# Patient Record
Sex: MALE | Employment: OTHER | URBAN - METROPOLITAN AREA
[De-identification: names, ages, dates, MRNs, and addresses within clinical notes are randomized per-mention and may not be internally consistent; named-entity substitution may affect disease eponyms.]

---

## 2017-01-11 ENCOUNTER — OFFICE VISIT (OUTPATIENT)
Dept: FAMILY MEDICINE | Facility: CLINIC | Age: 57
End: 2017-01-11
Payer: COMMERCIAL

## 2017-01-11 VITALS
RESPIRATION RATE: 24 BRPM | SYSTOLIC BLOOD PRESSURE: 138 MMHG | WEIGHT: 263 LBS | HEART RATE: 82 BPM | BODY MASS INDEX: 38.29 KG/M2 | OXYGEN SATURATION: 97 % | DIASTOLIC BLOOD PRESSURE: 82 MMHG | TEMPERATURE: 95.5 F

## 2017-01-11 DIAGNOSIS — J20.9 ACUTE BRONCHITIS, UNSPECIFIED ORGANISM: Primary | ICD-10-CM

## 2017-01-11 PROCEDURE — 99213 OFFICE O/P EST LOW 20 MIN: CPT | Performed by: FAMILY MEDICINE

## 2017-01-11 RX ORDER — AZITHROMYCIN 250 MG/1
TABLET, FILM COATED ORAL
Qty: 6 TABLET | Refills: 0 | Status: SHIPPED | OUTPATIENT
Start: 2017-01-11 | End: 2017-03-14

## 2017-01-11 NOTE — PROGRESS NOTES
SUBJECTIVE:                                                    Jeevan Lopez is a 56 year old male who presents to clinic today for the following health issues:      Acute Illness   Acute illness concerns: Cough, congestion  Onset: 5 days ago     Fever: YES- possibly    Chills/Sweats: no    Headache (location?): YES    Sinus Pressure:YES    Conjunctivitis:  no    Ear Pain: no    Rhinorrhea: YES    Congestion: YES    Sore Throat: YES- a little bit here and there     Cough: YES-non-productive    Wheeze: YES    Decreased Appetite: no    Nausea: no    Vomiting: no    Diarrhea:  no    Dysuria/Freq.: no    Fatigue/Achiness: YES    Sick/Strep Exposure: no     Therapies Tried and outcome: None          Problem list and histories reviewed & adjusted, as indicated.  Additional history: as documented    SUBJECTIVE:  Jeevan  is a 56 year old male who presents for:  Cough and symptoms as noted above. Has been going on for a week.    Past Medical History   Diagnosis Date     Bell's palsy 1/17/2007     LVH (left ventricular hypertrophy) 6/7/2011     see stress echo     Deviated septum 9/17/2013     See CT scan     Adenomatous polyp of colon 9/2013     q 5 yr colonoscopy     VENTRAL HERNIA NEC 6/19/2007     Sensory polyneuropathy 2014     feet - see neuro consult     Chronic prostatitis 2015     per urology     NI (obstructive sleep apnea) 2016     moderate - dental appliance     Past Surgical History   Procedure Laterality Date     Hc repair umbilical gabriel,7+y/o, incarcerated or strangulated  6/9/2004     Colonoscopy  9/30/2013     Procedure: COMBINED COLONOSCOPY, SINGLE BIOPSY/POLYPECTOMY BY BIOPSY;  colonoscopy, polypectomy by biopsy;  Surgeon: Pedro Chris MD;  Location: [facility]     Excise mass head  11/18/2013     Procedure: EXCISE MASS HEAD;  Excision of left forehead mass;  Surgeon: Pelon Echavarria MD;  Location: [facility]     Phacoemulsification with standard intraocular lens implant Left 11/5/2015     Procedure:  PHACOEMULSIFICATION WITH STANDARD INTRAOCULAR LENS IMPLANT;  Surgeon: Joss Raza MD;  Location: [facility]     Laser yag capsulotomy Left 6/2/2016     Procedure: LASER YAG CAPSULOTOMY;  Surgeon: Joss Raza MD;  Location: [facility]     Social History   Substance Use Topics     Smoking status: Never Smoker      Smokeless tobacco: Never Used     Alcohol Use: 0.0 oz/week     0 Standard drinks or equivalent per week      Comment: 24 beers per week     Current Outpatient Prescriptions   Medication Sig Dispense Refill     azithromycin (ZITHROMAX) 250 MG tablet Two tablets first day, then one tablet daily for four days. 6 tablet 0     amLODIPine (NORVASC) 5 MG tablet Take 1 tablet (5 mg) by mouth daily 90 tablet 3     fluticasone (FLONASE) 50 MCG/ACT nasal spray INAHLE 2 SPRAYS INTO BOTH NOSTRILS DAILY 16 g 9     omeprazole (PRILOSEC) 20 MG capsule TAKE ONE CAPSULE BY MOUTH EVERY DAY 90 capsule 2     Cyanocobalamin (B-12 PO)        aspirin (ASPIRIN LOW DOSE) 81 MG tablet Take 1 tablet by mouth daily.       Glucosamine-Chondroitin (GLUCOSAMINE CHONDR COMPLEX PO) Take  by mouth.         REVIEW OF SYSTEMS:   5 point ROS negative except as noted above in HPI, including Gen., Resp, CV, GI &  system review.     OBJECTIVE:  Vitals: /82 mmHg  Pulse 82  Temp(Src) 95.5  F (35.3  C) (Temporal)  Resp 24  Wt 263 lb (119.296 kg)  SpO2 97%  BMI= Body mass index is 38.29 kg/(m^2).  He appears in no distress. His throat is a little bit irritated. Ears are clear. Neck supple no adenopathy. Lungs with some wheezes bilaterally. Heart with a regular rhythm. Skin clear.    ASSESSMENT:  Bronchitis    PLAN:  Z-Christian as well for him in the past medical with this again. He will take over-the-counter cough medication. Follow up if not improving.        Byron Ross MD  [facility]

## 2017-01-11 NOTE — NURSING NOTE
"Chief Complaint   Patient presents with     Cough       Initial /82 mmHg  Pulse 82  Temp(Src) 95.5  F (35.3  C) (Temporal)  Resp 24  Wt 263 lb (119.296 kg)  SpO2 97% Estimated body mass index is 38.29 kg/(m^2) as calculated from the following:    Height as of 8/1/16: 5' 9.5\" (1.765 m).    Weight as of this encounter: 263 lb (119.296 kg).  BP completed using cuff size: large    "

## 2017-01-11 NOTE — MR AVS SNAPSHOT
"              After Visit Summary   2017    Jeevan Lopez    MRN: [id number]           Patient Information     Date Of Birth          1960        Visit Information        Provider Department      2017 8:00 AM Byron Ross MD [facility]        Today's Diagnoses     Acute bronchitis, unspecified organism    -  1        Follow-ups after your visit        Who to contact     If you have questions or need follow up information about today's clinic visit or your schedule please contact [facility] directly at [phone].  Normal or non-critical lab and imaging results will be communicated to you by [employer], letter or phone within 4 business days after the clinic has received the results. If you do not hear from us within 7 days, please contact the clinic through [employer] or phone. If you have a critical or abnormal lab result, we will notify you by phone as soon as possible.  Submit refill requests through [employer] or call your pharmacy and they will forward the refill request to us. Please allow 3 business days for your refill to be completed.          Additional Information About Your Visit        MyChart Information     [employer] lets you send messages to your doctor, view your test results, renew your prescriptions, schedule appointments and more. To sign up, go to [email or web address] . Click on \"Log in\" on the left side of the screen, which will take you to the Welcome page. Then click on \"Sign up Now\" on the right side of the page.     You will be asked to enter the access code listed below, as well as some personal information. Please follow the directions to create your username and password.     Your access code is: [email or web address]  Expires: 2017  8:26 AM     Your access code will  in 90 days. If you need help or a new code, please call your [facility] or [phone].        Care EveryWhere ID     This is your Care EveryWhere ID. This could be " used by other organizations to access your [place] medical records  [id number]        Your Vitals Were     Pulse Temperature Respirations Pulse Oximetry          82 95.5  F (35.3  C) (Temporal) 24 97%         Blood Pressure from Last 3 Encounters:   01/11/17 138/82   08/01/16 157/95   06/28/16 147/84    Weight from Last 3 Encounters:   01/11/17 263 lb (119.296 kg)   08/01/16 250 lb (113.399 kg)   06/28/16 250 lb (113.399 kg)              Today, you had the following     No orders found for display         Today's Medication Changes          These changes are accurate as of: 1/11/17  8:26 AM.  If you have any questions, ask your nurse or doctor.               Start taking these medicines.        Dose/Directions    azithromycin 250 MG tablet   Commonly known as:  ZITHROMAX   Used for:  Acute bronchitis, unspecified organism   Started by:  Byron Ross MD        Two tablets first day, then one tablet daily for four days.   Quantity:  6 tablet   Refills:  0            Where to get your medicines      These medications were sent to [facility] [address] [place] [facility] [address] [facility] [address]     Phone:  [phone]    - azithromycin 250 MG tablet             Primary Care Provider Office Phone # Fax #    Alicia Keating PA-C [phone]       [address]   [facility] [place] [address]        Thank you!     Thank you for choosing [facility]  for your care. Our goal is always to provide you with excellent care. Hearing back from our patients is one way we can continue to improve our services. Please take a few minutes to complete the written survey that you may receive in the mail after your visit with us. Thank you!             Your Updated Medication List - Protect others around you: Learn how to safely use, store and throw away your medicines at www.disposemymeds.org.          This list is accurate as of: 1/11/17  8:26  AM.  Always use your most recent med list.                   Brand Name Dispense Instructions for use    amLODIPine 5 MG tablet    NORVASC    90 tablet    Take 1 tablet (5 mg) by mouth daily       ASPIRIN LOW DOSE 81 MG tablet   Generic drug:  aspirin      Take 1 tablet by mouth daily.       azithromycin 250 MG tablet    ZITHROMAX    6 tablet    Two tablets first day, then one tablet daily for four days.       B-12 PO          fluticasone 50 MCG/ACT spray    FLONASE    16 g    INAHLE 2 SPRAYS INTO BOTH NOSTRILS DAILY       GLUCOSAMINE CHONDR COMPLEX PO      Take  by mouth.       omeprazole 20 MG CR capsule    priLOSEC    90 capsule    TAKE ONE CAPSULE BY MOUTH EVERY DAY

## 2017-02-13 ENCOUNTER — TELEPHONE (OUTPATIENT)
Dept: FAMILY MEDICINE | Facility: CLINIC | Age: 57
End: 2017-02-13

## 2017-02-13 DIAGNOSIS — K21.9 ESOPHAGEAL REFLUX: ICD-10-CM

## 2017-02-14 NOTE — TELEPHONE ENCOUNTER
Omeprazole       Last Written Prescription Date: 11/30/2015  Last Fill Quantity: 90,  # refills: 2   Last Office Visit with G, [facility] or [facility] prescribing provider: 1/11/2017

## 2017-02-16 NOTE — TELEPHONE ENCOUNTER
Rx refilled per RN protocol.  #90    Will forward to schedulers to schedule patient for OV.  Mayte Mas RN

## 2017-03-14 ENCOUNTER — OFFICE VISIT (OUTPATIENT)
Dept: SLEEP MEDICINE | Facility: CLINIC | Age: 57
End: 2017-03-14
Payer: COMMERCIAL

## 2017-03-14 VITALS — HEIGHT: 70 IN | WEIGHT: 255 LBS | BODY MASS INDEX: 36.51 KG/M2

## 2017-03-14 DIAGNOSIS — G47.33 OSA (OBSTRUCTIVE SLEEP APNEA): Primary | ICD-10-CM

## 2017-03-14 PROCEDURE — 99214 OFFICE O/P EST MOD 30 MIN: CPT | Performed by: OTOLARYNGOLOGY

## 2017-03-14 NOTE — NURSING NOTE
"Chief Complaint   Patient presents with     Sleep Problem     dental device is not working       Initial Ht 1.765 m (5' 9.5\")  Wt 115.7 kg (255 lb)  BMI 37.12 kg/m2 Estimated body mass index is 37.12 kg/(m^2) as calculated from the following:    Height as of this encounter: 1.765 m (5' 9.5\").    Weight as of this encounter: 115.7 kg (255 lb).  Medication Reconciliation: complete       "

## 2017-03-14 NOTE — PROGRESS NOTES
Sleep Study Follow-Up Visit:    Date on this visit: 3/14/2017    Jeevan Lopez comes in today for follow-up of his attempt to use dental appliance for NI.  The patient was found to have moderate NI with AHI 27.2 and hypoxemia 33min SAO2 <88%.  The patient tried dental appliance but had a lot of jaw discomfort even though clinically felt better in AM.  These findings were reviewed with patient.     Past medical/surgical history, family history, social history, medications and allergies were reviewed.      Problem List:  Patient Active Problem List    Diagnosis Date Noted     NI (obstructive sleep apnea)      Priority: Medium     moderate - dental appliance       Daytime somnolence 05/11/2016     Priority: Medium     Snoring 05/11/2016     Priority: Medium     Chronic prostatitis      Priority: Medium     per urology       Anxiety 07/21/2014     Priority: Medium     GERD (gastroesophageal reflux disease) 09/09/2013     Priority: Medium     Paresthesia of both feet 03/30/2015     Sensory polyneuropathy      feet - see neuro consult       Cold feet 05/29/2014     Numbness in feet 05/29/2014     Sleep disturbance 02/07/2014     ED (erectile dysfunction) 02/07/2014     Deviated septum 09/17/2013     See CT scan       Chronic rhinitis 09/09/2013     Adenomatous polyp of colon 09/01/2013     q 5 yr colonoscopy       Hyperlipidemia LDL goal <130 05/10/2011     Hypertension goal BP (blood pressure) < 140/90 12/20/2010     Abnormal glucose 01/13/2009     IMO update changed this record. Please review for accuracy       Obesity 06/19/2007     Problem list name updated by automated process. Provider to review      ORAL - Diaz 3, class 2 occlusion, good dentition, tonsils 1+  NOSE - mild deviation of the septum to the left moder enlarged inf turbinates    Impression/Plan:    The patient is interested in CPAP trial and will try AUTO CPAP at 5 to 15 cm range. We also discuss weight loss and healthy lifestyle changes at  length.  He will follow up with me in about 1 month(s).     Twenty-five minutes spent with patient, all of which were spent face-to-face counseling, consulting, coordinating plan of care.      Hector Dillon MD  Your BMI is Body mass index is 37.12 kg/(m^2).    What is BMI?  Body mass index (BMI) is one way to tell whether you are at a healthy weight, overweight, or obese. It measures your weight in relation to your height.  A BMI of 18.5 to 24.9 is in the healthy range. A person with a BMI of 25 to 29.9 is considered overweight, and someone with a BMI of 30 or greater is considered obese.  Another way to find out if you are at risk for health problems caused by overweight and obesity is to measure your waist. If you are a woman and your waist is more than 35 inches, or if you are a man and your waist is more than 40 inches, your risk of disease may be higher.  More than two-thirds of American adults are considered overweight or obese. Being overweight or obese increases the risk for further weight gain.  Excess weight may lead to heart disease and diabetes. Creating and following plans for healthy eating and physical activity may help you improve your health.    Methods for maintaining or losing weight.  Weight control is part of healthy lifestyle and includes exercise, emotional health, and healthy eating habits.  Careful eating habits lifelong is the mainstay of weight control.  Though there are significant health benefits from weight loss, long-term weight loss with diet alone may be very difficult to achieve- studies show long-term success with dietary management in less than 10% of people. Attaining a healthy weight may be especially difficult to achieve in those with severe obesity. In some cases, medications, devices and surgical management might be considered.    What can you do?  If you are overweight or obese and are interested in methods for weight loss, you should discuss this with your provider. In  addition, we recommend that you review healthy life styles and methods for weight loss available through the National Institutes of Health patient information sites:   http://win.niddk.nih.gov/publications/index.htm          CC: Alicia Keating

## 2017-03-14 NOTE — PATIENT INSTRUCTIONS

## 2017-03-14 NOTE — MR AVS SNAPSHOT
After Visit Summary   3/14/2017    Jeevan Lopez    MRN: [id number]           Patient Information     Date Of Birth          1960        Visit Information        Provider Department      3/14/2017 2:15 PM Hector Dillon MD [facility]        Today's Diagnoses     NI (obstructive sleep apnea)    -  1      Care Instructions      Your BMI is Body mass index is 37.12 kg/(m^2).  Weight management is a personal decision.  If you are interested in exploring weight loss strategies, the following discussion covers the approaches that may be successful. Body mass index (BMI) is one way to tell whether you are at a healthy weight, overweight, or obese. It measures your weight in relation to your height.  A BMI of 18.5 to 24.9 is in the healthy range. A person with a BMI of 25 to 29.9 is considered overweight, and someone with a BMI of 30 or greater is considered obese. More than two-thirds of American adults are considered overweight or obese.  Being overweight or obese increases the risk for further weight gain. Excess weight may lead to heart disease and diabetes.  Creating and following plans for healthy eating and physical activity may help you improve your health.  Weight control is part of healthy lifestyle and includes exercise, emotional health, and healthy eating habits. Careful eating habits lifelong are the mainstay of weight control. Though there are significant health benefits from weight loss, long-term weight loss with diet alone may be very difficult to achieve- studies show long-term success with dietary management in less than 10% of people. Attaining a healthy weight may be especially difficult to achieve in those with severe obesity. In some cases, medications, devices and surgical management might be considered.  What can you do?  If you are overweight or obese and are interested in methods for weight loss, you should discuss this with your provider.     Consider  reducing daily calorie intake by 500 calories.     Keep a food journal.     Avoiding skipping meals, consider cutting portions instead.    Diet combined with exercise helps maintain muscle while optimizing fat loss. Strength training is particularly important for building and maintaining muscle mass. Exercise helps reduce stress, increase energy, and improves fitness. Increasing exercise without diet control, however, may not burn enough calories to loose weight.       Start walking three days a week 10-20 minutes at a time    Work towards walking thirty minutes five days a week     Eventually, increase the speed of your walking for 1-2 minutes at time    In addition, we recommend that you review healthy lifestyles and methods for weight loss available through the National Institutes of Health patient information sites:  http://win.niddk.nih.gov/publications/index.htm    And look into health and wellness programs that may be available through your health insurance provider, employer, local community center, or [employer].    Weight management plan: Patient was referred to their PCP to discuss a diet and exercise plan.            Follow-ups after your visit        Who to contact     If you have questions or need follow up information about today's clinic visit or your schedule please contact [facility] directly at [phone].  Normal or non-critical lab and imaging results will be communicated to you by MyChart, letter or phone within 4 business days after the clinic has received the results. If you do not hear from us within 7 days, please contact the clinic through [employer] or phone. If you have a critical or abnormal lab result, we will notify you by phone as soon as possible.  Submit refill requests through [employer] or call your pharmacy and they will forward the refill request to us. Please allow 3 business days for your refill to be completed.          Additional Information About Your  "Visit        [employer] Information     [employer] lets you send messages to your doctor, view your test results, renew your prescriptions, schedule appointments and more. To sign up, go to [email or web address] . Click on \"Log in\" on the left side of the screen, which will take you to the Welcome page. Then click on \"Sign up Now\" on the right side of the page.     You will be asked to enter the access code listed below, as well as some personal information. Please follow the directions to create your username and password.     Your access code is: [email or web address]  Expires: 2017  9:26 AM     Your access code will  in 90 days. If you need help or a new code, please call your [place] clinic or [phone].        Care EveryWhere ID     This is your Care EveryWhere ID. This could be used by other organizations to access your [place] medical records  [id number]        Your Vitals Were     Height BMI (Body Mass Index)                1.765 m (5' 9.5\") 37.12 kg/m2           Blood Pressure from Last 3 Encounters:   17 138/82   16 (!) 157/95   16 147/84    Weight from Last 3 Encounters:   17 115.7 kg (255 lb)   17 119.3 kg (263 lb)   16 113.4 kg (250 lb)              We Performed the Following     Comprehensive DME          Today's Medication Changes          These changes are accurate as of: 3/14/17  2:56 PM.  If you have any questions, ask your nurse or doctor.               Stop taking these medicines if you haven't already. Please contact your care team if you have questions.     azithromycin 250 MG tablet   Commonly known as:  ZITHROMAX   Stopped by:  Hector Dillon MD                    Primary Care Provider Office Phone # Fax #    Alicia Keating PA-C [phone]       [place] [address] [facility] [address] [place] [address]        Thank you!     Thank you for choosing [facility]  for your care. Our goal is always to provide " you with excellent care. Hearing back from our patients is one way we can continue to improve our services. Please take a few minutes to complete the written survey that you may receive in the mail after your visit with us. Thank you!             Your Updated Medication List - Protect others around you: Learn how to safely use, store and throw away your medicines at www.disposemymeds.org.          This list is accurate as of: 3/14/17  2:56 PM.  Always use your most recent med list.                   Brand Name Dispense Instructions for use    amLODIPine 5 MG tablet    NORVASC    90 tablet    Take 1 tablet (5 mg) by mouth daily       ASPIRIN LOW DOSE 81 MG tablet   Generic drug:  aspirin      Take 1 tablet by mouth daily.       B-12 PO          fluticasone 50 MCG/ACT spray    FLONASE    16 g    INAHLE 2 SPRAYS INTO BOTH NOSTRILS DAILY       GLUCOSAMINE CHONDR COMPLEX PO      Take  by mouth.       omeprazole 20 MG CR capsule    priLOSEC    90 capsule    Take 1 capsule (20 mg) by mouth daily .  Appointment needed for additional refills.

## 2017-03-20 ENCOUNTER — DOCUMENTATION ONLY (OUTPATIENT)
Dept: SLEEP MEDICINE | Facility: CLINIC | Age: 57
End: 2017-03-20

## 2017-03-20 DIAGNOSIS — G47.33 OSA (OBSTRUCTIVE SLEEP APNEA): Primary | ICD-10-CM

## 2017-03-20 NOTE — PROGRESS NOTES
Patient was offered choice of vendor and chose [facility].  Patient Jeevan Lopez was set up at [place] on March 20, 2017. Patient received a Resmed AirSense 10 Auto. Pressures were set at 5-15 cm H2O.   Patient s ramp is 5 cm H2O for Off and FLEX/EPR is EPR.  Patient received a Resmed Mask name: Airfit F10  Full Face mask Size Large, heated tubing and heated humidifier.  Patient is enrolled in the STM Program and does need to meet compliance. Patient has a follow up on 04/25/17 with Petr Madera.    Jihan Dukes

## 2017-03-23 ENCOUNTER — DOCUMENTATION ONLY (OUTPATIENT)
Dept: SLEEP MEDICINE | Facility: CLINIC | Age: 57
End: 2017-03-23

## 2017-03-23 NOTE — PROGRESS NOTES
3 DAY STM VISIT    Patient contacted for 3 day STM visit  Message left for patient to return call    Current settings:  EPAP Min Auto CPAP: 5.0 (The minimum allowable pressure in cmH2O)       EPAP Max Auto CPAP: 15.0 (The maximum allowable pressure in cmH2O)       Assessment:  Patient has 2 days of use.   Action plan: Pt to have f/u 14 day STM visit.

## 2017-03-30 NOTE — PROGRESS NOTES
Patient returned call.    Subjective measures:  It seems to really help however he is feeling that he is not getting enough air when he first starts out.  He is also having an issue with mask seal.    Action plan:  Send  Order to provider to narrow pressure range to help with air hunger and mask fit.  Changed settings on device to soft response.

## 2017-04-04 ENCOUNTER — DOCUMENTATION ONLY (OUTPATIENT)
Dept: SLEEP MEDICINE | Facility: CLINIC | Age: 57
End: 2017-04-04

## 2017-04-04 DIAGNOSIS — G47.33 OSA (OBSTRUCTIVE SLEEP APNEA): Primary | ICD-10-CM

## 2017-04-04 NOTE — PROGRESS NOTES
14 DAY STM VISIT    Subjective measures:  Patient feels like he not getting enough pressure when he starts out. Patient would like to switch to a nasal mask and he's wakes up sweating from his current mask. Patient also not using his humidifier.     Assessment: Pt not meeting objective benchmarks for AHI Patient failing following subjective benchmarks: mask discomfort.   Action plan: Pt to have 30 day STM visit and order placed to provider for pressure change. Patient to call Jihan about mask exchange and will put in order for pressure change 7-12 cm H20.   Device settings:    EPAP Min Auto CPAP 5.0 (The minimum allowable pressure in cmH2O)    EPAP Max Auto CPAP 15.0 (The maximum allowable pressure in cmH2O)    Target EPAP (95% of Target) 14 day average (Resmed): 10.5cm H20      Objective measures: 14 day rolling measure     % compliance greater than four hours rolling average 14 days: 92.8 %     95% OF Leak in litres Rolling Average 14 Days: 39.23 lpm last data upload        AHI Rolling Average 14 Day: 6.29  last data upload        Time mask on face 14 day average: 401 min         Objective measure goal  Compliance   Goal >70%  Leak   Goal < 10%  AHI  Goal < 5  Usage  Goal >240

## 2017-04-04 NOTE — Clinical Note
[place] request pressure change 7-12 H20 to narrow pressures and AHI slightly elevated. Patient is 5-15 currently with a 14 day AHI of 6.2. Thanks

## 2017-04-06 ENCOUNTER — DOCUMENTATION ONLY (OUTPATIENT)
Dept: SLEEP MEDICINE | Facility: CLINIC | Age: 57
End: 2017-04-06

## 2017-04-06 NOTE — PROGRESS NOTES
Patient called and stated he switched to a nasal mask and is going to try it tonight. Patient wanted his EPR adjusted because he has difficulty exhaling against the pressure. EPR was adjusted from 2-3. Patient will call if he has any problems with his mask or machine.

## 2017-04-06 NOTE — PROGRESS NOTES
PATIENT CAME IN TO TRY DREAMWEAR BECAUSE HE FELT LIKE HE WAS ADJUSTING AND READJUSTING EVERY OTHER HOUR THROUGHOUT THE NIGHT WITH HIS FULL FACE. HE MENTIONED IT WAS MAKING HIM SWEAT AND WAS TOO TIGHT AT TIMES. HE DECIDED TO EXCHANGE HIS AIRFIT F10 LARGE FOR THE DREAMWEAR AND A CHIN STRAP. -AB

## 2017-04-19 NOTE — PROGRESS NOTES
30 DAY STM VISIT    Message left for patient to return call     Assessment: Pt meeting objective benchmarks.     Action plan: Waiting for patient to return call and Pt to have 6 month STM visit  Patient has a follow up visit with Petr Madera on 4/25/17.   Device settings:    EPAP Min Auto CPAP: 7.0 (The minimum allowable pressure in cmH2O)    EPAP Max Auto CPAP: 12.0 (The maximum allowable pressure in cmH2O)    Target IPAP (95% of Target) 14 day average (Resmed): 10.4cm H20    Objective measures: 14 day rolling measures      % compliance greater than four hours rolling average 14 days: 85.71%     95% OF Leak in litres Rolling Average 14 Days: 20.74 lpm  last  upload     AHI Rolling Average 14 Day: 2.69 last  upload     Time mask on face 14 day average: 392 min        Objective measure goal  Compliance   Goal >70%  Leak   Goal < 10%  AHI  Goal < 5  Usage  Goal >240

## 2017-04-20 ENCOUNTER — DOCUMENTATION ONLY (OUTPATIENT)
Dept: SLEEP MEDICINE | Facility: CLINIC | Age: 57
End: 2017-04-20

## 2017-06-12 DIAGNOSIS — J31.0 CHRONIC RHINITIS: ICD-10-CM

## 2017-06-12 DIAGNOSIS — I10 HYPERTENSION GOAL BP (BLOOD PRESSURE) < 140/90: ICD-10-CM

## 2017-06-13 NOTE — TELEPHONE ENCOUNTER
amLODIPine (NORVASC) 5 MG tablet      Last Written Prescription Date: 5/11/16  Last Fill Quantity: 90, # refills: 3    Last Office Visit with [facility], [facility] or [facility] prescribing provider:  1/11/17   Future Office Visit:        BP Readings from Last 3 Encounters:   01/11/17 138/82   08/01/16 (!) 157/95   06/28/16 147/84

## 2017-06-13 NOTE — TELEPHONE ENCOUNTER
fluticasone (FLONASE) 50 MCG/ACT nasal spray      Last Written Prescription Date: 5/4/16  Last Fill Quantity: 16,  # refills: 9   Last Office Visit with [facility], [facility] or [facility] prescribing provider: 1/11/17

## 2017-06-14 RX ORDER — FLUTICASONE PROPIONATE 50 MCG
SPRAY, SUSPENSION (ML) NASAL
Qty: 16 G | Refills: 9 | Status: SHIPPED | OUTPATIENT
Start: 2017-06-14 | End: 2017-07-19

## 2017-06-14 RX ORDER — AMLODIPINE BESYLATE 5 MG/1
5 TABLET ORAL DAILY
Qty: 30 TABLET | Refills: 0 | Status: SHIPPED | OUTPATIENT
Start: 2017-06-14 | End: 2017-07-19

## 2017-06-14 NOTE — TELEPHONE ENCOUNTER
Routing refill request to provider for review/approval because:  Labs out of range:  BP    T'd up 1 month    Will forward to schedulers to schedule patient for OV.  Mayte Mas RN

## 2017-06-21 ENCOUNTER — TELEPHONE (OUTPATIENT)
Dept: FAMILY MEDICINE | Facility: CLINIC | Age: 57
End: 2017-06-21

## 2017-06-21 DIAGNOSIS — I10 HYPERTENSION GOAL BP (BLOOD PRESSURE) < 140/90: ICD-10-CM

## 2017-06-21 RX ORDER — HYDROCHLOROTHIAZIDE 25 MG/1
TABLET ORAL
Qty: 90 TABLET | Refills: 0 | Status: SHIPPED | OUTPATIENT
Start: 2017-06-21 | End: 2017-07-19

## 2017-06-21 NOTE — TELEPHONE ENCOUNTER
Patient returned call and wanted Alicia Keating to know that he has an appointment scheduled with Dr. Ross on Wednesday 6/28 for a medication check so he could get in within 30 days.     Thank you  Shamar Villagomez

## 2017-06-21 NOTE — TELEPHONE ENCOUNTER
Patient just called and was going to set up an appointment for a yearly pe but is going to call his previous clinic to make sure its been a year. Patient stated he would call us back to schedule

## 2017-06-21 NOTE — TELEPHONE ENCOUNTER
HCTZ     Not on med list pt stopped med  Last Written Prescription Date: 5/11/2016  Last Fill Quantity: 90, # refills: 3  Last Office Visit with [facility] or [facility] prescribing provider: 1/11/2017       Potassium   Date Value Ref Range Status   05/11/2016 4.3 3.4 - 5.3 mmol/L Final     Creatinine   Date Value Ref Range Status   05/11/2016 0.82 0.66 - 1.25 mg/dL Final     BP Readings from Last 3 Encounters:   01/11/17 138/82   08/01/16 (!) 157/95   06/28/16 147/84

## 2017-06-22 ENCOUNTER — TRANSFERRED RECORDS (OUTPATIENT)
Dept: HEALTH INFORMATION MANAGEMENT | Facility: CLINIC | Age: 57
End: 2017-06-22

## 2017-06-22 DIAGNOSIS — Z12.5 SCREENING FOR PROSTATE CANCER: Primary | ICD-10-CM

## 2017-06-26 DIAGNOSIS — Z12.5 SCREENING FOR PROSTATE CANCER: ICD-10-CM

## 2017-06-26 LAB — PSA SERPL-MCNC: 0.87 UG/L (ref 0–4)

## 2017-06-26 PROCEDURE — 36415 COLL VENOUS BLD VENIPUNCTURE: CPT | Performed by: UROLOGY

## 2017-06-26 PROCEDURE — 84153 ASSAY OF PSA TOTAL: CPT | Performed by: UROLOGY

## 2017-07-19 ENCOUNTER — OFFICE VISIT (OUTPATIENT)
Dept: FAMILY MEDICINE | Facility: CLINIC | Age: 57
End: 2017-07-19
Payer: COMMERCIAL

## 2017-07-19 ENCOUNTER — TELEPHONE (OUTPATIENT)
Dept: FAMILY MEDICINE | Facility: CLINIC | Age: 57
End: 2017-07-19

## 2017-07-19 VITALS
WEIGHT: 261 LBS | OXYGEN SATURATION: 97 % | SYSTOLIC BLOOD PRESSURE: 144 MMHG | HEART RATE: 77 BPM | TEMPERATURE: 96.3 F | BODY MASS INDEX: 37.99 KG/M2 | DIASTOLIC BLOOD PRESSURE: 90 MMHG

## 2017-07-19 DIAGNOSIS — K21.9 GASTROESOPHAGEAL REFLUX DISEASE, ESOPHAGITIS PRESENCE NOT SPECIFIED: ICD-10-CM

## 2017-07-19 DIAGNOSIS — D12.6 ADENOMATOUS POLYP OF COLON, UNSPECIFIED PART OF COLON: ICD-10-CM

## 2017-07-19 DIAGNOSIS — E66.01 MORBID OBESITY DUE TO EXCESS CALORIES (H): ICD-10-CM

## 2017-07-19 DIAGNOSIS — G62.9 NEUROPATHY: Primary | ICD-10-CM

## 2017-07-19 DIAGNOSIS — I10 HYPERTENSION GOAL BP (BLOOD PRESSURE) < 140/90: Primary | ICD-10-CM

## 2017-07-19 DIAGNOSIS — J31.0 CHRONIC RHINITIS, UNSPECIFIED TYPE: ICD-10-CM

## 2017-07-19 DIAGNOSIS — E78.5 HYPERLIPIDEMIA LDL GOAL <130: ICD-10-CM

## 2017-07-19 DIAGNOSIS — G62.1 ALCOHOLIC PERIPHERAL NEUROPATHY (H): ICD-10-CM

## 2017-07-19 LAB
ALBUMIN SERPL-MCNC: 3.4 G/DL (ref 3.4–5)
ALP SERPL-CCNC: 74 U/L (ref 40–150)
ALT SERPL W P-5'-P-CCNC: 18 U/L (ref 0–70)
ANION GAP SERPL CALCULATED.3IONS-SCNC: 8 MMOL/L (ref 3–14)
AST SERPL W P-5'-P-CCNC: 19 U/L (ref 0–45)
BILIRUB SERPL-MCNC: 0.7 MG/DL (ref 0.2–1.3)
BUN SERPL-MCNC: 16 MG/DL (ref 7–30)
CALCIUM SERPL-MCNC: 9.2 MG/DL (ref 8.5–10.1)
CHLORIDE SERPL-SCNC: 103 MMOL/L (ref 94–109)
CHOLEST SERPL-MCNC: 190 MG/DL
CO2 SERPL-SCNC: 30 MMOL/L (ref 20–32)
CREAT SERPL-MCNC: 0.97 MG/DL (ref 0.66–1.25)
FOLATE SERPL-MCNC: 24.2 NG/ML
GFR SERPL CREATININE-BSD FRML MDRD: 81 ML/MIN/1.7M2
GLUCOSE SERPL-MCNC: 107 MG/DL (ref 70–99)
HDLC SERPL-MCNC: 48 MG/DL
LDLC SERPL CALC-MCNC: 121 MG/DL
NONHDLC SERPL-MCNC: 142 MG/DL
POTASSIUM SERPL-SCNC: 3.9 MMOL/L (ref 3.4–5.3)
PROT SERPL-MCNC: 7 G/DL (ref 6.8–8.8)
SODIUM SERPL-SCNC: 141 MMOL/L (ref 133–144)
TRIGL SERPL-MCNC: 103 MG/DL
TSH SERPL DL<=0.05 MIU/L-ACNC: 1.53 MU/L (ref 0.4–4)
VIT B12 SERPL-MCNC: 1036 PG/ML (ref 193–986)

## 2017-07-19 PROCEDURE — 84443 ASSAY THYROID STIM HORMONE: CPT | Performed by: FAMILY MEDICINE

## 2017-07-19 PROCEDURE — 82607 VITAMIN B-12: CPT | Performed by: FAMILY MEDICINE

## 2017-07-19 PROCEDURE — 99214 OFFICE O/P EST MOD 30 MIN: CPT | Performed by: FAMILY MEDICINE

## 2017-07-19 PROCEDURE — 36415 COLL VENOUS BLD VENIPUNCTURE: CPT | Performed by: FAMILY MEDICINE

## 2017-07-19 PROCEDURE — 80053 COMPREHEN METABOLIC PANEL: CPT | Performed by: FAMILY MEDICINE

## 2017-07-19 PROCEDURE — 82746 ASSAY OF FOLIC ACID SERUM: CPT | Performed by: FAMILY MEDICINE

## 2017-07-19 PROCEDURE — 80061 LIPID PANEL: CPT | Performed by: FAMILY MEDICINE

## 2017-07-19 RX ORDER — FLUTICASONE PROPIONATE 50 MCG
SPRAY, SUSPENSION (ML) NASAL
Qty: 16 G | Refills: 10 | Status: SHIPPED | OUTPATIENT
Start: 2017-07-19 | End: 2019-01-02

## 2017-07-19 RX ORDER — HYDROCHLOROTHIAZIDE 25 MG/1
25 TABLET ORAL DAILY
Qty: 90 TABLET | Refills: 1 | Status: SHIPPED | OUTPATIENT
Start: 2017-07-19 | End: 2018-04-10

## 2017-07-19 RX ORDER — AMLODIPINE BESYLATE 5 MG/1
5 TABLET ORAL DAILY
Qty: 90 TABLET | Refills: 1 | Status: ON HOLD | OUTPATIENT
Start: 2017-07-19 | End: 2017-08-04

## 2017-07-19 ASSESSMENT — PAIN SCALES - GENERAL: PAINLEVEL: MILD PAIN (3)

## 2017-07-19 NOTE — TELEPHONE ENCOUNTER
----- Message from Alistair Jasmine Mai, MD sent at 7/19/2017  5:55 PM CDT -----  Please let patient know that his folic acid level was normal. Thank you

## 2017-07-19 NOTE — TELEPHONE ENCOUNTER
----- Message from Alistair Jasmine Mai, MD sent at 7/19/2017  4:04 PM CDT -----  Please let patient know  that his labs show his vitamin B12 level is high. That is most likely due to the B12 supplement that he is taking. Thyroid level was also normal. Electrolytes, kidney function tests and liver function test were normal. No diabetes. Cholesterol looks good.

## 2017-07-19 NOTE — MR AVS SNAPSHOT
After Visit Summary   7/19/2017    Jeevan Lopez    MRN: [id number]           Patient Information     Date Of Birth          1960        Visit Information        Provider Department      7/19/2017 8:40 AM Alistair Martinez MD [facility]        Today's Diagnoses     Hypertension goal BP (blood pressure) < 140/90    -  1    Gastroesophageal reflux disease, esophagitis presence not specified        Adenomatous polyp of colon, unspecified part of colon        Hyperlipidemia LDL goal <130        Chronic rhinitis, unspecified type        Alcoholic peripheral neuropathy (H)        Alcoholism /alcohol abuse (H)           Follow-ups after your visit        Additional Services     GASTROENTEROLOGY ADULT REF PROCEDURE ONLY       Last Lab Result: Creatinine (mg/dL)       Date                     Value                 05/11/2016               0.82             ----------  Body mass index is 37.99 kg/(m^2).     Needed:  No  Language:  English    Patient will be contacted to schedule procedure.     Please be aware that coverage of these services is subject to the terms and limitations of your health insurance plan.  Call member services at your health plan with any benefit or coverage questions.  Any procedures must be performed at a [place] facility OR coordinated by your clinic's referral office.    Please bring the following with you to your appointment:    (1) Any X-Rays, CTs or MRIs which have been performed.  Contact the facility where they were done to arrange for  prior to your scheduled appointment.    (2) List of current medications   (3) This referral request   (4) Any documents/labs given to you for this referral                  Follow-up notes from your care team     Return in about 6 months (around 1/19/2018).      Your next 10 appointments already scheduled     Aug 04, 2017  9:20 AM CDT   Pre-Op physical with Alistair Jasmine Mai, MD   [facility] ([place]  "[facility])    [address] [facility] [id number] [phone]            Aug 07, 2017   Procedure with Kiel Solomon MD   [facility] ([facility])    [address] [place] [id number] [phone]              Who to contact     If you have questions or need follow up information about today's clinic visit or your schedule please contact [facility] directly at [phone].  Normal or non-critical lab and imaging results will be communicated to you by MyChart, letter or phone within 4 business days after the clinic has received the results. If you do not hear from us within 7 days, please contact the clinic through [employer] or phone. If you have a critical or abnormal lab result, we will notify you by phone as soon as possible.  Submit refill requests through [employer] or call your pharmacy and they will forward the refill request to us. Please allow 3 business days for your refill to be completed.          Additional Information About Your Visit        [employer] Information     [employer] lets you send messages to your doctor, view your test results, renew your prescriptions, schedule appointments and more. To sign up, go to [email or web address] . Click on \"Log in\" on the left side of the screen, which will take you to the Welcome page. Then click on \"Sign up Now\" on the right side of the page.     You will be asked to enter the access code listed below, as well as some personal information. Please follow the directions to create your username and password.     Your access code is: [email or web address]  Expires: 2017 11:15 AM     Your access code will  in 90 days. If you need help or a new code, please call your [place] clinic or [phone].        Care EveryWhere ID     This is your Care EveryWhere ID. This could be used by other organizations to access your [facility] medical records  [id number]        Your Vitals Were     " Pulse Temperature Pulse Oximetry BMI (Body Mass Index)          77 96.3  F (35.7  C) (Temporal) 97% 37.99 kg/m2         Blood Pressure from Last 3 Encounters:   07/19/17 144/90   01/11/17 138/82   08/01/16 (!) 157/95    Weight from Last 3 Encounters:   07/19/17 261 lb (118.4 kg)   03/14/17 255 lb (115.7 kg)   01/11/17 263 lb (119.3 kg)              We Performed the Following     Comprehensive metabolic panel     Folate     GASTROENTEROLOGY ADULT REF PROCEDURE ONLY     Lipid panel reflex to direct LDL     TSH     Vitamin B12          Today's Medication Changes          These changes are accurate as of: 7/19/17 11:59 PM.  If you have any questions, ask your nurse or doctor.               These medicines have changed or have updated prescriptions.        Dose/Directions    amLODIPine 5 MG tablet   Commonly known as:  NORVASC   This may have changed:  additional instructions   Used for:  Hypertension goal BP (blood pressure) < 140/90   Changed by:  Alistair Martinez MD        Dose:  5 mg   Take 1 tablet (5 mg) by mouth daily   Quantity:  90 tablet   Refills:  1       fluticasone 50 MCG/ACT spray   Commonly known as:  FLONASE   This may have changed:  See the new instructions.   Used for:  Chronic rhinitis, unspecified type   Changed by:  Alistair Martinez MD        USE 2 SPRAYS IN EACH NOSTRIL ONCE DAILY   Quantity:  16 g   Refills:  10       hydrochlorothiazide 25 MG tablet   Commonly known as:  HYDRODIURIL   This may have changed:  See the new instructions.   Used for:  Hypertension goal BP (blood pressure) < 140/90   Changed by:  Alistair Martinez MD        Dose:  25 mg   Take 1 tablet (25 mg) by mouth daily   Quantity:  90 tablet   Refills:  1       omeprazole 20 MG CR capsule   Commonly known as:  priLOSEC   This may have changed:  additional instructions   Used for:  Gastroesophageal reflux disease, esophagitis presence not specified   Changed by:  Alistair Martinez MD        Dose:  20 mg   Take 1 capsule (20 mg) by mouth daily    Quantity:  90 capsule   Refills:  3            Where to get your medicines      These medications were sent to [facility] [place], [place] - [address]   [address] , [facility] [address]     Phone:  [phone]     amLODIPine 5 MG tablet    fluticasone 50 MCG/ACT spray    hydrochlorothiazide 25 MG tablet    omeprazole 20 MG CR capsule                Primary Care Provider Office Phone # Fax #    Alicia LUCINDA Keating PA-C [phone]       [place] [id number] [facility] [address]        Equal Access to Services     [facility]: Hadii aad ku hadasho Soomaali, waaxda luqadaha, qaybta kaalmada adeegyada, waxay miin haydeangelon sharon ashley . So [facility] [phone].    ATENCIÓN: Si habla español, tiene a antoine disposición servicios gratuitos de asistencia lingüística. Llame al [phone].    We comply with applicable federal civil rights laws and [place] laws. We do not discriminate on the basis of race, color, national origin, age, disability sex, sexual orientation or gender identity.            Thank you!     Thank you for choosing [facility]  for your care. Our goal is always to provide you with excellent care. Hearing back from our patients is one way we can continue to improve our services. Please take a few minutes to complete the written survey that you may receive in the mail after your visit with us. Thank you!             Your Updated Medication List - Protect others around you: Learn how to safely use, store and throw away your medicines at www.disposemymeds.org.          This list is accurate as of: 7/19/17 11:59 PM.  Always use your most recent med list.                   Brand Name Dispense Instructions for use Diagnosis    amLODIPine 5 MG tablet    NORVASC    90 tablet    Take 1 tablet (5 mg) by mouth daily    Hypertension goal BP (blood pressure) < 140/90       ASPIRIN LOW DOSE 81 MG tablet   Generic drug:  aspirin       Take 1 tablet by mouth daily.        B-12 PO           fluticasone 50 MCG/ACT spray    FLONASE    16 g    USE 2 SPRAYS IN EACH NOSTRIL ONCE DAILY    Chronic rhinitis, unspecified type       GLUCOSAMINE CHONDR COMPLEX PO      Take  by mouth.        hydrochlorothiazide 25 MG tablet    HYDRODIURIL    90 tablet    Take 1 tablet (25 mg) by mouth daily    Hypertension goal BP (blood pressure) < 140/90       omeprazole 20 MG CR capsule    priLOSEC    90 capsule    Take 1 capsule (20 mg) by mouth daily    Gastroesophageal reflux disease, esophagitis presence not specified       order for DME      Equipment ordered: RESMED Auto PAP Mask type: Full face  Settings: 5-15 cm H2O

## 2017-07-19 NOTE — PROGRESS NOTES
SUBJECTIVE:                                                    Jeevan Lopez is a 56 year old male who presents to clinic today for the following health issues:    Medication Followup of all meds    Taking Medication as prescribed: yes    Side Effects:  None    Medication Helping Symptoms:  yes     Jeevan is here today for general follow-up with medication refills.  First is to follow-up on his high blood pressure. Stated that he has been on the medication for it for years. He takes hydrochlorothiazide and Norvasc with no side effect. Does not check blood pressure at home. No headache or dizziness. Chest pain or shortness of breath. No leg swelling, orthopnea or dyspnea. Does not exercise or followed the low-salt diet recommendation.      He also has heartburn for which he takes Omeprazole daily as needed and it has been working well. Although ee tries to stay away from greasy, spicy food, he does not follow its recommendation strictly. Continue to drink about 6 packs of beer a day. Been drinking for years. He has been having the numbness and tingling sensation on his feet for years which is about the same. He had this for years as well. Extensive workup in the past and they were negative including EMG. No histories of diabetes.  Was told that he has idiopathic neuropathy. Again he drinks about 6 packs of beer a day for many years.    He also was wondering about colon cancer screening.  His last colonoscopy was in 2013 and it showed adenomatous polyps and was recommended repeat colonoscopy in 3-5 years. Denies of melena or hematochezia. No abdominal pain. No diarrhea constipation.      Also has the running nose and nasal congestion chronically. Has had it for a while.  Been sneezing as well.  Known to have allergic rhinitis.  No sinus pain or pressure.    He has no other concern today. No headache or dizziness. No nausea, vomiting, diarrhea or constipation. No problem with urination.       Problem list and histories  reviewed & adjusted, as indicated.  Additional history: as documented    Current Outpatient Prescriptions   Medication Sig Dispense Refill     hydrochlorothiazide (HYDRODIURIL) 25 MG tablet Take 1 tablet (25 mg) by mouth daily 90 tablet 1     amLODIPine (NORVASC) 5 MG tablet Take 1 tablet (5 mg) by mouth daily 90 tablet 1     omeprazole (PRILOSEC) 20 MG CR capsule Take 1 capsule (20 mg) by mouth daily 90 capsule 3     fluticasone (FLONASE) 50 MCG/ACT spray USE 2 SPRAYS IN EACH NOSTRIL ONCE DAILY 16 g 10     order for DME Equipment ordered: RESMED Auto PAP Mask type: Full face  Settings: 5-15 cm H2O       Cyanocobalamin (B-12 PO)        aspirin (ASPIRIN LOW DOSE) 81 MG tablet Take 1 tablet by mouth daily.       Glucosamine-Chondroitin (GLUCOSAMINE CHONDR COMPLEX PO) Take  by mouth.       Allergies   Allergen Reactions     Lisinopril Other (See Comments)     Dry hacky cough       Reviewed and updated as needed this visit by clinical staffTobacco  Allergies  Soc Hx      Reviewed and updated as needed this visit by Provider         ROS:  Constitutional, HEENT, cardiovascular, pulmonary, gi and gu systems are negative, except as otherwise noted.      OBJECTIVE:   /90  Pulse 77  Temp 96.3  F (35.7  C) (Temporal)  Wt 261 lb (118.4 kg)  SpO2 97%  BMI 37.99 kg/m2  Body mass index is 37.99 kg/(m^2).  GENERAL: healthy, alert and no distress  HENT: ear canals and TM's normal.  Nares are congested with clear drainage. Oropharynx is pink and moist. No tender with palpation to the sinuses.  NECK: no adenopathy, supple and thyroid normal to palpation  RESP: lungs clear to auscultation - no rales, rhonchi or wheezes  CV: regular rate and rhythm, no murmur.  ABDOMEN: soft, nontender, no palpable masses and bowel sounds normal  MS: no gross musculoskeletal defects noted, no edema. No focal weakness  SKIN: no suspicious lesions or rashes  NEURO: Normal strength and tone, mentation intact and speech normal.  Cranial nerve  II- XII intact. DTRs +2 throughout. No focal neurological deficit.  PSYCH: mentation appears normal, affect normal/bright    Diagnostic Test Results:  Results for orders placed or performed in visit on 07/19/17   Comprehensive metabolic panel   Result Value Ref Range    Sodium 141 133 - 144 mmol/L    Potassium 3.9 3.4 - 5.3 mmol/L    Chloride 103 94 - 109 mmol/L    Carbon Dioxide 30 20 - 32 mmol/L    Anion Gap 8 3 - 14 mmol/L    Glucose 107 (H) 70 - 99 mg/dL    Urea Nitrogen 16 7 - 30 mg/dL    Creatinine 0.97 0.66 - 1.25 mg/dL    GFR Estimate 81 >60 mL/min/1.7m2    GFR Estimate If Black >90   GFR Calc   >60 mL/min/1.7m2    Calcium 9.2 8.5 - 10.1 mg/dL    Bilirubin Total 0.7 0.2 - 1.3 mg/dL    Albumin 3.4 3.4 - 5.0 g/dL    Protein Total 7.0 6.8 - 8.8 g/dL    Alkaline Phosphatase 74 40 - 150 U/L    ALT 18 0 - 70 U/L    AST 19 0 - 45 U/L   Lipid panel reflex to direct LDL   Result Value Ref Range    Cholesterol 190 <200 mg/dL    Triglycerides 103 <150 mg/dL    HDL Cholesterol 48 >39 mg/dL    LDL Cholesterol Calculated 121 (H) <100 mg/dL    Non HDL Cholesterol 142 (H) <130 mg/dL   TSH   Result Value Ref Range    TSH 1.53 0.40 - 4.00 mU/L   Vitamin B12   Result Value Ref Range    Vitamin B12 1036 (H) 193 - 986 pg/mL   Folate   Result Value Ref Range    Folate 24.2 >5.4 ng/mL       ASSESSMENT/PLAN:     1. Hypertension goal BP (blood pressure) < 140/90  BP is controlled and stable. Continue with the current medications - HCTZ and Norvasc.  He has been tolerating them well.  Encourage low salt and healthy diet with daily excercise. Lab ordered today CMP.  Follow up in 6 month, earlier as needed.    - Comprehensive metabolic panel  - hydrochlorothiazide (HYDRODIURIL) 25 MG tablet; Take 1 tablet (25 mg) by mouth daily  Dispense: 90 tablet; Refill: 1  - amLODIPine (NORVASC) 5 MG tablet; Take 1 tablet (5 mg) by mouth daily  Dispense: 90 tablet; Refill: 1    2. Gastroesophageal reflux disease, esophagitis  presence not specified  Chronic and controlled.  Discussed with patient about nature of condition.  Emphasize on avoiding late meals as well as fatty, greasy and spicy food.  Continue Omeprazole as needed.     - omeprazole (PRILOSEC) 20 MG CR capsule; Take 1 capsule (20 mg) by mouth daily  Dispense: 90 capsule; Refill: 3    3. Adenomatous polyp of colon, unspecified part of colon  Refer for colonoscopy    - GASTROENTEROLOGY ADULT REF PROCEDURE ONLY    4. Hyperlipidemia LDL goal <130  No on medication.  No exercise or practicing healthy diet.  Check lipid panel today. Further management to be consider base on the lab result  - Lipid panel reflex to direct LDL    5. Chronic rhinitis, unspecified type  Discussed with him about the nature of the condition.  Will start him on Flonase.  He was taught how to use the nasal spray.  Side effect also discussed.  OTC Zyrtec as needed as well.    - fluticasone (FLONASE) 50 MCG/ACT spray; USE 2 SPRAYS IN EACH NOSTRIL ONCE DAILY  Dispense: 16 g; Refill: 10    6. Alcoholic peripheral neuropathy (H)  Discussed with him about the nature condition. Reassured him that his neuropathy is most likely due to alcoholism. EMG results reviewed. He has this for years and there is no significant change. Loday include CMP, folic acid and B12 level.  Will continue to monitor.    7. Alcoholism /alcohol abuse (H)    Discussed with him about the nature of the condition and explained to him about its long-term and short-term consequences. Discussed about rehab as well as counseling.  He is not interested to quit at this time.   Recommended to take over-the-counter vitamin, folic acid, B12 and thiamine daily. Encouraged him to let us know once he considered stop drinking.    8.  Morbid obesity  Discussed with patient about the nature of the condition and its long term and short term effects.  Encourage him to start daily aerobic exercise and healthy diet.  Decrease or stop alcohol intake.  Discussed  about portioned diet as well.  Recommend him to set a goal of losing 2-4 # a month and work toward that with healthy life style modification.  Discussed with patient the goal for his weight and his BMI.      Alistair Jasmine Mai, MD  [facility]

## 2017-07-19 NOTE — TELEPHONE ENCOUNTER
Patient called back and I gave him both lab results. He understood both results and had no other questions at this time.       Thank you,  Aga Booth   for [facility]

## 2017-07-19 NOTE — TELEPHONE ENCOUNTER
Left message for patient to call back there is two different lab results listed to relay.  Deborah Winslow MA 7/19/2017

## 2017-07-19 NOTE — NURSING NOTE
"Chief Complaint   Patient presents with     RECHECK     meds       Initial /72  Pulse 77  Temp 96.3  F (35.7  C) (Temporal)  Wt 261 lb (118.4 kg)  SpO2 97%  BMI 37.99 kg/m2 Estimated body mass index is 37.99 kg/(m^2) as calculated from the following:    Height as of 3/14/17: 5' 9.5\" (1.765 m).    Weight as of this encounter: 261 lb (118.4 kg).  Medication Reconciliation: complete       There are no preventive care reminders to display for this patient.    Kelsey Alonso, CMA      "

## 2017-07-20 ENCOUNTER — TELEPHONE (OUTPATIENT)
Dept: FAMILY MEDICINE | Facility: CLINIC | Age: 57
End: 2017-07-20

## 2017-07-26 NOTE — TELEPHONE ENCOUNTER
Pt called back and I went over his results, he is wondering if Dr. Martinez would do an A1C do to his neuropathy in his feet. He states that there is a family history of diabetes and he has always been boarder line on his glucose. I did inform him that Alicia will not order that without seeing him since she hasn't seen him in over a year. And he doesn't really want to have to come back in to be seen just wants to do a lab appt. I informed him that Michelle Stock is out of the clinic until Monday and he will get a call to let him know either way on the lab. I also mailed out a copy of his labs.  Mayte Gaona MA

## 2017-07-26 NOTE — TELEPHONE ENCOUNTER
Patient still has some questions on the results and would like to talk to either Dr. Martinez or Alicia Keating's team about these.  He would also like a copy mailed to him.  Please call.  [phone]

## 2017-07-29 PROBLEM — G62.1 ALCOHOLIC PERIPHERAL NEUROPATHY (H): Status: ACTIVE | Noted: 2017-07-29

## 2017-07-29 PROBLEM — E66.01 MORBID OBESITY (H): Status: ACTIVE | Noted: 2017-07-29

## 2017-07-31 NOTE — TELEPHONE ENCOUNTER
Patient was informed that he can schedule a lab only appointment. The lab is ordered.  [place]ar [place], LUCY

## 2017-08-02 DIAGNOSIS — G62.9 NEUROPATHY: ICD-10-CM

## 2017-08-02 LAB — HBA1C MFR BLD: 5 % (ref 4.3–6)

## 2017-08-02 PROCEDURE — 36415 COLL VENOUS BLD VENIPUNCTURE: CPT | Performed by: FAMILY MEDICINE

## 2017-08-02 PROCEDURE — 83036 HEMOGLOBIN GLYCOSYLATED A1C: CPT | Mod: QW | Performed by: FAMILY MEDICINE

## 2017-08-04 ENCOUNTER — OFFICE VISIT (OUTPATIENT)
Dept: FAMILY MEDICINE | Facility: CLINIC | Age: 57
End: 2017-08-04
Payer: COMMERCIAL

## 2017-08-04 VITALS
DIASTOLIC BLOOD PRESSURE: 86 MMHG | SYSTOLIC BLOOD PRESSURE: 138 MMHG | BODY MASS INDEX: 39.04 KG/M2 | RESPIRATION RATE: 20 BRPM | HEART RATE: 78 BPM | TEMPERATURE: 98.2 F | WEIGHT: 268.2 LBS | OXYGEN SATURATION: 98 %

## 2017-08-04 DIAGNOSIS — Z01.818 PREOP GENERAL PHYSICAL EXAM: Primary | ICD-10-CM

## 2017-08-04 DIAGNOSIS — I10 HYPERTENSION GOAL BP (BLOOD PRESSURE) < 140/90: ICD-10-CM

## 2017-08-04 DIAGNOSIS — D12.6 ADENOMATOUS POLYP OF COLON, UNSPECIFIED PART OF COLON: ICD-10-CM

## 2017-08-04 PROCEDURE — 99213 OFFICE O/P EST LOW 20 MIN: CPT | Performed by: FAMILY MEDICINE

## 2017-08-04 ASSESSMENT — PAIN SCALES - GENERAL: PAINLEVEL: NO PAIN (0)

## 2017-08-04 NOTE — PROGRESS NOTES
[address] [address]  [phone]  Dept: [phone]    PRE-OP EVALUATION:  Today's date: 2017    Jeevan Lopez (: 1960) presents for pre-operative evaluation assessment as requested by Dr. Solomon.  He requires evaluation and anesthesia risk assessment prior to undergoing surgery/procedure for treatment of colonoscopy .  Proposed procedure: Colonoscopy    Date of Surgery/ Procedure: 2017  Time of Surgery/ Procedure: 9:00am  Hospital/Surgical Facility: [facility]    Primary Physician: Alicia Keating  Type of Anesthesia Anticipated: to be determined    Patient has a Health Care Directive or Living Will:  NO    1. NO - Do you have a history of heart attack, stroke, stent, bypass or surgery on an artery in the head, neck, heart or legs?  2. NO - Do you ever have any pain or discomfort in your chest?  3. NO - Do you have a history of  Heart Failure?  4. NO - Are you troubled by shortness of breath when: walking on the level, up a slight hill or at night?  5. NO - Do you currently have a cold, bronchitis or other respiratory infection?  6. NO - Do you have a cough, shortness of breath or wheezing?  7. NO - Do you sometimes get pains in the calves of your legs when you walk?  8. NO - Do you or anyone in your family have previous history of blood clots?  9. NO - Do you or does anyone in your family have a serious bleeding problem such as prolonged bleeding following surgeries or cuts?  10. NO - Have you ever had problems with anemia or been told to take iron pills?  11. NO - Have you had any abnormal blood loss such as black, tarry or bloody stools, or abnormal vaginal bleeding?  12. NO - Have you ever had a blood transfusion?  13. YES - HAVE YOU OR ANY OF YOUR RELATIVES EVER HAD PROBLEMS WITH ANESTHESIA? Mother might have  14. YES - DO YOU HAVE SLEEP APNEA, EXCESSIVE SNORING OR DAYTIME DROWSINESS? Sleep apnea  15. NO - Do you have any prosthetic  heart valves?  16. NO - Do you have prosthetic joints?  17. NO - Is there any chance that you may be pregnant?        HPI:                                                      Brief HPI related to upcoming procedure:     Jeevan is here today for pre-op physical for colonoscopy. It expected be the same day procedure with general anesthesia. There is no personal or family history of anesthesia complication. There is no family or personal history of pre-matured CAD or MI. Generally is healthy, BP has been normal with medication.  Has not been on steroid orally in the last 6 months.  He does not take Aspirin or other form of blood thinner.  Does not take NSAIDS.   Stated that he was well informed about the procedure and is ready to have the procedure done.    He generally is doing well and has no concern today. No headache or dizziness. No URI symptoms include running nose, nasal congestion, ST, coughing, fever or chill.  No chest pain or SOB.  No N/V/D/C and denies of having problem with urination.  He never smokes but denies of having breathing problem. He drinks alcohol regularly - about 2-3 beers a daily on the weekday, 6-7 beers daily on the weekend.      MEDICAL HISTORY:                                                    Patient Active Problem List    Diagnosis Date Noted     Morbid obesity (H) 07/29/2017     Priority: Medium     Alcoholic peripheral neuropathy (H) 07/29/2017     Priority: Medium     Alcoholism /alcohol abuse (H) 07/29/2017     Priority: Medium     NI (obstructive sleep apnea)      Priority: Medium     moderate - dental appliance       Chronic prostatitis      Priority: Medium     per urology       Anxiety 07/21/2014     Priority: Medium     Sleep disturbance 02/07/2014     Priority: Medium     ED (erectile dysfunction) 02/07/2014     Priority: Medium     Deviated septum 09/17/2013     Priority: Medium     See CT scan       Chronic rhinitis 09/09/2013     Priority: Medium     GERD (gastroesophageal  reflux disease) 09/09/2013     Priority: Medium     Adenomatous polyp of colon 09/01/2013     Priority: Medium     q 5 yr colonoscopy       Hyperlipidemia LDL goal <130 05/10/2011     Priority: Medium     Hypertension goal BP (blood pressure) < 140/90 12/20/2010     Priority: Medium     Obesity 06/19/2007     Priority: Medium     Problem list name updated by automated process. Provider to review        Past Medical History:   Diagnosis Date     Adenomatous polyp of colon 9/2013    q 5 yr colonoscopy     Bell's palsy 1/17/2007     Chronic prostatitis 2015    per urology     Deviated septum 9/17/2013    See CT scan     LVH (left ventricular hypertrophy) 6/7/2011    see stress echo     NI (obstructive sleep apnea) 2016    moderate - dental appliance     Sensory polyneuropathy 2014    feet - see neuro consult     VENTRAL HERNIA NEC 6/19/2007     Past Surgical History:   Procedure Laterality Date     COLONOSCOPY  9/30/2013    Procedure: COMBINED COLONOSCOPY, SINGLE BIOPSY/POLYPECTOMY BY BIOPSY;  colonoscopy, polypectomy by biopsy;  Surgeon: Pedro Chris MD;  Location: [facility]     EXCISE MASS HEAD  11/18/2013    Procedure: EXCISE MASS HEAD;  Excision of left forehead mass;  Surgeon: Pelon Echavarria MD;  Location: [facility]     HC REPAIR UMBILICAL YVONNE,5+Y/O, INCARCERATED OR STRANGULATED  6/9/2004     LASER YAG CAPSULOTOMY Left 6/2/2016    Procedure: LASER YAG CAPSULOTOMY;  Surgeon: Joss Raza MD;  Location: [facility]     PHACOEMULSIFICATION WITH STANDARD INTRAOCULAR LENS IMPLANT Left 11/5/2015    Procedure: PHACOEMULSIFICATION WITH STANDARD INTRAOCULAR LENS IMPLANT;  Surgeon: Joss Raza MD;  Location: [facility]     Current Outpatient Prescriptions   Medication Sig Dispense Refill     hydrochlorothiazide (HYDRODIURIL) 25 MG tablet Take 1 tablet (25 mg) by mouth daily 90 tablet 1     amLODIPine (NORVASC) 5 MG tablet Take 1 tablet (5 mg) by mouth daily 90 tablet 1     omeprazole (PRILOSEC) 20 MG CR capsule  Take 1 capsule (20 mg) by mouth daily 90 capsule 3     fluticasone (FLONASE) 50 MCG/ACT spray USE 2 SPRAYS IN EACH NOSTRIL ONCE DAILY 16 g 10     order for DME Equipment ordered: RESMED Auto PAP Mask type: Full face  Settings: 5-15 cm H2O       Cyanocobalamin (B-12 PO)        aspirin (ASPIRIN LOW DOSE) 81 MG tablet Take 1 tablet by mouth daily.       Glucosamine-Chondroitin (GLUCOSAMINE CHONDR COMPLEX PO) Take  by mouth.       OTC products: None, except as noted above    Allergies   Allergen Reactions     Lisinopril Other (See Comments)     Dry hacky cough      Latex Allergy: NO    Social History   Substance Use Topics     Smoking status: Never Smoker     Smokeless tobacco: Never Used     Alcohol use 0.0 oz/week     0 Standard drinks or equivalent per week      Comment: 24 beers per week     History   Drug Use No       REVIEW OF SYSTEMS:                                                    Constitutional, HEENT, cardiovascular, pulmonary, gi and gu systems are negative, except as otherwise noted.      EXAM:                                                    /86 (BP Location: Left arm, Patient Position: Chair, Cuff Size: Adult Regular)  Pulse 78  Temp 98.2  F (36.8  C) (Temporal)  Resp 20  Wt 268 lb 3.2 oz (121.7 kg)  SpO2 98%  BMI 39.04 kg/m2      GENERAL APPEARANCE: healthy, alert and no distress     EYES: EOMI,- PERRL     HENT: ear canals and TM's normal and nose and mouth without ulcers or lesions. Nares are non-congested. Oropharynx is pink and moist. No tender with palpation to the sinuses.     NECK: no adenopathy, no asymmetry and thyroid normal to palpation.  No tender with palpation to the cervical spine and its para-spinous muscle bilaterally.     RESP: lungs clear to auscultation - no rales, rhonchi or wheezes     CV: regular rates and rhythm and no murmur.     ABDOMEN:  soft, nontender, no palpable masses and bowel sounds normal     MS: extremities normal- no gross deformities noted.  No edema.   All 4 extremities are equally in strength.     NEURO: Normal strength and tone,  mentation intact and speech normal     PSYCH: mentation appears normal. and affect normal/bright     LYMPHATICS: No cervical adenopathy.    DIAGNOSTICS:                                                    No labs or EKG required for low risk surgery (cataract, skin procedure, breast biopsy, etc)    Recent Labs   Lab Test  08/02/17   0840  07/19/17   0953  05/11/16   1053  03/30/15   0935  09/17/14   1026   HGB   --    --   15.7  16.5   --    PLT   --    --   230  241   --    NA   --   141  137  138   --    POTASSIUM   --   3.9  4.3  4.3   --    CR   --   0.97  0.82  0.90   --    A1C  5.0   --    --    --   5.1      IMPRESSION:                                                    Reason for surgery/procedure: colon cancer screening with hx of colonic polyps  Diagnosis/reason for consult: .pre-op physical to evaluate for anesthesia and its christoph-operative risks.    The proposed surgical procedure is considered INTERMEDIATE risk.    REVISED CARDIAC RISK INDEX  The patient has the following serious cardiovascular risks for perioperative complications such as (MI, PE, VFib and 3  AV Block):  No serious cardiac risks  INTERPRETATION: 1 risks: Class II (low risk - 0.9% complication rate)    The patient has the following additional risks for perioperative complications:  Alcohol abuse with risk of withdrawal      ICD-10-CM    1. Preop general physical exam Z01.818        RECOMMENDATIONS:                                                      APPROVAL GIVEN to proceed with proposed procedure, without further diagnostic evaluation    Jeevan is overall doing well.  He is clear for the procedure as scheduled.  No further work up is needed.  Instructed him to fast at least 8 hrs before the procedure time. Not take any blood thinner.   I recommend to stay away from ASA and NSAIDs until after the surgery. I went over his medication list with instructions on  when and which medication should be held or taken. A written instruction was given as well. Recommend appropriate DVT prophylactic during and after the surgery per hospital's protocol.  All of his questions were answered.       Signed Electronically by: Alistair Jasmine Mai, MD    Copy of this evaluation report is provided to requesting physician.    [place] Preop Guidelines

## 2017-08-04 NOTE — MR AVS SNAPSHOT
After Visit Summary   8/4/2017    Jeevan Lopez    MRN: [id number]           Patient Information     Date Of Birth          1960        Visit Information        Provider Department      8/4/2017 9:20 AM Alistair Martinez MD [facility]        Today's Diagnoses     Preop general physical exam    -  1    Hypertension goal BP (blood pressure) < 140/90          Care Instructions      Before Your Surgery      Call your surgeon if there is any change in your health. This includes signs of a cold or flu (such as a sore throat, runny nose, cough, rash or fever).    Do not smoke, drink alcohol or take over the counter medicine (unless your surgeon or primary care doctor tells you to) for the 24 hours before and after surgery.    If you take prescribed drugs: Follow your doctor s orders about which medicines to take and which to stop until after surgery.    Eating and drinking prior to surgery: follow the instructions from your surgeon    Take a shower or bath the night before surgery. Use the soap your surgeon gave you to gently clean your skin. If you do not have soap from your surgeon, use your regular soap. Do not shave or scrub the surgery site.  Wear clean pajamas and have clean sheets on your bed.   Before Your Surgery    Call your surgeon if there is any change in your health. This includes signs of a cold or flu (such as a sore throat, runny nose, cough, rash or fever).  Do not smoke, drink alcohol or take over the counter medicine (unless your surgeon or primary care doctor tells you to) for the 24 hours before and after surgery.  If you take prescribed drugs: Follow your doctor s orders about which medicines to take and which to stop until after surgery.  Eating and drinking prior to surgery: follow the instructions from your surgeon  Take a shower or bath the night before surgery. Use the soap your surgeon gave you to gently clean your skin. If you do not have soap from your surgeon,  "use your regular soap. Do not shave or scrub the surgery site.  Wear clean pajamas and have clean sheets on your bed.           Follow-ups after your visit        Follow-up notes from your care team     Return if symptoms worsen or fail to improve.      Your next 10 appointments already scheduled     Aug 07, 2017   Procedure with Kiel Solomon MD   [facility] ([facility])    [address] [place] [id number] [phone]              Who to contact     If you have questions or need follow up information about today's clinic visit or your schedule please contact [facility] directly at [phone].  Normal or non-critical lab and imaging results will be communicated to you by MyChart, letter or phone within 4 business days after the clinic has received the results. If you do not hear from us within 7 days, please contact the clinic through [facility] or phone. If you have a critical or abnormal lab result, we will notify you by phone as soon as possible.  Submit refill requests through [employer] or call your pharmacy and they will forward the refill request to us. Please allow 3 business days for your refill to be completed.          Additional Information About Your Visit        MyChart Information     [employer] lets you send messages to your doctor, view your test results, renew your prescriptions, schedule appointments and more. To sign up, go to [email or web address] . Click on \"Log in\" on the left side of the screen, which will take you to the Welcome page. Then click on \"Sign up Now\" on the right side of the page.     You will be asked to enter the access code listed below, as well as some personal information. Please follow the directions to create your username and password.     Your access code is: [email or web address]  Expires: 2017 11:15 AM     Your access code will  in 90 days. If you need help or a new code, please call your [facility] " [facility] or [phone].        Care EveryWhere ID     This is your Care EveryWhere ID. This could be used by other organizations to access your [place] medical records  [id number]        Your Vitals Were     Pulse Temperature Respirations Pulse Oximetry BMI (Body Mass Index)       78 98.2  F (36.8  C) (Temporal) 20 98% 39.04 kg/m2        Blood Pressure from Last 3 Encounters:   08/04/17 138/86   07/19/17 144/90   01/11/17 138/82    Weight from Last 3 Encounters:   08/04/17 268 lb 3.2 oz (121.7 kg)   07/19/17 261 lb (118.4 kg)   03/14/17 255 lb (115.7 kg)              Today, you had the following     No orders found for display       Primary Care Provider Office Phone # Fax #    Alicia Keating PA-C [phone]       [facility] [address] [place] [address]        Equal Access to Services     TERE MACKAY : Hadii aad ku hadasho Soomaali, waaxda luqadaha, qaybta kaalmada adeegyada, waxay idiin hayaan adeeg kharash la'deangelon . So [facility] [phone].    ATENCIÓN: Si habla español, tiene a antoine disposición servicios gratuitos de asistencia lingüística. Llame al [phone].    We comply with applicable federal civil rights laws and [place] laws. We do not discriminate on the basis of race, color, national origin, age, disability sex, sexual orientation or gender identity.            Thank you!     Thank you for choosing [facility]  for your care. Our goal is always to provide you with excellent care. Hearing back from our patients is one way we can continue to improve our services. Please take a few minutes to complete the written survey that you may receive in the mail after your visit with us. Thank you!             Your Updated Medication List - Protect others around you: Learn how to safely use, store and throw away your medicines at www.disposemymeds.org.          This list is accurate as of: 8/4/17 10:27 AM.  Always use your most recent med list.                    Brand Name Dispense Instructions for use Diagnosis    amLODIPine 5 MG tablet    NORVASC    90 tablet    Take 1 tablet (5 mg) by mouth daily    Hypertension goal BP (blood pressure) < 140/90       ASPIRIN LOW DOSE 81 MG tablet   Generic drug:  aspirin      Take 1 tablet by mouth daily.        B-12 PO           fluticasone 50 MCG/ACT spray    FLONASE    16 g    USE 2 SPRAYS IN EACH NOSTRIL ONCE DAILY    Chronic rhinitis, unspecified type       GLUCOSAMINE CHONDR COMPLEX PO      Take  by mouth.        hydrochlorothiazide 25 MG tablet    HYDRODIURIL    90 tablet    Take 1 tablet (25 mg) by mouth daily    Hypertension goal BP (blood pressure) < 140/90       omeprazole 20 MG CR capsule    priLOSEC    90 capsule    Take 1 capsule (20 mg) by mouth daily    Gastroesophageal reflux disease, esophagitis presence not specified       order for DME      Equipment ordered: RESMED Auto PAP Mask type: Full face  Settings: 5-15 cm H2O

## 2017-08-04 NOTE — NURSING NOTE
"Chief Complaint   Patient presents with     Pre-Op Exam       Initial /86 (BP Location: Left arm, Patient Position: Chair, Cuff Size: Adult Regular)  Pulse 78  Temp 98.2  F (36.8  C) (Temporal)  Resp 20  Wt 268 lb 3.2 oz (121.7 kg)  SpO2 98%  BMI 39.04 kg/m2 Estimated body mass index is 39.04 kg/(m^2) as calculated from the following:    Height as of 3/14/17: 5' 9.5\" (1.765 m).    Weight as of this encounter: 268 lb 3.2 oz (121.7 kg).  Medication Reconciliation: complete   Jihan Fernández CMA     "

## 2017-08-07 ENCOUNTER — ANESTHESIA EVENT (OUTPATIENT)
Dept: GASTROENTEROLOGY | Facility: CLINIC | Age: 57
End: 2017-08-07
Payer: COMMERCIAL

## 2017-08-07 ENCOUNTER — SURGERY (OUTPATIENT)
Age: 57
End: 2017-08-07

## 2017-08-07 ENCOUNTER — HOSPITAL ENCOUNTER (OUTPATIENT)
Facility: CLINIC | Age: 57
Discharge: HOME | End: 2017-08-07
Attending: FAMILY MEDICINE | Admitting: FAMILY MEDICINE
Payer: COMMERCIAL

## 2017-08-07 ENCOUNTER — ANESTHESIA (OUTPATIENT)
Dept: GASTROENTEROLOGY | Facility: CLINIC | Age: 57
End: 2017-08-07
Payer: COMMERCIAL

## 2017-08-07 VITALS
OXYGEN SATURATION: 98 % | DIASTOLIC BLOOD PRESSURE: 79 MMHG | SYSTOLIC BLOOD PRESSURE: 131 MMHG | TEMPERATURE: 98.4 F | WEIGHT: 268.2 LBS | BODY MASS INDEX: 39.04 KG/M2 | RESPIRATION RATE: 12 BRPM

## 2017-08-07 LAB — COLONOSCOPY: NORMAL

## 2017-08-07 PROCEDURE — 25000128 H RX IP 250 OP 636: Performed by: NURSE ANESTHETIST, CERTIFIED REGISTERED

## 2017-08-07 PROCEDURE — G0105 COLORECTAL SCRN; HI RISK IND: HCPCS | Performed by: FAMILY MEDICINE

## 2017-08-07 PROCEDURE — 25000125 ZZHC RX 250: Performed by: NURSE ANESTHETIST, CERTIFIED REGISTERED

## 2017-08-07 PROCEDURE — 45378 DIAGNOSTIC COLONOSCOPY: CPT | Performed by: FAMILY MEDICINE

## 2017-08-07 PROCEDURE — 40000296 ZZH STATISTIC ENDO RECOVERY CLASS 1:2 FIRST HOUR: Performed by: FAMILY MEDICINE

## 2017-08-07 PROCEDURE — 40000297 ZZH STATISTIC ENDO RECOVERY CLASS 1:2 EACH ADDL HOUR: Performed by: FAMILY MEDICINE

## 2017-08-07 RX ORDER — LIDOCAINE 40 MG/G
CREAM TOPICAL
Status: DISCONTINUED | OUTPATIENT
Start: 2017-08-07 | End: 2017-08-07 | Stop reason: HOSPADM

## 2017-08-07 RX ORDER — ONDANSETRON 2 MG/ML
4 INJECTION INTRAMUSCULAR; INTRAVENOUS EVERY 30 MIN PRN
Status: CANCELLED | OUTPATIENT
Start: 2017-08-07

## 2017-08-07 RX ORDER — HYDRALAZINE HYDROCHLORIDE 20 MG/ML
2.5-5 INJECTION INTRAMUSCULAR; INTRAVENOUS EVERY 10 MIN PRN
Status: CANCELLED | OUTPATIENT
Start: 2017-08-07

## 2017-08-07 RX ORDER — NALOXONE HYDROCHLORIDE 0.4 MG/ML
.1-.4 INJECTION, SOLUTION INTRAMUSCULAR; INTRAVENOUS; SUBCUTANEOUS
Status: CANCELLED | OUTPATIENT
Start: 2017-08-07 | End: 2017-08-08

## 2017-08-07 RX ORDER — AMLODIPINE BESYLATE 5 MG/1
5 TABLET ORAL
Qty: 90 TABLET | Refills: 1 | COMMUNITY
Start: 2017-08-04 | End: 2017-12-11

## 2017-08-07 RX ORDER — PROPOFOL 10 MG/ML
INJECTION, EMULSION INTRAVENOUS CONTINUOUS PRN
Status: DISCONTINUED | OUTPATIENT
Start: 2017-08-07 | End: 2017-08-07

## 2017-08-07 RX ORDER — ONDANSETRON 2 MG/ML
4 INJECTION INTRAMUSCULAR; INTRAVENOUS
Status: DISCONTINUED | OUTPATIENT
Start: 2017-08-07 | End: 2017-08-07 | Stop reason: HOSPADM

## 2017-08-07 RX ORDER — LABETALOL HYDROCHLORIDE 5 MG/ML
10 INJECTION, SOLUTION INTRAVENOUS
Status: CANCELLED | OUTPATIENT
Start: 2017-08-07

## 2017-08-07 RX ORDER — FENTANYL CITRATE 50 UG/ML
25-50 INJECTION, SOLUTION INTRAMUSCULAR; INTRAVENOUS
Status: CANCELLED | OUTPATIENT
Start: 2017-08-07

## 2017-08-07 RX ORDER — PROPOFOL 10 MG/ML
INJECTION, EMULSION INTRAVENOUS PRN
Status: DISCONTINUED | OUTPATIENT
Start: 2017-08-07 | End: 2017-08-07

## 2017-08-07 RX ORDER — MEPERIDINE HYDROCHLORIDE 25 MG/ML
12.5 INJECTION INTRAMUSCULAR; INTRAVENOUS; SUBCUTANEOUS
Status: CANCELLED | OUTPATIENT
Start: 2017-08-07

## 2017-08-07 RX ORDER — SODIUM CHLORIDE, SODIUM LACTATE, POTASSIUM CHLORIDE, CALCIUM CHLORIDE 600; 310; 30; 20 MG/100ML; MG/100ML; MG/100ML; MG/100ML
INJECTION, SOLUTION INTRAVENOUS CONTINUOUS
Status: CANCELLED | OUTPATIENT
Start: 2017-08-07

## 2017-08-07 RX ORDER — ONDANSETRON 4 MG/1
4 TABLET, ORALLY DISINTEGRATING ORAL EVERY 30 MIN PRN
Status: CANCELLED | OUTPATIENT
Start: 2017-08-07

## 2017-08-07 RX ORDER — SODIUM CHLORIDE, SODIUM LACTATE, POTASSIUM CHLORIDE, CALCIUM CHLORIDE 600; 310; 30; 20 MG/100ML; MG/100ML; MG/100ML; MG/100ML
INJECTION, SOLUTION INTRAVENOUS CONTINUOUS
Status: DISCONTINUED | OUTPATIENT
Start: 2017-08-07 | End: 2017-08-07 | Stop reason: HOSPADM

## 2017-08-07 RX ADMIN — PROPOFOL 30 MG: 10 INJECTION, EMULSION INTRAVENOUS at 09:57

## 2017-08-07 RX ADMIN — PROPOFOL 150 MCG/KG/MIN: 10 INJECTION, EMULSION INTRAVENOUS at 09:52

## 2017-08-07 RX ADMIN — PROPOFOL 30 MG: 10 INJECTION, EMULSION INTRAVENOUS at 09:55

## 2017-08-07 RX ADMIN — SODIUM CHLORIDE, POTASSIUM CHLORIDE, SODIUM LACTATE AND CALCIUM CHLORIDE: 600; 310; 30; 20 INJECTION, SOLUTION INTRAVENOUS at 08:44

## 2017-08-07 RX ADMIN — LIDOCAINE HYDROCHLORIDE 1 ML: 10 INJECTION, SOLUTION EPIDURAL; INFILTRATION; INTRACAUDAL; PERINEURAL at 08:44

## 2017-08-07 RX ADMIN — PROPOFOL 50 MG: 10 INJECTION, EMULSION INTRAVENOUS at 09:53

## 2017-08-07 RX ADMIN — PROPOFOL 30 MG: 10 INJECTION, EMULSION INTRAVENOUS at 10:13

## 2017-08-07 RX ADMIN — PROPOFOL 30 MG: 10 INJECTION, EMULSION INTRAVENOUS at 10:00

## 2017-08-07 NOTE — IP AVS SNAPSHOT
[facility]    [address] [address]    Phone:  [phone]                                       After Visit Summary   8/7/2017    Jeevan Lopez    MRN: [id number]           After Visit Summary Signature Page     I have received my discharge instructions, and my questions have been answered. I have discussed any challenges I see with this plan with the nurse or doctor.    ..........................................................................................................................................  Patient/Patient Representative Signature      ..........................................................................................................................................  Patient Representative Print Name and Relationship to Patient    ..................................................               ................................................  Date                                            Time    ..........................................................................................................................................  Reviewed by Signature/Title    ...................................................              ..............................................  Date                                                            Time

## 2017-08-07 NOTE — DISCHARGE INSTRUCTIONS
[facility]    Home Care Following Endoscopy    Dr Solomon      Activity:    You have just undergone an endoscopic procedure usually performed with conscious sedation.  Do not work or operate machinery (including a car) for at least 12 hours.      I encourage you to walk and attempt to pass this air as soon as possible.    Diet:    Return to the diet you were on before your procedure but eat lightly for the first 12-24 hours.    Drink plenty of water.    Resume any regular medications unless otherwise advised by your physician.  Please begin any new medication prescribed as a result of your procedure as directed by your physician.     If you had any biopsy or polyp removed please refrain from aspirin or aspirin products for 2 days.  If on Coumadin please restart as instructed by your physician.   Pain:    You may take Tylenol as needed for pain.  Expected Recovery:    You can expect some mild abdominal fullness and/or discomfort due to the air used to inflate your intestinal tract. It is also normal to have a mild sore throat after upper endoscopy.    Call Your Physician if You Have:    After Colonoscopy:  o Worsening persisting abdominal pain which is worse with activity.  o Fevers (>101 degrees F), chills or shakes.  o Passage of continued blood with bowel movements.   Any questions or concerns about your recovery, please call [phone] or after hours [phone] Nurse Advice Line.    Follow-up Care:    You should receive a call or letter with your results within 1 week. Please call if you have not received a notification of your results.    If asked to return to clinic please make an appointment 1 week after your procedure.  Call [phone].

## 2017-08-07 NOTE — OR NURSING
Procedure delayed due to patient drinking black coffee up to 30 minutes per arrival.  Pedro EISENBERG CRNA involved with discussion with patient.

## 2017-08-07 NOTE — IP AVS SNAPSHOT
MRN:[id number]                      After Visit Summary   8/7/2017    Jeevan Lopez    MRN: [id number]           Thank you!     Thank you for choosing [facility] for your care. Our goal is always to provide you with excellent care. Hearing back from our patients is one way we can continue to improve our services. Please take a few minutes to complete the written survey that you may receive in the mail after you visit with us. Thank you!        Patient Information     Date Of Birth          1960        About your hospital stay     You were admitted on:  August 7, 2017 You last received care in the:  [facility]    You were discharged on:  August 7, 2017       Who to Call     For medical emergencies, please call 911.  For non-urgent questions about your medical care, please call your primary care provider or clinic, [phone]  For questions related to your surgery, please call your surgery clinic        Attending Provider     Provider Kiel Savage MD Family Practice       Primary Care Provider Office Phone # Fax #    Alicia Keating PA-C [phone]      Further instructions from your care team         [facility]    Home Care Following Endoscopy    Dr Solomon      Activity:    You have just undergone an endoscopic procedure usually performed with conscious sedation.  Do not work or operate machinery (including a car) for at least 12 hours.      I encourage you to walk and attempt to pass this air as soon as possible.    Diet:    Return to the diet you were on before your procedure but eat lightly for the first 12-24 hours.    Drink plenty of water.    Resume any regular medications unless otherwise advised by your physician.  Please begin any new medication prescribed as a result of your procedure as directed by your physician.     If you had any biopsy or polyp removed please refrain from aspirin or aspirin products for 2  "days.  If on Coumadin please restart as instructed by your physician.   Pain:    You may take Tylenol as needed for pain.  Expected Recovery:    You can expect some mild abdominal fullness and/or discomfort due to the air used to inflate your intestinal tract. It is also normal to have a mild sore throat after upper endoscopy.    Call Your Physician if You Have:    After Colonoscopy:  o Worsening persisting abdominal pain which is worse with activity.  o Fevers (>101 degrees F), chills or shakes.  o Passage of continued blood with bowel movements.   Any questions or concerns about your recovery, please call [phone] or after hours [phone] Nurse Advice Line.    Follow-up Care:    You should receive a call or letter with your results within 1 week. Please call if you have not received a notification of your results.    If asked to return to clinic please make an appointment 1 week after your procedure.  Call [phone].      Pending Results     No orders found from 8/5/2017 to 8/8/2017.            Admission Information     Date & Time Provider Department Dept. Phone    8/7/2017 Kiel Solomon MD [facility] Endoscopy [phone]      Your Vitals Were     Blood Pressure Temperature Respirations Weight Pulse Oximetry BMI (Body Mass Index)    131/79 98.4  F (36.9  C) (Oral) 12 121.7 kg (268 lb 3.2 oz) 98% 39.04 kg/m2      MyChart Information     [employer] lets you send messages to your doctor, view your test results, renew your prescriptions, schedule appointments and more. To sign up, go to [email or web address] . Click on \"Log in\" on the left side of the screen, which will take you to the Welcome page. Then click on \"Sign up Now\" on the right side of the page.     You will be asked to enter the access code listed below, as well as some personal information. Please follow the directions to create your username and password.     Your access code is: [email or web address]  Expires: 9/26/2017 11:15 AM   "   Your access code will  in 90 days. If you need help or a new code, please call your [facility] clinic or [phone].        Care EveryWhere ID     This is your Care EveryWhere ID. This could be used by other organizations to access your [facility] medical records  [id number]        Equal Access to Services     HIGINIOKWASI CODIE : Hadii aad ku hadasho Soomaali, waaxda luqadaha, qaybta kaalmada adeegyada, dayanara lal haydeangelon sharon geniahoney arenas. So [facility] [phone].    ATENCIÓN: Si habla español, tiene a antoine disposición servicios gratuitos de asistencia lingüística. Llame al [phone].    We comply with applicable federal civil rights laws and [place] laws. We do not discriminate on the basis of race, color, national origin, age, disability sex, sexual orientation or gender identity.               Review of your medicines      UNREVIEWED medicines. Ask your doctor about these medicines        Dose / Directions    amLODIPine 5 MG tablet   Commonly known as:  NORVASC   Used for:  Hypertension goal BP (blood pressure) < 140/90        Dose:  7.5 mg   Take 1.5 tablets (7.5 mg) by mouth daily   Quantity:  90 tablet   Refills:  1       ASPIRIN LOW DOSE 81 MG tablet   Generic drug:  aspirin        Dose:  1 tablet   Take 1 tablet by mouth daily.   Refills:  0       B-12 PO        Refills:  0       fluticasone 50 MCG/ACT spray   Commonly known as:  FLONASE   Used for:  Chronic rhinitis, unspecified type        USE 2 SPRAYS IN EACH NOSTRIL ONCE DAILY   Quantity:  16 g   Refills:  10       GLUCOSAMINE CHONDR COMPLEX PO        Take  by mouth.   Refills:  0       hydrochlorothiazide 25 MG tablet   Commonly known as:  HYDRODIURIL   Used for:  Hypertension goal BP (blood pressure) < 140/90        Dose:  25 mg   Take 1 tablet (25 mg) by mouth daily   Quantity:  90 tablet   Refills:  1       omeprazole 20 MG CR capsule   Commonly known as:  priLOSEC   Used for:  Gastroesophageal reflux disease, esophagitis presence not  specified        Dose:  20 mg   Take 1 capsule (20 mg) by mouth daily   Quantity:  90 capsule   Refills:  3         CONTINUE these medicines which have NOT CHANGED        Dose / Directions    order for DME        Equipment ordered: RESMED Auto PAP Mask type: Full face  Settings: 5-15 cm H2O   Refills:  0                Protect others around you: Learn how to safely use, store and throw away your medicines at www.disposemymeds.org.             Medication List: This is a list of all your medications and when to take them. Check marks below indicate your daily home schedule. Keep this list as a reference.      Medications           Morning Afternoon Evening Bedtime As Needed    amLODIPine 5 MG tablet   Commonly known as:  NORVASC   Take 1.5 tablets (7.5 mg) by mouth daily                                ASPIRIN LOW DOSE 81 MG tablet   Take 1 tablet by mouth daily.   Generic drug:  aspirin                                B-12 PO                                fluticasone 50 MCG/ACT spray   Commonly known as:  FLONASE   USE 2 SPRAYS IN EACH NOSTRIL ONCE DAILY                                GLUCOSAMINE CHONDR COMPLEX PO   Take  by mouth.                                hydrochlorothiazide 25 MG tablet   Commonly known as:  HYDRODIURIL   Take 1 tablet (25 mg) by mouth daily                                omeprazole 20 MG CR capsule   Commonly known as:  priLOSEC   Take 1 capsule (20 mg) by mouth daily                                order for DME   Equipment ordered: RESMED Auto PAP Mask type: Full face  Settings: 5-15 cm H2O

## 2017-08-07 NOTE — ANESTHESIA POSTPROCEDURE EVALUATION
Patient: Jeevan Lopze    Procedure(s):  colonoscopy - Wound Class: II-Clean Contaminated    Diagnosis:screening  Diagnosis Additional Information: No value filed.    Anesthesia Type:  MAC    Note:  Anesthesia Post Evaluation    Patient location during evaluation: Phase 2  Patient participation: Able to fully participate in evaluation  Level of consciousness: awake  Pain management: adequate  Cardiovascular status: acceptable and blood pressure returned to baseline  Respiratory status: acceptable and room air  Hydration status: acceptable  PONV: none     Anesthetic complications: None          Last vitals:  Vitals:    08/07/17 1025 08/07/17 1030 08/07/17 1045   BP: (!) 152/103 (!) 147/101 131/79   Resp: 16 12    Temp:      SpO2: 100% 98% 98%         Electronically Signed By: PRASAD Ordonez CRNA  August 7, 2017  11:52 AM

## 2017-09-14 ENCOUNTER — DOCUMENTATION ONLY (OUTPATIENT)
Dept: SLEEP MEDICINE | Facility: CLINIC | Age: 57
End: 2017-09-14

## 2017-09-14 NOTE — PROGRESS NOTES
"PATIENT CAME INTO THE [facility] TODAY BECAUSE HE WANTED TO TRY A DIFFERENT MASK, THE DREAMWEAR PILLOWS CUSHION. HE SAID HE WAS GETTING A \"FLUTTERING\" WITH THE CUSHION HE HAS NOW. I INFORMED HIM THAT HE WASN'T YET ELIGIBLE FOR A NEW MASK BUT THAT HE COULD GET A NEW CUSHION. HE WASN'T INTERESTED IN GETTING A NEW NASAL CUSHION BUT WANTED TO DEMO THE PILLOWS TO SEE IF HE'D PREFER THAT AND WANT TO ORDER IT WHEN HE'S ELIGIBLE ON 10/6. HE WILL LET ME KNOW IN A WEEK OR TWO.   "

## 2017-09-19 ENCOUNTER — DOCUMENTATION ONLY (OUTPATIENT)
Dept: SLEEP MEDICINE | Facility: CLINIC | Age: 57
End: 2017-09-19

## 2017-09-19 NOTE — PROGRESS NOTES
6 month [facility]    Data only recheck    Device settings:    EPAP Min Auto CPAP: 7.0 (The minimum allowable pressure in cmH2O)    EPAP Max Auto CPAP: 12.0 (The maximum allowable pressure in cmH2O)    Target EPAP (95% of Target) 14 day average (Resmed): 10.6cm H20    Objective measures: 14 day rolling measures      Compliance   (Goal >70%)  --% compliance greater than four hours rolling average 14 days: 92 %      Leak   (Goal < 24 lpm) --95% OF Leak in litres Rolling Average 14 Days: 16.88 lpm      AHI  (Goal < 5)  --AHI Rolling Average 14 Day: 2.31      Usage  (Goal >240)  --Time mask on face 14 day average: 501 min      Assessment:Pt meeting objective benchmarks.  Patient compliance is 84% and median usage is 8 hours and 9 minutes the last 180 days.   Action plan: Pt to follow up per provider request (1-2 yrs)

## 2017-10-12 ENCOUNTER — TELEPHONE (OUTPATIENT)
Dept: SLEEP MEDICINE | Facility: CLINIC | Age: 57
End: 2017-10-12

## 2017-10-12 DIAGNOSIS — Z53.9 ERRONEOUS ENCOUNTER--DISREGARD: Primary | ICD-10-CM

## 2017-10-12 NOTE — TELEPHONE ENCOUNTER
Patient has a neuropathy referral stated Dr Dillon has a name of a doctor will speak with him about who he would recommend.Estefany Nowak

## 2017-11-06 ENCOUNTER — TRANSFERRED RECORDS (OUTPATIENT)
Dept: HEALTH INFORMATION MANAGEMENT | Facility: CLINIC | Age: 57
End: 2017-11-06

## 2017-11-27 ENCOUNTER — TELEPHONE (OUTPATIENT)
Dept: FAMILY MEDICINE | Facility: CLINIC | Age: 57
End: 2017-11-27

## 2017-11-27 DIAGNOSIS — I10 HYPERTENSION GOAL BP (BLOOD PRESSURE) < 140/90: ICD-10-CM

## 2017-11-27 RX ORDER — AMLODIPINE BESYLATE 5 MG/1
5 TABLET ORAL
Qty: 180 TABLET | Refills: 3 | Status: CANCELLED | OUTPATIENT
Start: 2017-11-27

## 2017-11-27 NOTE — TELEPHONE ENCOUNTER
Jeevan Lopez is a 57 year old male who calls with chest pain that wakes him most mornings.    NURSING ASSESSMENT:  Description:  Patient is reporting he has been waking up in the early morning, like 4:00 am, at least 4-5 times per week for the past 6 weeks with chest pain and jaw pain.  He states he has been taking an ASA or a Norvasc and the pain will subside enough for him to go back to sleep.  He is reporting the pain as a crushing pain, but states he does not have the pain any other time during the day or night.  He states he did not have this pain this morning, and does not currently have any pain.  He is denying the following symptoms now: breathing issues, nausea, vomiting, chest pain or pressure, dizziness, weakness.  He states he is feeling better than he has for a long time due to the amount of exercise he has been doing, but the chest pain that wakes him in addition to the jaw pain that goes away wit ASA is concerning to him.  Patient states he appreciates the detail of work Dr. Martinez does and would like to follow up with him.  He states he has not had blood work for heart issues done and had not had a heart work up since 2011.  He is wondering if he can be seen to check if his heart is having issues such as blood work or an US to see if he has clogged arteries that is causing him this pain.  He is informed he will need to be seen soon, but that we can route this message to Dr. Martinez to see if he has suggestions as to what to do for next steps.  He reports his father had a double bi pass in the past.  Patient should be seen in 24 hours.   Onset/duration:  6 weeks  Precip. factors:  See above  Associated symptoms:  See above  Improves/worsens symptoms:  same  Pain scale (0-10)   Did not rate  Last exam/Treatment:  8/4/17  Allergies:   Allergies   Allergen Reactions     Lisinopril Other (See Comments)     Dry hacky cough       NURSING PLAN: Routed to provider Yes    RECOMMENDED DISPOSITION:  See in 24 hours  (PCP has C3PO scheduled tomorrow, 11/28/17?)  Will comply with recommendation: Yes  If further questions/concerns or if symptoms do not improve, worsen or new symptoms develop, call your PCP or [facility] as soon as possible.      Guideline used:  Chest Pain  Telephone Triage Protocols for Nurses, Fifth Edition, Odalys Mas RN

## 2017-11-27 NOTE — TELEPHONE ENCOUNTER
Patient states that he's had ongoing chest pain and had a big work up in 2011, which turned out to be normal. Chest pain has gotten worse over the last 1.5 months. Patient states he's been mountain biking 5 days a week about 5-10 miles at night, for the last 6 weeks. Patient states the pain wakes him up almost every morning and the pain can travel all over the chest area. Patientt has lost about 15 # and his shoulders and back feel great. Patient also states that he's only been taking 1 Norvasc in the morning and would like to increase to 5 mg in the AM and 5 mg in the PM? Please advise. Rx pending.  Chayito Stewart CMA

## 2017-11-27 NOTE — TELEPHONE ENCOUNTER
Please have RN to triage on chest pain. Informed patient that usually Norvasc is only once a day. Since he lost some weight, probably need to check his blood pressure before increasing the dose.

## 2017-11-28 ENCOUNTER — OFFICE VISIT (OUTPATIENT)
Dept: FAMILY MEDICINE | Facility: CLINIC | Age: 57
End: 2017-11-28
Payer: COMMERCIAL

## 2017-11-28 ENCOUNTER — HOSPITAL ENCOUNTER (OUTPATIENT)
Dept: GENERAL RADIOLOGY | Facility: CLINIC | Age: 57
Discharge: HOME | End: 2017-11-28
Attending: FAMILY MEDICINE | Admitting: FAMILY MEDICINE
Payer: COMMERCIAL

## 2017-11-28 VITALS — OXYGEN SATURATION: 98 % | TEMPERATURE: 97.4 F | WEIGHT: 252 LBS | HEART RATE: 76 BPM | BODY MASS INDEX: 36.68 KG/M2

## 2017-11-28 DIAGNOSIS — K21.9 GASTROESOPHAGEAL REFLUX DISEASE, ESOPHAGITIS PRESENCE NOT SPECIFIED: ICD-10-CM

## 2017-11-28 DIAGNOSIS — R07.89 ATYPICAL CHEST PAIN: Primary | ICD-10-CM

## 2017-11-28 DIAGNOSIS — E66.01 MORBID OBESITY (H): ICD-10-CM

## 2017-11-28 DIAGNOSIS — R07.89 ATYPICAL CHEST PAIN: ICD-10-CM

## 2017-11-28 DIAGNOSIS — I10 HYPERTENSION GOAL BP (BLOOD PRESSURE) < 140/90: ICD-10-CM

## 2017-11-28 LAB
ALBUMIN SERPL-MCNC: 3.4 G/DL (ref 3.4–5)
ALP SERPL-CCNC: 97 U/L (ref 40–150)
ALT SERPL W P-5'-P-CCNC: 17 U/L (ref 0–70)
ANION GAP SERPL CALCULATED.3IONS-SCNC: 6 MMOL/L (ref 3–14)
AST SERPL W P-5'-P-CCNC: 16 U/L (ref 0–45)
BASOPHILS # BLD AUTO: 0.1 10E9/L (ref 0–0.2)
BASOPHILS NFR BLD AUTO: 0.7 %
BILIRUB SERPL-MCNC: 0.8 MG/DL (ref 0.2–1.3)
BUN SERPL-MCNC: 16 MG/DL (ref 7–30)
CALCIUM SERPL-MCNC: 9.1 MG/DL (ref 8.5–10.1)
CHLORIDE SERPL-SCNC: 101 MMOL/L (ref 94–109)
CO2 SERPL-SCNC: 30 MMOL/L (ref 20–32)
CREAT SERPL-MCNC: 0.86 MG/DL (ref 0.66–1.25)
DIFFERENTIAL METHOD BLD: NORMAL
EOSINOPHIL # BLD AUTO: 0.1 10E9/L (ref 0–0.7)
EOSINOPHIL NFR BLD AUTO: 1.6 %
ERYTHROCYTE [DISTWIDTH] IN BLOOD BY AUTOMATED COUNT: 14 % (ref 10–15)
GFR SERPL CREATININE-BSD FRML MDRD: >90 ML/MIN/1.7M2
GLUCOSE SERPL-MCNC: 104 MG/DL (ref 70–99)
HCT VFR BLD AUTO: 47.8 % (ref 40–53)
HGB BLD-MCNC: 15.6 G/DL (ref 13.3–17.7)
IMM GRANULOCYTES # BLD: 0 10E9/L (ref 0–0.4)
IMM GRANULOCYTES NFR BLD: 0.2 %
LYMPHOCYTES # BLD AUTO: 1.9 10E9/L (ref 0.8–5.3)
LYMPHOCYTES NFR BLD AUTO: 21.1 %
MCH RBC QN AUTO: 29.8 PG (ref 26.5–33)
MCHC RBC AUTO-ENTMCNC: 32.6 G/DL (ref 31.5–36.5)
MCV RBC AUTO: 91 FL (ref 78–100)
MONOCYTES # BLD AUTO: 0.6 10E9/L (ref 0–1.3)
MONOCYTES NFR BLD AUTO: 6.3 %
NEUTROPHILS # BLD AUTO: 6.2 10E9/L (ref 1.6–8.3)
NEUTROPHILS NFR BLD AUTO: 70.1 %
PLATELET # BLD AUTO: 230 10E9/L (ref 150–450)
POTASSIUM SERPL-SCNC: 4.1 MMOL/L (ref 3.4–5.3)
PROT SERPL-MCNC: 7.5 G/DL (ref 6.8–8.8)
RBC # BLD AUTO: 5.24 10E12/L (ref 4.4–5.9)
SODIUM SERPL-SCNC: 137 MMOL/L (ref 133–144)
TROPONIN I SERPL-MCNC: <0.015 UG/L (ref 0–0.04)
WBC # BLD AUTO: 8.8 10E9/L (ref 4–11)

## 2017-11-28 PROCEDURE — 85025 COMPLETE CBC W/AUTO DIFF WBC: CPT | Performed by: FAMILY MEDICINE

## 2017-11-28 PROCEDURE — 99215 OFFICE O/P EST HI 40 MIN: CPT | Performed by: FAMILY MEDICINE

## 2017-11-28 PROCEDURE — 86141 C-REACTIVE PROTEIN HS: CPT | Performed by: FAMILY MEDICINE

## 2017-11-28 PROCEDURE — 36415 COLL VENOUS BLD VENIPUNCTURE: CPT | Performed by: FAMILY MEDICINE

## 2017-11-28 PROCEDURE — 71020 XR CHEST 2 VW: CPT | Mod: TC

## 2017-11-28 PROCEDURE — 93000 ELECTROCARDIOGRAM COMPLETE: CPT | Performed by: FAMILY MEDICINE

## 2017-11-28 PROCEDURE — 84484 ASSAY OF TROPONIN QUANT: CPT | Performed by: FAMILY MEDICINE

## 2017-11-28 PROCEDURE — 80053 COMPREHEN METABOLIC PANEL: CPT | Performed by: FAMILY MEDICINE

## 2017-11-28 RX ORDER — NITROGLYCERIN 0.4 MG/1
TABLET SUBLINGUAL
Qty: 25 TABLET | Refills: 0 | Status: SHIPPED | OUTPATIENT
Start: 2017-11-28 | End: 2021-04-20

## 2017-11-28 ASSESSMENT — PAIN SCALES - GENERAL: PAINLEVEL: NO PAIN (0)

## 2017-11-28 NOTE — NURSING NOTE
"Chief Complaint   Patient presents with     Chest Pain       Initial Pulse 76  Temp 97.4  F (36.3  C) (Temporal)  Wt 252 lb (114.3 kg)  SpO2 98%  BMI 36.68 kg/m2 Estimated body mass index is 36.68 kg/(m^2) as calculated from the following:    Height as of 3/14/17: 5' 9.5\" (1.765 m).    Weight as of this encounter: 252 lb (114.3 kg).  Medication Reconciliation: complete   Juana Baker CMA (AAMA)      "

## 2017-11-28 NOTE — MR AVS SNAPSHOT
After Visit Summary   11/28/2017    Jeevan Lopez    MRN: [id number]           Patient Information     Date Of Birth          1960        Visit Information        Provider Department      11/28/2017 10:10 AM Alistair Martinez MD [facility]        Today's Diagnoses     Atypical chest pain    -  1       Follow-ups after your visit        Your next 10 appointments already scheduled     Nov 29, 2017 11:00 AM CST   Ech Stress Test with PHECHR2   [facility]chocardiography ([facility])    [address] [place] [address]   [phone]           1. Please bring or wear a comfortable two-piece outfit and walking shoes. 2. Stop eating 3 hours before the test. You may drink water or juice. 3. Stop all caffeine 12 hours before the test. This includes coffee, tea, soda pop, chocolate and certain medicines (such as Anacin and Excederin). Also avoid decaf coffee and tea, as these contain small amounts of caffeine. 4. No alcohol, smoking or use of other tobacco products for 12 hours before the test. 5. Refer to your provider instructions to see if you need to stop any medications (such as beta-blockers or nitrates) for this test. 6. For patients with diabetes: - If you take insulin, call your diabetes care team. Ask if you should take a   dose the morning of your test. - If you take diabetes medicine by mouth, dont take it on the morning of your test. Bring it with you to take after the test. (If you have questions, call your diabetes care team) 7. When you arrive, please tell us if: - You have diabetes. - You have taken Viagra, Cialis or Levitra in the past 48 hours. 8. For any questions that cannot be answered, please contact the ordering physician              Future tests that were ordered for you today     Open Future Orders        Priority Expected Expires Ordered    XR Chest 2 Views Routine 11/28/2017 11/28/2018 11/28/2017    Exercise Stress Echocardiogram  "Routine  2018            Who to contact     If you have questions or need follow up information about today's clinic visit or your schedule please contact [facility] directly at [phone].  Normal or non-critical lab and imaging results will be communicated to you by MyChart, letter or phone within 4 business days after the clinic has received the results. If you do not hear from us within 7 days, please contact the clinic through MyChart or phone. If you have a critical or abnormal lab result, we will notify you by phone as soon as possible.  Submit refill requests through [employer] or call your pharmacy and they will forward the refill request to us. Please allow 3 business days for your refill to be completed.          Additional Information About Your Visit        [facility] Mobile Information     [employer] lets you send messages to your doctor, view your test results, renew your prescriptions, schedule appointments and more. To sign up, go to [email or web address] . Click on \"Log in\" on the left side of the screen, which will take you to the Welcome page. Then click on \"Sign up Now\" on the right side of the page.     You will be asked to enter the access code listed below, as well as some personal information. Please follow the directions to create your username and password.     Your access code is: [email or web address]  Expires: 2018 11:34 AM     Your access code will  in 90 days. If you need help or a new code, please call your [facility] clinic or [phone].        Care EveryWhere ID     This is your Care EveryWhere ID. This could be used by other organizations to access your [facility] medical records  [id number]        Your Vitals Were     Pulse Temperature Pulse Oximetry BMI (Body Mass Index)          76 97.4  F (36.3  C) (Temporal) 98% 36.68 kg/m2         Blood Pressure from Last 3 Encounters:   17 131/79   17 138/86   17 144/90    Weight from Last 3 " Encounters:   11/28/17 252 lb (114.3 kg)   08/07/17 268 lb 3.2 oz (121.7 kg)   08/04/17 268 lb 3.2 oz (121.7 kg)              We Performed the Following     CBC with platelets and differential     Comprehensive metabolic panel (BMP + Alb, Alk Phos, ALT, AST, Total. Bili, TP)     CRP cardiac risk     EKG 12-lead complete w/read - Clinics     Troponin I          Today's Medication Changes          These changes are accurate as of: 11/28/17 11:34 AM.  If you have any questions, ask your nurse or doctor.               Start taking these medicines.        Dose/Directions    nitroGLYcerin 0.4 MG sublingual tablet   Commonly known as:  NITROSTAT   Used for:  Atypical chest pain   Started by:  Alistair Martinez MD        For chest pain place 1 tablet under the tongue every 5 minutes for 3 doses. If symptoms persist 5 minutes after 1st dose call 911.   Quantity:  25 tablet   Refills:  0            Where to get your medicines      These medications were sent to [facility] [address]   [address] [place] [address] , [facility] [address]     Phone:  [phone]     nitroGLYcerin 0.4 MG sublingual tablet                Primary Care Provider Office Phone # Fax #    Alistair Jasmine Mai, [phone]       [address]   [facility] [address]        Equal Access to Services     [facility] AH: Hadii mayito ku hadasho Soomaali, waaxda luqadaha, qaybta kaalmada adeegyada, waxay idiin hayjoanna ashley . So [facility] [phone].    ATENCIÓN: Si habla español, tiene a antoine disposición servicios gratuitos de asistencia lingüística. Llame al [phone].    We comply with applicable federal civil rights laws and [place] laws. We do not discriminate on the basis of race, color, national origin, age, disability, sex, sexual orientation, or gender identity.            Thank you!     Thank you for choosing [facility]  for your care. Our goal is always to provide you with excellent care.  Hearing back from our patients is one way we can continue to improve our services. Please take a few minutes to complete the written survey that you may receive in the mail after your visit with us. Thank you!             Your Updated Medication List - Protect others around you: Learn how to safely use, store and throw away your medicines at www.disposemymeds.org.          This list is accurate as of: 11/28/17 11:34 AM.  Always use your most recent med list.                   Brand Name Dispense Instructions for use Diagnosis    amLODIPine 5 MG tablet    NORVASC    90 tablet    Take 5 mg by mouth daily    Hypertension goal BP (blood pressure) < 140/90       ASPIRIN LOW DOSE 81 MG tablet   Generic drug:  aspirin      Take 1 tablet by mouth daily.        B-12 PO           FISH OIL CONCENTRATE PO           fluticasone 50 MCG/ACT spray    FLONASE    16 g    USE 2 SPRAYS IN EACH NOSTRIL ONCE DAILY    Chronic rhinitis, unspecified type       GARLIC-X PO           GLUCOSAMINE CHONDR COMPLEX PO      Take  by mouth.        hydrochlorothiazide 25 MG tablet    HYDRODIURIL    90 tablet    Take 1 tablet (25 mg) by mouth daily    Hypertension goal BP (blood pressure) < 140/90       MAGNESIUM CITRATE PO           nitroGLYcerin 0.4 MG sublingual tablet    NITROSTAT    25 tablet    For chest pain place 1 tablet under the tongue every 5 minutes for 3 doses. If symptoms persist 5 minutes after 1st dose call 911.    Atypical chest pain       omeprazole 20 MG CR capsule    priLOSEC    90 capsule    Take 1 capsule (20 mg) by mouth daily    Gastroesophageal reflux disease, esophagitis presence not specified       order for DME      Equipment ordered: RESMED Auto PAP Mask type: Full face  Settings: 5-15 cm H2O        SAW PALMETTO CONCENTRATE OR           VITAMIN B-1 PO

## 2017-11-28 NOTE — PROGRESS NOTES
"  SUBJECTIVE:   Jeevan Lopez is a 57 year old male who presents to clinic today for the following health issues:    Jeevan is a 57-year-old gentleman with GERD and alcoholism presented today for a six weeks hx of chest pain and overall it is overall getting worse. This happened exclusively at night and he either wake up by it or with it. Pressure pain with tightness sensation crossed his chest between his nipples and radiate to his jaws bilaterally. The pain usually was about 3-4/10. Associated symptoms include diaphoresis with excessive sweating and hyperventilation. No hx of anxiety or panic attack.  No associated nausea or vomiting.  No radiation to the shoulders or arms. Went away if sit up and Aspirin.  Last night he was woken up 4 times so the took 4 doses of baby aspirin.  Stated that the symptoms would get worse if he does not get up. Symptoms not triggered or get worse with activities and \"never happened during the day\". Stated that he's been very active in the last 2 months; he has been mountain biking at night for 45-60 minutes every night and has been loosing quite a bit of weight. She feels very good in regard to this. Again, the pain is not triggered or get worse with activities/exertions. He is known to have heartburn but he feels this pain is very different. \"I am very sensitive to reflux so I know it when it comes\".  He feels his reflux is controlled and in fact he rarely takes the Prilosec anymore.  He drinks alcohol about half a pack a day for many years. Stated that he has to sit up in order for the pain to go away. Last night, he woken up by the pain 4 times and he  aspirin for it. Stated that if he doesn't sit up, the pain would get worse. No leg swelling, dyspnea or orthopnea. No diarrhea or constipation. No URI symptoms including runny nose, nasal congestion or coughing. No personal history of heart disease. His father had double bypass in his early 70s . He has high blood pressure and been " "taking the Norvasc. His blood pressure has been on the higher side at home. Had similar chest pain in 2011 when he had extensive workup including echocardiogram and nuclear stress test and they were normal. Denies of drug use. Stated that he has been getting tired and exhausted more easily and gradually. No other concern.        \"NURSING ASSESSMENT:  Description:  Patient is reporting he has been waking up in the early morning, like 4:00 am, at least 4-5 times per week for the past 6 weeks with chest pain and jaw pain.  He states he has been taking an ASA or a Norvasc and the pain will subside enough for him to go back to sleep.  He is reporting the pain as a crushing pain, but states he does not have the pain any other time during the day or night.  He states he did not have this pain this morning, and does not currently have any pain.  He is denying the following symptoms now: breathing issues, nausea, vomiting, chest pain or pressure, dizziness, weakness.  He states he is feeling better than he has for a long time due to the amount of exercise he has been doing, but the chest pain that wakes him in addition to the jaw pain that goes away wit ASA is concerning to him.  Patient states he appreciates the detail of work Dr. Martinez does and would like to follow up with him.  He states he has not had blood work for heart issues done and had not had a heart work up since 2011.  He is wondering if he can be seen to check if his heart is having issues such as blood work or an US to see if he has clogged arteries that is causing him this pain.  He is informed he will need to be seen soon, but that we can route this message to Dr. Martinez to see if he has suggestions as to what to do for next steps.  He reports his father had a double bi pass in the past.  Patient should be seen in 24 hours.   Onset/duration:  6 weeks  Precip. factors:  See above  Associated symptoms:  See above  Improves/worsens symptoms:  same  Pain scale (0-10) " "  Did not rate  Last exam/Treatment:  8/4/17\"          Problem list and histories reviewed & adjusted, as indicated.  Additional history: as documented    Current Outpatient Prescriptions   Medication Sig Dispense Refill     MAGNESIUM CITRATE PO        Omega-3 Fatty Acids (FISH OIL CONCENTRATE PO)        GARLIC-X PO        Saw Palmetto, Serenoa repens, (SAW PALMETTO CONCENTRATE OR)        Thiamine HCl (VITAMIN B-1 PO)        amLODIPine (NORVASC) 5 MG tablet Take 5 mg by mouth daily  90 tablet 1     hydrochlorothiazide (HYDRODIURIL) 25 MG tablet Take 1 tablet (25 mg) by mouth daily 90 tablet 1     omeprazole (PRILOSEC) 20 MG CR capsule Take 1 capsule (20 mg) by mouth daily 90 capsule 3     fluticasone (FLONASE) 50 MCG/ACT spray USE 2 SPRAYS IN EACH NOSTRIL ONCE DAILY 16 g 10     order for DME Equipment ordered: RESMED Auto PAP Mask type: Full face  Settings: 5-15 cm H2O       Cyanocobalamin (B-12 PO)        aspirin (ASPIRIN LOW DOSE) 81 MG tablet Take 1 tablet by mouth daily.       Glucosamine-Chondroitin (GLUCOSAMINE CHONDR COMPLEX PO) Take  by mouth.       Allergies   Allergen Reactions     Lisinopril Other (See Comments)     Dry hacky cough       Reviewed and updated as needed this visit by clinical staff  Allergies       Reviewed and updated as needed this visit by Provider         ROS:  Constitutional, HEENT, cardiovascular, pulmonary, GI, , musculoskeletal, neuro, skin, endocrine and psych systems are negative, except as otherwise noted.      OBJECTIVE:   Pulse 76  Temp 97.4  F (36.3  C) (Temporal)  Wt 252 lb (114.3 kg)  SpO2 98%  BMI 36.68 kg/m2  Body mass index is 36.68 kg/(m^2).   GENERAL: healthy, alert and no distress.  Speaking in full sentences.   EYES: Eyes grossly normal to inspection, PERRL and conjunctivae and sclerae normal  HENT: ear canals and TM's normal. Nares are non-congested. Oropharynx is pink and moist. No tender with palpation to the sinuses.   NECK: no adenopathy and thyroid normal " to palpation.  No tender with palpation to the cervical spine or its paraspinous muscle. Neck is supple.  RESP: lungs clear to auscultation - no rales, rhonchi or wheezes  CV: regular rate and rhythm, normal S1 S2, no murmur.  Peripheral pulses are strong and equal  ABDOMEN: soft, obese or distended and bowel sounds normal.  No CVA tenderness.  MS: no gross musculoskeletal defects noted, no edema.  All four extremities are equally in strength. Shoulder exam was normal.  NEURO: Normal strength and tone, mentation intact and speech normal.  Cranial nerves II through XII are intact. DTR +2 throughout. No focal neurological deficit.   PSYCH: mentation appears normal, affect normal/bright.    Diagnostic Test Results:  Results for orders placed or performed in visit on 11/28/17 (from the past 24 hour(s))   Troponin I   Result Value Ref Range    Troponin I ES <0.015 0.000 - 0.045 ug/L   CBC with platelets and differential   Result Value Ref Range    WBC 8.8 4.0 - 11.0 10e9/L    RBC Count 5.24 4.4 - 5.9 10e12/L    Hemoglobin 15.6 13.3 - 17.7 g/dL    Hematocrit 47.8 40.0 - 53.0 %    MCV 91 78 - 100 fl    MCH 29.8 26.5 - 33.0 pg    MCHC 32.6 31.5 - 36.5 g/dL    RDW 14.0 10.0 - 15.0 %    Platelet Count 230 150 - 450 10e9/L    Diff Method Automated Method     % Neutrophils 70.1 %    % Lymphocytes 21.1 %    % Monocytes 6.3 %    % Eosinophils 1.6 %    % Basophils 0.7 %    % Immature Granulocytes 0.2 %    Absolute Neutrophil 6.2 1.6 - 8.3 10e9/L    Absolute Lymphocytes 1.9 0.8 - 5.3 10e9/L    Absolute Monocytes 0.6 0.0 - 1.3 10e9/L    Absolute Eosinophils 0.1 0.0 - 0.7 10e9/L    Absolute Basophils 0.1 0.0 - 0.2 10e9/L    Abs Immature Granulocytes 0.0 0 - 0.4 10e9/L   Comprehensive metabolic panel (BMP + Alb, Alk Phos, ALT, AST, Total. Bili, TP)   Result Value Ref Range    Sodium 137 133 - 144 mmol/L    Potassium 4.1 3.4 - 5.3 mmol/L    Chloride 101 94 - 109 mmol/L    Carbon Dioxide 30 20 - 32 mmol/L    Anion Gap 6 3 - 14 mmol/L     Glucose 104 (H) 70 - 99 mg/dL    Urea Nitrogen 16 7 - 30 mg/dL    Creatinine 0.86 0.66 - 1.25 mg/dL    GFR Estimate >90 >60 mL/min/1.7m2    GFR Estimate If Black >90 >60 mL/min/1.7m2    Calcium 9.1 8.5 - 10.1 mg/dL    Bilirubin Total 0.8 0.2 - 1.3 mg/dL    Albumin 3.4 3.4 - 5.0 g/dL    Protein Total 7.5 6.8 - 8.8 g/dL    Alkaline Phosphatase 97 40 - 150 U/L    ALT 17 0 - 70 U/L    AST 16 0 - 45 U/L       ASSESSMENT/PLAN:     1. Atypical chest pain    Jeevan is here today for 6 weeks history of nocturnal chest pain with diaphoresis, excessive sweating and hyperventilation. He is known to have high blood pressure as well as GERD. He also drinks at least half a pack of beer a day for many years. Had similar pain in 2011 and cardiac workup at that time was negative which included echo stress echocardiogram and nuclear stress test. EKG showed normal sinus rhythm with no ST depressions or elevations. No Q-wave or abnormal T-wave. Physical exam was normal except of mild tenderness with palpation to the epigastric area without guarding or rebounding.  His clinical presentation to me is suggestive of acid reflux. Patient however adamantly does not believe that it is his acid reflux.  Regardless, I think it is necessary to rule out cardiac cause immediately. I recommended him to go to the emergency room for further evaluation but he declined. Labs as ordered which I will reviewed. Troponin, CBC and CMP were normal. Chest x-rays showed borderline cardiomegaly otherwise normal. Also sent for an exercise stress echocardiogram echocardiogram to be done as soon as possible, preferably within the next 24-48 hours. In the meantime, encouraged him to stay away from spicy/greasy food. Also recommend him to start taking the Prilosec twice a day for now. Emphasized importance to stop drinking alcohol. Also avoid NSAID products and recommended no more than 2 tablets of aspirin today. Will also start him on nitroglycerin to be taken as  needed for chest pain. If all cardiac workup is negative, the next step would be referral for upper endoscopy to evaluate for alcoholic gastritis. Also encouraged him to go to the ER if the symptom persists, worsens or if he has any concerns or question.     - EKG 12-lead complete w/read - Clinics  - Troponin I  - CBC with platelets and differential  - Comprehensive metabolic panel (BMP + Alb, Alk Phos, ALT, AST, Total. Bili, TP)  - CRP cardiac risk  - XR Chest 2 Views; Future  - Exercise Stress Echocardiogram; Future  - nitroGLYcerin (NITROSTAT) 0.4 MG sublingual tablet; For chest pain place 1 tablet under the tongue every 5 minutes for 3 doses. If symptoms persist 5 minutes after 1st dose call 911.  Dispense: 25 tablet; Refill: 0    2. Gastroesophageal reflux disease, esophagitis presence not specified  Most likely due to alcoholism.  Denies of taking excessive NSAID products. Has had this for awhile and he feels it is well controlled. He been taking the Prilosec only as needed and rarely. His chest pain could have been due to uncontrolled GERD/gastritis. Discussed with him about the nature of the condition. Recommend to stay away from greasy and spicy food. Also avoid the late meals as well as high caffeinated/sugar beverages. Encouraged him to restart the Prilosec twice a day for now. Recommend to stop drinking alcohol. Consider upper endoscopy if workup for cardiac cause for the chest pain is negative.    3. Hypertension goal BP (blood pressure) < 140/90    Blood pressure is normal today. Continue with the Norvasc. Continue with exercise and weight loss. Encouraged to stay away from high salt diet. Recommended to monitor his blood pressure and call in if it is persistently above 140/90.    4. Morbid obesity (H)  BMI today is 36. He has been working hard to loose weight and has lost quite a bit of weight. I congratulated him for a great job and encouraged him to keep up the good work. Continue with daily  exercise. Healthy diet was also discussed. He is motivated.    5. Alcoholism /alcohol abuse (H)  Drink about half a pack of beer per day for years. Not interested in quitting at this time. Discussed with him briefly about long-term and short-term consequences of excessive alcohol intake. Encouraged him to let me know when he is ready to quit or if he needs any assistance with quitting.      Alistair Jasmine Mai, MD  [facility]

## 2017-11-29 ENCOUNTER — TELEPHONE (OUTPATIENT)
Dept: FAMILY MEDICINE | Facility: CLINIC | Age: 57
End: 2017-11-29

## 2017-11-29 ENCOUNTER — HOSPITAL ENCOUNTER (OUTPATIENT)
Dept: CARDIOLOGY | Facility: CLINIC | Age: 57
Discharge: HOME | End: 2017-11-29
Attending: FAMILY MEDICINE | Admitting: FAMILY MEDICINE
Payer: COMMERCIAL

## 2017-11-29 DIAGNOSIS — R07.89 ATYPICAL CHEST PAIN: ICD-10-CM

## 2017-11-29 LAB — CRP SERPL HS-MCNC: 12.2 MG/L

## 2017-11-29 PROCEDURE — 93321 DOPPLER ECHO F-UP/LMTD STD: CPT | Mod: 26 | Performed by: INTERNAL MEDICINE

## 2017-11-29 PROCEDURE — 93350 STRESS TTE ONLY: CPT | Mod: 26 | Performed by: INTERNAL MEDICINE

## 2017-11-29 PROCEDURE — 93018 CV STRESS TEST I&R ONLY: CPT | Performed by: INTERNAL MEDICINE

## 2017-11-29 PROCEDURE — 40000264 ECHO STRESS WITH OPTISON

## 2017-11-29 PROCEDURE — 93325 DOPPLER ECHO COLOR FLOW MAPG: CPT | Mod: 26 | Performed by: INTERNAL MEDICINE

## 2017-11-29 PROCEDURE — 25500064 ZZH RX 255 OP 636: Performed by: INTERNAL MEDICINE

## 2017-11-29 PROCEDURE — 93016 CV STRESS TEST SUPVJ ONLY: CPT | Performed by: INTERNAL MEDICINE

## 2017-11-29 PROCEDURE — 93017 CV STRESS TEST TRACING ONLY: CPT | Performed by: REHABILITATION PRACTITIONER

## 2017-11-29 RX ADMIN — HUMAN ALBUMIN MICROSPHERES AND PERFLUTREN 4 ML: 10; .22 INJECTION, SOLUTION INTRAVENOUS at 11:27

## 2017-11-29 NOTE — TELEPHONE ENCOUNTER
----- Message from Alistair Jasmine Mai, MD sent at 11/29/2017 12:59 PM CST -----  Please let patient know that the stress echocardiogram today was completely normal. This suggests that her chest pain is unlikely from the heart. However he was being preferred to the cardiologist this morning because of elevated CPK level and the chest pain - to be sure that I do not miss anything. The next step would be consider doing upper endoscopy that they know if he is interested.  Follow up if he has any question.

## 2017-11-30 ENCOUNTER — TELEPHONE (OUTPATIENT)
Dept: FAMILY MEDICINE | Facility: CLINIC | Age: 57
End: 2017-11-30

## 2017-11-30 NOTE — TELEPHONE ENCOUNTER
Jeevan Lopez is a 57 year old male who calls with questions about coronary artery spasms.  Patient reports that he did have a stress test done yesterday, but during the night and early this morning he did have a episode of chest pain.  Patient reports that he did some research on the [facility] web site and feels his chest pain is related to coronary artery spasms.  He reports his pain during the night was a pressure type pain.  He reports that he took 2 ASA about 3 am, this was semi-effective, but then took a nitro at 5 am, this was effective for pain, plus getting up and moving around was also helpful.  Patient denies any SOB, nausea, vomiting, or radiating pain.  Patient reports that the type of pain he is having would not show up on a stress test.  Patient does have appointment with Cardiology on Dec. 11th at 2pm.        NURSING ASSESSMENT:  Description:  Chest pain  Onset/duration:  Ongoing issues.   Associated symptoms:  Denies.   Improves/worsens symptoms:  No change.   Pain scale (0-10)   7/10, when chest pain starts.   Last exam/Treatment:  11/28/2017  Allergies:   Allergies   Allergen Reactions     Lisinopril Other (See Comments)     Dry hacky cough     NURSING PLAN: Routed to provider Yes    RECOMMENDED DISPOSITION:  Patient denies need for emergent care, as he reports this pain is related to coronary artery spasms per his Internet research.  Patient was encouraged to seek emergent care.  Patient requested that message be sent to PCP for review.   Will comply with recommendation: Yes  If further questions/concerns or if symptoms do not improve, worsen or new symptoms develop, call your PCP or [facility] as soon as possible.    Guideline used:  Chest pain  Telephone Triage Protocols for Nurses, Fifth Edition, Odalys Diana RN

## 2017-11-30 NOTE — TELEPHONE ENCOUNTER
Patient called back and stated he would like you to look at this website because it describes his exact symptoms.   [email or web address]    Pt states he had another episode last night at 4am and he took 2 baby aspirin and within about 40 min, he had anther episode of his chest pain. He got up again and took Nitro for the first time and that worked well, he was able to go to back to sleep for about another 2.5 hours without an issue. Please call and advise.

## 2017-11-30 NOTE — TELEPHONE ENCOUNTER
Please let patient know that it certainly he may have angina.  As discussed, recommend to to go to the ER when he has the chest pain to be sure that you did not have a MI.  If has concern, please have him to follow up tomorrow to discuss more about it.

## 2017-12-01 NOTE — TELEPHONE ENCOUNTER
Have him take the nitro for now. Told him that I reviewed the link. Follow on Monday so I can start him new med - maybe long acting nitro.  thanks

## 2017-12-01 NOTE — TELEPHONE ENCOUNTER
Patient called back, wondering if Dr. Martinez looked at the link he sent. He really thinks he is having a coronary artery spasm, not angina. He states he is glad he has the nitro because it works. He told me he thinks this is a temporary thing and will go away. But maybe if Dr. Martinez prescribes a statin it will help. He does not want to go to the ER when he has the pain because he doesn't think they will be able to do anything. Please advise.

## 2017-12-04 ENCOUNTER — OFFICE VISIT (OUTPATIENT)
Dept: FAMILY MEDICINE | Facility: CLINIC | Age: 57
End: 2017-12-04
Payer: COMMERCIAL

## 2017-12-04 VITALS
DIASTOLIC BLOOD PRESSURE: 80 MMHG | TEMPERATURE: 97.8 F | SYSTOLIC BLOOD PRESSURE: 132 MMHG | BODY MASS INDEX: 36.62 KG/M2 | OXYGEN SATURATION: 97 % | WEIGHT: 251.6 LBS | HEART RATE: 82 BPM

## 2017-12-04 DIAGNOSIS — R07.89 ATYPICAL CHEST PAIN: Primary | ICD-10-CM

## 2017-12-04 PROCEDURE — 99213 OFFICE O/P EST LOW 20 MIN: CPT | Performed by: FAMILY MEDICINE

## 2017-12-04 ASSESSMENT — PAIN SCALES - GENERAL: PAINLEVEL: NO PAIN (0)

## 2017-12-04 NOTE — NURSING NOTE
"Chief Complaint   Patient presents with     Chest Pain       Initial /80 (Cuff Size: Adult Large)  Pulse 82  Temp 97.8  F (36.6  C) (Temporal)  Wt 251 lb 9.6 oz (114.1 kg)  SpO2 97%  BMI 36.62 kg/m2 Estimated body mass index is 36.62 kg/(m^2) as calculated from the following:    Height as of 3/14/17: 5' 9.5\" (1.765 m).    Weight as of this encounter: 251 lb 9.6 oz (114.1 kg).  Medication Reconciliation: complete   Juana Baker CMA (AAMA)      "

## 2017-12-04 NOTE — MR AVS SNAPSHOT
"              After Visit Summary   12/4/2017    Jeevan Lopez    MRN: [id number]           Patient Information     Date Of Birth          1960        Visit Information        Provider Department      12/4/2017 9:00 AM Alistair Martinez MD [facility]        Today's Diagnoses     Atypical chest pain    -  1       Follow-ups after your visit        Follow-up notes from your care team     Return if symptoms worsen or fail to improve.      Your next 10 appointments already scheduled     Dec 11, 2017  2:00 PM CST   New Visit with Dung Montes MD   [facility] ([facility])    [address] [place] [id number] [phone]              Who to contact     If you have questions or need follow up information about today's clinic visit or your schedule please contact [facility] directly at [phone].  Normal or non-critical lab and imaging results will be communicated to you by MyChart, letter or phone within 4 business days after the clinic has received the results. If you do not hear from us within 7 days, please contact the clinic through MyChart or phone. If you have a critical or abnormal lab result, we will notify you by phone as soon as possible.  Submit refill requests through [employer] or call your pharmacy and they will forward the refill request to us. Please allow 3 business days for your refill to be completed.          Additional Information About Your Visit        MyChart Information     [employer] lets you send messages to your doctor, view your test results, renew your prescriptions, schedule appointments and more. To sign up, go to [email or web address] . Click on \"Log in\" on the left side of the screen, which will take you to the Welcome page. Then click on \"Sign up Now\" on the right side of the page.     You will be asked to enter the access code listed below, as well as some personal " information. Please follow the directions to create your username and password.     Your access code is: [email or web address]  Expires: 2018 11:34 AM     Your access code will  in 90 days. If you need help or a new code, please call your [facility] clinic or [phone].        Care EveryWhere ID     This is your Care EveryWhere ID. This could be used by other organizations to access your [facility] medical records  [id number]        Your Vitals Were     Pulse Temperature Pulse Oximetry BMI (Body Mass Index)          82 97.8  F (36.6  C) (Temporal) 97% 36.62 kg/m2         Blood Pressure from Last 3 Encounters:   17 132/80   17 131/79   17 138/86    Weight from Last 3 Encounters:   17 251 lb 9.6 oz (114.1 kg)   17 252 lb (114.3 kg)   17 268 lb 3.2 oz (121.7 kg)              Today, you had the following     No orders found for display       Primary Care Provider Office Phone # Fax #    Davidansley Jasmine Mai, [phone] [address] [place] [address]        Equal Access to Services     TERE MACKAY AH: Hadii mayito gutierrez hadasho Soomaali, waaxda luqadaha, qaybta kaalmada adeegyada, dayanara arenas. So [facility] [phone].    ATENCIÓN: Si habla español, tiene a antoine disposición servicios gratuitos de asistencia lingüística. Llame al [phone].    We comply with applicable federal civil rights laws and [place] laws. We do not discriminate on the basis of race, color, national origin, age, disability, sex, sexual orientation, or gender identity.            Thank you!     Thank you for choosing [facility]  for your care. Our goal is always to provide you with excellent care. Hearing back from our patients is one way we can continue to improve our services. Please take a few minutes to complete the written survey that you may receive in the mail after your visit with us. Thank you!             Your Updated Medication List - Protect  others around you: Learn how to safely use, store and throw away your medicines at www.disposemymeds.org.          This list is accurate as of: 12/4/17 11:59 PM.  Always use your most recent med list.                   Brand Name Dispense Instructions for use Diagnosis    amLODIPine 5 MG tablet    NORVASC    90 tablet    Take 5 mg by mouth daily    Hypertension goal BP (blood pressure) < 140/90       ASPIRIN LOW DOSE 81 MG tablet   Generic drug:  aspirin      Take 1 tablet by mouth daily.        B-12 PO           FISH OIL CONCENTRATE PO           fluticasone 50 MCG/ACT spray    FLONASE    16 g    USE 2 SPRAYS IN EACH NOSTRIL ONCE DAILY    Chronic rhinitis, unspecified type       GARLIC-X PO           GLUCOSAMINE CHONDR COMPLEX PO      Take  by mouth.        hydrochlorothiazide 25 MG tablet    HYDRODIURIL    90 tablet    Take 1 tablet (25 mg) by mouth daily    Hypertension goal BP (blood pressure) < 140/90       MAGNESIUM CITRATE PO           nitroGLYcerin 0.4 MG sublingual tablet    NITROSTAT    25 tablet    For chest pain place 1 tablet under the tongue every 5 minutes for 3 doses. If symptoms persist 5 minutes after 1st dose call 911.    Atypical chest pain       omeprazole 20 MG CR capsule    priLOSEC    90 capsule    Take 1 capsule (20 mg) by mouth daily    Gastroesophageal reflux disease, esophagitis presence not specified       order for DME      Equipment ordered: RESMED Auto PAP Mask type: Full face  Settings: 5-15 cm H2O        SAW PALMETTO CONCENTRATE OR           VITAMIN B-1 PO

## 2017-12-04 NOTE — PROGRESS NOTES
SUBJECTIVE:   Jeevan Lopez is a 57 year old male who presents to clinic today for the following health issues:      Follow up chest pain      Jeevan is here today for follow-up on the chest pain.  He was seen for this on November 28, 2017 and please see my dictation that day for further details.  Since the last visit, patient states that overall he feels about the same.  He continued to have the nocturnal chest pain that wakes him up at night.  He tried the nitroglycerin and it worked welll, took the pain away completely.  Stated that he was able to rest for several hours after that.  He had a stress echocardiogram done last week and it was completely normal.  He noted the pain got worse after the stress echocardiogram.  No associated shortness of breath or diaphoresis.  He has been doing some research and he convince that he has coronary artery spasm.  He refused to go to the ER even with the recommendation.  He again does not think this is his heartburn.  He still drinks about 6 beers a day.  Also has been biking about 6-8 miles every night.  CP does not trigger or worsen by exertion, rather relief with sitting upright.  Denies associated shortness of breath.  No headache or dizziness.  No other concern today.      Problem list and histories reviewed & adjusted, as indicated.  Additional history: as documented    Current Outpatient Prescriptions   Medication Sig Dispense Refill     MAGNESIUM CITRATE PO        Omega-3 Fatty Acids (FISH OIL CONCENTRATE PO)        GARLIC-X PO        Saw Palmetto, Serenoa repens, (SAW PALMETTO CONCENTRATE OR)        Thiamine HCl (VITAMIN B-1 PO)        nitroGLYcerin (NITROSTAT) 0.4 MG sublingual tablet For chest pain place 1 tablet under the tongue every 5 minutes for 3 doses. If symptoms persist 5 minutes after 1st dose call 911. 25 tablet 0     amLODIPine (NORVASC) 5 MG tablet Take 5 mg by mouth daily  90 tablet 1     hydrochlorothiazide (HYDRODIURIL) 25 MG tablet Take 1 tablet (25  mg) by mouth daily 90 tablet 1     omeprazole (PRILOSEC) 20 MG CR capsule Take 1 capsule (20 mg) by mouth daily 90 capsule 3     fluticasone (FLONASE) 50 MCG/ACT spray USE 2 SPRAYS IN EACH NOSTRIL ONCE DAILY 16 g 10     order for DME Equipment ordered: RESMED Auto PAP Mask type: Full face  Settings: 5-15 cm H2O       Cyanocobalamin (B-12 PO)        aspirin (ASPIRIN LOW DOSE) 81 MG tablet Take 1 tablet by mouth daily.       Glucosamine-Chondroitin (GLUCOSAMINE CHONDR COMPLEX PO) Take  by mouth.       Allergies   Allergen Reactions     Lisinopril Other (See Comments)     Dry hacky cough       Reviewed and updated as needed this visit by clinical staff  Tobacco  Allergies  Med Hx  Surg Hx  Fam Hx  Soc Hx      Reviewed and updated as needed this visit by Provider         ROS:  Constitutional, HEENT, cardiovascular, pulmonary, gi and gu systems are negative, except as otherwise noted.      OBJECTIVE:   /80 (Cuff Size: Adult Large)  Pulse 82  Temp 97.8  F (36.6  C) (Temporal)  Wt 251 lb 9.6 oz (114.1 kg)  SpO2 97%  BMI 36.62 kg/m2  Body mass index is 36.62 kg/(m^2).  GENERAL: healthy, alert and no distress  RESP: lungs clear to auscultation - no rales, rhonchi or wheezes  CV: regular rate and rhythm, no murmur, no peripheral edema and peripheral pulses strong  ABDOMEN: soft, non-tender and bowel sounds normal  MS: no gross musculoskeletal defects noted, no edema. No focal weakness    Diagnostic Test Results:  Results for orders placed or performed during the hospital encounter of 17   ECHO STRESS WITH OPTISON    Narrative    [id number]  [facility]  [id number]  [id number]^[facility]^[place]           [facility]  [facility]chocardi[facility]  [address] [place] [address]        Name: REGGIE MENDEZ  MRN: [id number]  : 1960  Study Date: 2017 11:13 AM  Age: 57 yrs  Gender: Male  Patient Location: [facility]  Reason For Study: , Atypical chest pain  History: Family HX,  HTN  Ordering Physician: EDGARDO ROWE  Referring Physician: EDGARDO ROWE  Performed By: Christiano Yeung     BSA: 2.3 m2  Height: 70 in  Weight: 252 lb  HR: 74  BP: 132/80 mmHg  _____________________________________________________________________________  __        Procedure  Stress Echo Complete. Contrast Optison.  _____________________________________________________________________________  __        Interpretation Summary  Normal resting heart rate and blood pressure with normal response to exercise  Normal exercise capacity for age  Normal resting ECG without ischemic changes postexercise. Bigeminal PVCs seen  in the recovery phase  Normal resting LV function with good augmentation of all wall segments  postexercise. No wall motion abnormalities are seen.  This test indicates low probability of significant exercise induced myocardial  ischemia.  _____________________________________________________________________________  __     Stress  Exercise was stopped due to dyspnea.  There was a normal BP response to exercise.  The EKG portion of this stress test was negative for inducible ischemia (see  echo results below).  Bigeminal PVCs were seen for over 1 minute, beginning at 45 seconds into  recovery. To a mild degree this interfered with echo imaging.  Target Heart Rate was achieved.  Normal left ventricular function and wall motion at rest and post-stress.  The visual ejection fraction is estimated at >70%.  Global LV systolic function augments with exercise.  Left ventricular cavity size decreases with exercise.     Baseline  Normal baseline electrocardiogram.  The visual ejection fraction is estimated at 60-65%.  Normal left ventricular function and wall motion at rest.     Stress Results            Protocol:  [place] ROSENDO        Maximum Predicted HR:   165 bpm            Target HR: 140 bpm         % Maximum Predicted HR: 85 %        Stage  DurationHeart Rate  BP                    Comment            (mm:ss)    (bpm)    Stage 1   3:00     115    144/78No CP    Stage 2   3:00     129    174/78No CP    Stage 3   3:00     140    188/78No CP, Duke Score 1, FAC Average, RPP [id number]   RecoveryR  6:00      97    144/78               Stress Duration:   9:00 mm:ss *        Recovery Time: 6:00 mm:ss         Maximum Stress HR: 140 bpm             METS:          10     Mitral Valve  The mitral valve is normal in structure and function.        Tricuspid Valve  There is physiologic tricuspid regurgitation. The right ventricular systolic  pressure is approximated at 28mmHg plus the right atrial pressure.     Aortic Valve  The aortic valve is normal in structure and function.  _____________________________________________________________________________  __           Doppler Measurements & Calculations  TR max madelaine: 265.0 cm/sec  TR max P.1 mmHg           _____________________________________________________________________________  __           Report approved by: Horacio Correa 2017 12:36 PM          ASSESSMENT/PLAN:       ICD-10-CM    1. Atypical chest pain R07.89      Happens only at night and often wakes him up from his sleep.  The pain relieved with sitting upright and taking aspirin.  Recent workup showed normal troponin level as well as CMP and TSH.  His cardiac CRP level was elevated.  Stress echocardiogram was completely normal.  CP responded to the nitroglycerin.  He drinks alcohol about 6 beers a day but he does not think this is his heartburn pain.  Agree that most likely he has angina with coronary spasm; but still need to rule CAD.  I recommended to start him on Imdur and Lipitor.  Continue with Norvasc and aspirin but no more than 325 mg daily.  Encouraged him to stop drinking.  Patient does not want to start that Lipitor nor the Imdur at this point.  He will continue with the Norvasc and prn nitroglycerin.  He has an appointment with the cardiologist next week and he would like to consult with cardiac  cardiologist before considering to start on these medications.  His cholesterol was checked recently and it was on the higher side.  He does not smoke.  Denies of drug use.  He otherwise follow-up as needed.  Recommend to go to the emergency room if chest pain persists or is worsening especially if developed associated diaphoresis, shortness of breath or if he has any concern.    Alistair Jasmine Mai, MD  [facility]

## 2017-12-11 ENCOUNTER — OFFICE VISIT (OUTPATIENT)
Dept: CARDIOLOGY | Facility: CLINIC | Age: 57
End: 2017-12-11
Payer: COMMERCIAL

## 2017-12-11 VITALS
SYSTOLIC BLOOD PRESSURE: 142 MMHG | HEART RATE: 84 BPM | OXYGEN SATURATION: 96 % | DIASTOLIC BLOOD PRESSURE: 88 MMHG | HEIGHT: 70 IN | BODY MASS INDEX: 35.68 KG/M2 | WEIGHT: 249.2 LBS

## 2017-12-11 DIAGNOSIS — R07.89 ATYPICAL CHEST PAIN: ICD-10-CM

## 2017-12-11 DIAGNOSIS — I10 HYPERTENSION GOAL BP (BLOOD PRESSURE) < 140/90: ICD-10-CM

## 2017-12-11 DIAGNOSIS — R07.9 ACUTE CHEST PAIN: Primary | ICD-10-CM

## 2017-12-11 PROCEDURE — 99204 OFFICE O/P NEW MOD 45 MIN: CPT | Performed by: INTERNAL MEDICINE

## 2017-12-11 RX ORDER — AMLODIPINE BESYLATE 5 MG/1
5 TABLET ORAL
Qty: 180 TABLET | Refills: 3 | Status: SHIPPED | OUTPATIENT
Start: 2017-12-11 | End: 2019-01-07

## 2017-12-11 NOTE — LETTER
12/11/2017    Alistair Jasmine Mai, MD  [address] [place] [address]    RE: Jeevan Lopez       Dear Colleague,    I had the pleasure of seeing Jeevan Lopez in the [facility].    HISTORY:    Jeevan Lopez is a pleasant 57-year-old male with no previous cardiac history who was asked to see me for evaluation of chest discomfort. He has a many year history of intermittent chest discomfort, and was seen by cardiology for this in 2011. He also has a history of hypertension.    Jeevan describes a chest discomfort as a pressure located in his mid chest and not radiating elsewhere. This is associated with a sense of diaphoresis but not nausea or shortness of breath. The episodes only occur while he is asleep between 2 and 5 AM. In the past and has radiated to the jaw but not recently. The pain persisted as long as he's lying down but sitting up or walking will relieve the discomfort. He has had episodes of chest pain going back 15 years, but recently became more severe and frequent and a stress echo was done. This was completely normal.    The patient recently purchased a known bike and has been exercising vigorously. He reports that he's put on 500 miles on the bike in the last 2 months has lost 22 pounds. With this exercise he has never experienced chest discomfort. He does have a history of heartburn which she reports feels considerably different than his current episodes of discomfort. At about the time he had a stress test on the started using his p.r.n. Prilosec on a daily basis and since that time he has had no further severe episodes.    Cardiac risk factors include a history of hypertension as a family history his father undergoing bypass surgery at age 65. His father was a smoker. The patient himself has never been a smoker and has no history of diabetes. He does not have hyperlipidemia.    The patient was given sublingual nitroglycerin and tried it once. His discomfort was relieved  quickly, but it typically is rather brief in duration and is not entirely clear that the nitroglycerin had any effect, although the patient is convinced that it did.      ASSESSMENT/PLAN:    1.  Chest pain. This comes on only at night, is positional (only while lying flat and relieved by upright position) and has gotten better since he began taking Prilosec on a daily basis. He is convinced, however, with his long history of heartburn, that this is a different sensation. He believes it is coronary spasm. Unfortunately, he's never had an ECG done during an episode. Coronary spasm or esophageal spasm or possible etiologies. He is already on Norvasc but with daily dosing his episodes of chest pain or occurring near his trough blood level. Since his blood pressures a bit high anyway, using Norvasc 5 mg b.i.d. would make sense.  If this is unsuccessful, a trial of long-acting nitrates would be indicated.  2. Borderline hyperlipidemia. This stress test is reassuring but I suggested that patient may consider doing a calcium scoring exam helped decide whether he would benefit from long-term statin therapy with his LDL cholesterol of 120. He understands that this is not covered by insurance but is interested in pursuing this.  CRP is also noted to be markedly elevated at 12.  3. Hypertension. As noted, blood pressure tends to be a bit on the high side. He is exercising and losing weight and has noticed a decrease in his blood pressure but I still think the Norvasc being doubled will be helpful for him in the long run.  4. GERD. I still suspect that his symptoms are gastrointestinal and he is willing to continue using his Prilosec on a daily basis    Thank you for asking me to participate in your patient's care. I am planning on having him back in about 2 months, but would be happy to see him sooner should the need arise. Please don't hesitate to call if I can be of further assistance.    Orders Placed This Encounter    Procedures     CT Coronary Calcium Scan     Follow-Up with Cardiologist     Orders Placed This Encounter   Medications     amLODIPine (NORVASC) 5 MG tablet     Sig: Take 1 tablet (5 mg) by mouth 2 times daily     Dispense:  180 tablet     Refill:  3     Medications Discontinued During This Encounter   Medication Reason     amLODIPine (NORVASC) 5 MG tablet Reorder         Encounter Diagnoses   Name Primary?     Acute chest pain Yes     Atypical chest pain      Hypertension goal BP (blood pressure) < 140/90        CURRENT MEDICATIONS:  Current Outpatient Prescriptions   Medication Sig Dispense Refill     amLODIPine (NORVASC) 5 MG tablet Take 1 tablet (5 mg) by mouth 2 times daily 180 tablet 3     MAGNESIUM CITRATE PO        Omega-3 Fatty Acids (FISH OIL CONCENTRATE PO)        GARLIC-X PO        Saw Palmetto, Serenoa repens, (SAW PALMETTO CONCENTRATE OR)        Thiamine HCl (VITAMIN B-1 PO)        nitroGLYcerin (NITROSTAT) 0.4 MG sublingual tablet For chest pain place 1 tablet under the tongue every 5 minutes for 3 doses. If symptoms persist 5 minutes after 1st dose call 911. 25 tablet 0     hydrochlorothiazide (HYDRODIURIL) 25 MG tablet Take 1 tablet (25 mg) by mouth daily 90 tablet 1     omeprazole (PRILOSEC) 20 MG CR capsule Take 1 capsule (20 mg) by mouth daily 90 capsule 3     fluticasone (FLONASE) 50 MCG/ACT spray USE 2 SPRAYS IN EACH NOSTRIL ONCE DAILY 16 g 10     order for DME Equipment ordered: RESMED Auto PAP Mask type: Full face  Settings: 5-15 cm H2O       Cyanocobalamin (B-12 PO)        aspirin (ASPIRIN LOW DOSE) 81 MG tablet Take 1 tablet by mouth daily.       Glucosamine-Chondroitin (GLUCOSAMINE CHONDR COMPLEX PO) Take  by mouth.       [DISCONTINUED] amLODIPine (NORVASC) 5 MG tablet Take 5 mg by mouth 2 times daily 90 tablet 1       ALLERGIES     Allergies   Allergen Reactions     Lisinopril Other (See Comments)     Dry hacky cough       PAST MEDICAL HISTORY:  Past Medical History:   Diagnosis Date      Adenomatous polyp of colon 9/2013    q 5 yr colonoscopy     Bell's palsy 1/17/2007     Deviated septum 9/17/2013    See CT scan     Hypertension      LVH (left ventricular hypertrophy) 6/7/2011    see stress echo     NI (obstructive sleep apnea) 2016    moderate - dental appliance     Sensory polyneuropathy 2014    feet - see neuro consult     VENTRAL HERNIA NEC 6/19/2007       PAST SURGICAL HISTORY:  Past Surgical History:   Procedure Laterality Date     COLONOSCOPY  9/30/2013    Procedure: COMBINED COLONOSCOPY, SINGLE BIOPSY/POLYPECTOMY BY BIOPSY;  colonoscopy, polypectomy by biopsy;  Surgeon: Pedro Chris MD;  Location: [facility]     COLONOSCOPY N/A 8/7/2017    Procedure: COLONOSCOPY;  colonoscopy;  Surgeon: Kiel Solomon MD;  Location: [facility]     EXCISE MASS HEAD  11/18/2013    Procedure: EXCISE MASS HEAD;  Excision of left forehead mass;  Surgeon: Pelon Echavarria MD;  Location: [facility]     HC REPAIR UMBILICAL YVONNE,5+Y/O, INCARCERATED OR STRANGULATED  6/9/2004     LASER YAG CAPSULOTOMY Left 6/2/2016    Procedure: LASER YAG CAPSULOTOMY;  Surgeon: Joss Raza MD;  Location: [facility]     PHACOEMULSIFICATION WITH STANDARD INTRAOCULAR LENS IMPLANT Left 11/5/2015    Procedure: PHACOEMULSIFICATION WITH STANDARD INTRAOCULAR LENS IMPLANT;  Surgeon: Joss Raza MD;  Location: [facility]       FAMILY HISTORY:  Family History   Problem Relation Age of Onset     DIABETES Father      Hypertension Father      HEART DISEASE Father      double bypass-smoker     CANCER Father      lung, brain     Hypertension Brother      Hypertension Mother      Unknown/Adopted Maternal Grandmother      Unknown/Adopted Maternal Grandfather      Unknown/Adopted Paternal Grandmother      Unknown/Adopted Paternal Grandfather        SOCIAL HISTORY:  Social History     Social History     Marital status:      Spouse name: N/A     Number of children: N/A     Years of education: N/A     Social History Main Topics      "Smoking status: Never Smoker     Smokeless tobacco: Never Used     Alcohol use 0.0 oz/week     0 Standard drinks or equivalent per week      Comment: 24 beers per week     Drug use: No     Sexual activity: Yes     Partners: Female      Comment: single, 1 child, work - self employed     Other Topics Concern     Not on file     Social History Narrative       Review of Systems:  Skin:  Negative     Eyes:  Negative    ENT:  Negative    Respiratory:  Negative    Cardiovascular:    chest pain;Positive for  Gastroenterology: Negative    Genitourinary:  Negative    Musculoskeletal:  Negative    Neurologic:  Negative    Psychiatric:  Positive for sleep disturbances  Heme/Lymph/Imm:  Positive for allergies  Endocrine:  Negative      Physical Exam:  Vitals: /88 (BP Location: Right arm, Patient Position: Fowlers, Cuff Size: Adult Large)  Pulse 84  Ht 1.765 m (5' 9.5\")  Wt 113 kg (249 lb 3.2 oz)  SpO2 96%  BMI 36.27 kg/m2    Constitutional:  cooperative, alert and oriented, well developed, well nourished, in no acute distress overweight      Skin:  warm and dry to the touch        Head:  normocephalic        Eyes:  no xanthalasma        ENT:  no pallor or cyanosis        Neck:  carotid pulses are full and equal bilaterally, JVP normal, no carotid bruit        Chest:  normal breath sounds, clear to auscultation, normal A-P diameter, normal symmetry, normal respiratory excursion, no use of accessory muscles        Cardiac: regular rhythm, normal S1/S2, no S3 or S4, apical impulse not displaced, no murmurs, gallops or rubs                  Abdomen:  abdomen soft;BS normoactive        Vascular: pulses full and equal                                      Extremities and Back:  no edema        Neurological:  no gross motor deficits          Recent Lab Results:  LIPID RESULTS:  Lab Results   Component Value Date    CHOL 190 07/19/2017    HDL 48 07/19/2017     (H) 07/19/2017    TRIG 103 07/19/2017    CHOLHDLRATIO 2.9 " 10/30/2015       LIVER ENZYME RESULTS:  Lab Results   Component Value Date    AST 16 11/28/2017    ALT 17 11/28/2017       CBC RESULTS:  Lab Results   Component Value Date    WBC 8.8 11/28/2017    RBC 5.24 11/28/2017    HGB 15.6 11/28/2017    HCT 47.8 11/28/2017    MCV 91 11/28/2017    MCH 29.8 11/28/2017    MCHC 32.6 11/28/2017    RDW 14.0 11/28/2017     11/28/2017       BMP RESULTS:  Lab Results   Component Value Date     11/28/2017    POTASSIUM 4.1 11/28/2017    CHLORIDE 101 11/28/2017    CO2 30 11/28/2017    ANIONGAP 6 11/28/2017     (H) 11/28/2017    BUN 16 11/28/2017    CR 0.86 11/28/2017    GFRESTIMATED >90 11/28/2017    GFRESTBLACK >90 11/28/2017    HAYLEY 9.1 11/28/2017        A1C RESULTS:  Lab Results   Component Value Date    A1C 5.0 08/02/2017       INR RESULTS:  No results found for: INR    Thank you for allowing me to participate in the care of your patient.    Sincerely,     Dung Montes MD     [facility]

## 2017-12-11 NOTE — PROGRESS NOTES
HISTORY:    Jeevan Lopez is a pleasant 57-year-old male with no previous cardiac history who was asked to see me for evaluation of chest discomfort. He has a many year history of intermittent chest discomfort, and was seen by cardiology for this in 2011. He also has a history of hypertension.    Jeevan describes a chest discomfort as a pressure located in his mid chest and not radiating elsewhere. This is associated with a sense of diaphoresis but not nausea or shortness of breath. The episodes only occur while he is asleep between 2 and 5 AM. In the past and has radiated to the jaw but not recently. The pain persisted as long as he's lying down but sitting up or walking will relieve the discomfort. He has had episodes of chest pain going back 15 years, but recently became more severe and frequent and a stress echo was done. This was completely normal.    The patient recently purchased a known bike and has been exercising vigorously. He reports that he's put on 500 miles on the bike in the last 2 months has lost 22 pounds. With this exercise he has never experienced chest discomfort. He does have a history of heartburn which she reports feels considerably different than his current episodes of discomfort. At about the time he had a stress test on the started using his p.r.n. Prilosec on a daily basis and since that time he has had no further severe episodes.    Cardiac risk factors include a history of hypertension as a family history his father undergoing bypass surgery at age 65. His father was a smoker. The patient himself has never been a smoker and has no history of diabetes. He does not have hyperlipidemia.    The patient was given sublingual nitroglycerin and tried it once. His discomfort was relieved quickly, but it typically is rather brief in duration and is not entirely clear that the nitroglycerin had any effect, although the patient is convinced that it did.      ASSESSMENT/PLAN:    1.  Chest pain. This  comes on only at night, is positional (only while lying flat and relieved by upright position) and has gotten better since he began taking Prilosec on a daily basis. He is convinced, however, with his long history of heartburn, that this is a different sensation. He believes it is coronary spasm. Unfortunately, he's never had an ECG done during an episode. Coronary spasm or esophageal spasm or possible etiologies. He is already on Norvasc but with daily dosing his episodes of chest pain or occurring near his trough blood level. Since his blood pressures a bit high anyway, using Norvasc 5 mg b.i.d. would make sense.  If this is unsuccessful, a trial of long-acting nitrates would be indicated.  2. Borderline hyperlipidemia. This stress test is reassuring but I suggested that patient may consider doing a calcium scoring exam helped decide whether he would benefit from long-term statin therapy with his LDL cholesterol of 120. He understands that this is not covered by insurance but is interested in pursuing this.  CRP is also noted to be markedly elevated at 12.  3. Hypertension. As noted, blood pressure tends to be a bit on the high side. He is exercising and losing weight and has noticed a decrease in his blood pressure but I still think the Norvasc being doubled will be helpful for him in the long run.  4. GERD. I still suspect that his symptoms are gastrointestinal and he is willing to continue using his Prilosec on a daily basis    Thank you for asking me to participate in your patient's care. I am planning on having him back in about 2 months, but would be happy to see him sooner should the need arise. Please don't hesitate to call if I can be of further assistance.    Orders Placed This Encounter   Procedures     CT Coronary Calcium Scan     Follow-Up with Cardiologist     Orders Placed This Encounter   Medications     amLODIPine (NORVASC) 5 MG tablet     Sig: Take 1 tablet (5 mg) by mouth 2 times daily      Dispense:  180 tablet     Refill:  3     Medications Discontinued During This Encounter   Medication Reason     amLODIPine (NORVASC) 5 MG tablet Reorder         Encounter Diagnoses   Name Primary?     Acute chest pain Yes     Atypical chest pain      Hypertension goal BP (blood pressure) < 140/90        CURRENT MEDICATIONS:  Current Outpatient Prescriptions   Medication Sig Dispense Refill     amLODIPine (NORVASC) 5 MG tablet Take 1 tablet (5 mg) by mouth 2 times daily 180 tablet 3     MAGNESIUM CITRATE PO        Omega-3 Fatty Acids (FISH OIL CONCENTRATE PO)        GARLIC-X PO        Saw Palmetto, Serenoa repens, (SAW PALMETTO CONCENTRATE OR)        Thiamine HCl (VITAMIN B-1 PO)        nitroGLYcerin (NITROSTAT) 0.4 MG sublingual tablet For chest pain place 1 tablet under the tongue every 5 minutes for 3 doses. If symptoms persist 5 minutes after 1st dose call 911. 25 tablet 0     hydrochlorothiazide (HYDRODIURIL) 25 MG tablet Take 1 tablet (25 mg) by mouth daily 90 tablet 1     omeprazole (PRILOSEC) 20 MG CR capsule Take 1 capsule (20 mg) by mouth daily 90 capsule 3     fluticasone (FLONASE) 50 MCG/ACT spray USE 2 SPRAYS IN EACH NOSTRIL ONCE DAILY 16 g 10     order for DME Equipment ordered: RESMED Auto PAP Mask type: Full face  Settings: 5-15 cm H2O       Cyanocobalamin (B-12 PO)        aspirin (ASPIRIN LOW DOSE) 81 MG tablet Take 1 tablet by mouth daily.       Glucosamine-Chondroitin (GLUCOSAMINE CHONDR COMPLEX PO) Take  by mouth.       [DISCONTINUED] amLODIPine (NORVASC) 5 MG tablet Take 5 mg by mouth 2 times daily 90 tablet 1       ALLERGIES     Allergies   Allergen Reactions     Lisinopril Other (See Comments)     Dry hacky cough       PAST MEDICAL HISTORY:  Past Medical History:   Diagnosis Date     Adenomatous polyp of colon 9/2013    q 5 yr colonoscopy     Bell's palsy 1/17/2007     Deviated septum 9/17/2013    See CT scan     Hypertension      LVH (left ventricular hypertrophy) 6/7/2011    see stress echo      NI (obstructive sleep apnea) 2016    moderate - dental appliance     Sensory polyneuropathy 2014    feet - see neuro consult     VENTRAL HERNIA NEC 6/19/2007       PAST SURGICAL HISTORY:  Past Surgical History:   Procedure Laterality Date     COLONOSCOPY  9/30/2013    Procedure: COMBINED COLONOSCOPY, SINGLE BIOPSY/POLYPECTOMY BY BIOPSY;  colonoscopy, polypectomy by biopsy;  Surgeon: Pedro Chris MD;  [facility]     COLONOSCOPY N/A 8/7/2017    Procedure: COLONOSCOPY;  colonoscopy;  Surgeon: Kiel Solomon MD;  [facility]: [facility]     EXCISE MASS HEAD  11/18/2013    Procedure: EXCISE MASS HEAD;  Excision of left forehead mass;  Surgeon: Pelon Echavarria MD;  [facility]     HC REPAIR UMBILICAL YVONNE,5+Y/O, INCARCERATED OR STRANGULATED  6/9/2004     LASER YAG CAPSULOTOMY Left 6/2/2016    Procedure: LASER YAG CAPSULOTOMY;  Surgeon: Joss Raza MD;  [facility]     PHACOEMULSIFICATION WITH STANDARD INTRAOCULAR LENS IMPLANT Left 11/5/2015    Procedure: PHACOEMULSIFICATION WITH STANDARD INTRAOCULAR LENS IMPLANT;  Surgeon: Joss Raza MD;  [facility]       FAMILY HISTORY:  Family History   Problem Relation Age of Onset     DIABETES Father      Hypertension Father      HEART DISEASE Father      double bypass-smoker     CANCER Father      lung, brain     Hypertension Brother      Hypertension Mother      Unknown/Adopted Maternal Grandmother      Unknown/Adopted Maternal Grandfather      Unknown/Adopted Paternal Grandmother      Unknown/Adopted Paternal Grandfather        SOCIAL HISTORY:  Social History     Social History     Marital status:      Spouse name: N/A     Number of children: N/A     Years of education: N/A     Social History Main Topics     Smoking status: Never Smoker     Smokeless tobacco: Never Used     Alcohol use 0.0 oz/week     0 Standard drinks or equivalent per week      Comment: 24 beers per week     Drug use: No     Sexual activity: Yes      "Partners: Female      Comment: single, 1 child, work - self employed     Other Topics Concern     Not on file     Social History Narrative       Review of Systems:  Skin:  Negative     Eyes:  Negative    ENT:  Negative    Respiratory:  Negative    Cardiovascular:    chest pain;Positive for  Gastroenterology: Negative    Genitourinary:  Negative    Musculoskeletal:  Negative    Neurologic:  Negative    Psychiatric:  Positive for sleep disturbances  Heme/Lymph/Imm:  Positive for allergies  Endocrine:  Negative      Physical Exam:  Vitals: /88 (BP Location: Right arm, Patient Position: Fowlers, Cuff Size: Adult Large)  Pulse 84  Ht 1.765 m (5' 9.5\")  Wt 113 kg (249 lb 3.2 oz)  SpO2 96%  BMI 36.27 kg/m2    Constitutional:  cooperative, alert and oriented, well developed, well nourished, in no acute distress overweight      Skin:  warm and dry to the touch        Head:  normocephalic        Eyes:  no xanthalasma        ENT:  no pallor or cyanosis        Neck:  carotid pulses are full and equal bilaterally, JVP normal, no carotid bruit        Chest:  normal breath sounds, clear to auscultation, normal A-P diameter, normal symmetry, normal respiratory excursion, no use of accessory muscles        Cardiac: regular rhythm, normal S1/S2, no S3 or S4, apical impulse not displaced, no murmurs, gallops or rubs                  Abdomen:  abdomen soft;BS normoactive        Vascular: pulses full and equal                                      Extremities and Back:  no edema        Neurological:  no gross motor deficits          Recent Lab Results:  LIPID RESULTS:  Lab Results   Component Value Date    CHOL 190 07/19/2017    HDL 48 07/19/2017     (H) 07/19/2017    TRIG 103 07/19/2017    CHOLHDLRATIO 2.9 10/30/2015       LIVER ENZYME RESULTS:  Lab Results   Component Value Date    AST 16 11/28/2017    ALT 17 11/28/2017       CBC RESULTS:  Lab Results   Component Value Date    WBC 8.8 11/28/2017    RBC 5.24 11/28/2017 "    HGB 15.6 11/28/2017    HCT 47.8 11/28/2017    MCV 91 11/28/2017    MCH 29.8 11/28/2017    MCHC 32.6 11/28/2017    RDW 14.0 11/28/2017     11/28/2017       BMP RESULTS:  Lab Results   Component Value Date     11/28/2017    POTASSIUM 4.1 11/28/2017    CHLORIDE 101 11/28/2017    CO2 30 11/28/2017    ANIONGAP 6 11/28/2017     (H) 11/28/2017    BUN 16 11/28/2017    CR 0.86 11/28/2017    GFRESTIMATED >90 11/28/2017    GFRESTBLACK >90 11/28/2017    HAYLEY 9.1 11/28/2017        A1C RESULTS:  Lab Results   Component Value Date    A1C 5.0 08/02/2017       INR RESULTS:  No results found for: INR      Dung Montes MD, [facility]    CC  Alistair Jasmine Mai, MD  [address] [place] [address]

## 2018-04-10 DIAGNOSIS — I10 HYPERTENSION GOAL BP (BLOOD PRESSURE) < 140/90: ICD-10-CM

## 2018-04-10 RX ORDER — HYDROCHLOROTHIAZIDE 25 MG/1
TABLET ORAL
Qty: 90 TABLET | Refills: 1 | Status: SHIPPED | OUTPATIENT
Start: 2018-04-10 | End: 2019-01-02

## 2018-04-10 NOTE — TELEPHONE ENCOUNTER
"Requested Prescriptions   Pending Prescriptions Disp Refills     hydrochlorothiazide (HYDRODIURIL) 25 MG tablet [Pharmacy Med Name: HYDROCHLOROTHIAZIDE 25MG TABS] 90 tablet 1     Sig: TAKE ONE TABLET BY MOUTH EVERY DAY    Diuretics (Including Combos) Protocol Failed    4/10/2018 11:45 AM       Failed - Blood pressure under 140/90 in past 12 months    BP Readings from Last 3 Encounters:   12/11/17 142/88   12/04/17 132/80   08/07/17 131/79                Passed - Recent (12 mo) or future (30 days) visit within the authorizing provider's specialty    Patient had office visit in the last 12 months or has a visit in the next 30 days with authorizing provider or within the authorizing provider's specialty.  See \"Patient Info\" tab in inbasket, or \"Choose Columns\" in Meds & Orders section of the refill encounter.           Passed - Patient is age 18 or older       Passed - Normal serum creatinine on file in past 12 months    Recent Labs   Lab Test  11/28/17   1143   CR  0.86             Passed - Normal serum potassium on file in past 12 months    Recent Labs   Lab Test  11/28/17   1143   POTASSIUM  4.1                   Passed - Normal serum sodium on file in past 12 months    Recent Labs   Lab Test  11/28/17   1143   NA  137                Last Written Prescription Date:  7/19/17  Last Fill Quantity: 90,  # refills: 1   Last Office Visit with [facility], [facility] or [facility] prescribing provider:  **12/*4/17*   Future Office Visit:       "

## 2018-05-09 ENCOUNTER — OFFICE VISIT (OUTPATIENT)
Dept: FAMILY MEDICINE | Facility: OTHER | Age: 58
End: 2018-05-09
Payer: COMMERCIAL

## 2018-05-09 ENCOUNTER — RADIANT APPOINTMENT (OUTPATIENT)
Dept: GENERAL RADIOLOGY | Facility: OTHER | Age: 58
End: 2018-05-09
Attending: PHYSICIAN ASSISTANT
Payer: COMMERCIAL

## 2018-05-09 ENCOUNTER — NURSE TRIAGE (OUTPATIENT)
Dept: NURSING | Facility: CLINIC | Age: 58
End: 2018-05-09

## 2018-05-09 VITALS
BODY MASS INDEX: 35.22 KG/M2 | HEART RATE: 80 BPM | WEIGHT: 246 LBS | TEMPERATURE: 98.3 F | RESPIRATION RATE: 20 BRPM | SYSTOLIC BLOOD PRESSURE: 132 MMHG | DIASTOLIC BLOOD PRESSURE: 84 MMHG | HEIGHT: 70 IN

## 2018-05-09 DIAGNOSIS — S46.002A ROTATOR CUFF INJURY, LEFT, INITIAL ENCOUNTER: ICD-10-CM

## 2018-05-09 DIAGNOSIS — I10 HYPERTENSION GOAL BP (BLOOD PRESSURE) < 140/90: Primary | ICD-10-CM

## 2018-05-09 DIAGNOSIS — E66.01 MORBID OBESITY (H): ICD-10-CM

## 2018-05-09 DIAGNOSIS — M25.512 ACUTE PAIN OF LEFT SHOULDER: ICD-10-CM

## 2018-05-09 DIAGNOSIS — G62.1 ALCOHOLIC PERIPHERAL NEUROPATHY (H): ICD-10-CM

## 2018-05-09 PROCEDURE — 73030 X-RAY EXAM OF SHOULDER: CPT | Mod: LT

## 2018-05-09 PROCEDURE — 99214 OFFICE O/P EST MOD 30 MIN: CPT | Performed by: PHYSICIAN ASSISTANT

## 2018-05-09 ASSESSMENT — PAIN SCALES - GENERAL: PAINLEVEL: MODERATE PAIN (5)
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
## 2018-05-09 NOTE — MR AVS SNAPSHOT
After Visit Summary   5/9/2018    Jeevan Lopez    MRN: [id number]           Patient Information     Date Of Birth          1960        Visit Information        Provider Department      5/9/2018 9:40 AM Connor Bran PA-C [facility]        Today's Diagnoses     Hypertension goal BP (blood pressure) < 140/90    -  1    Acute pain of left shoulder        Rotator cuff injury, left, initial encounter        Alcoholic peripheral neuropathy (H)        Alcoholism /alcohol abuse (H)        Morbid obesity (H)          Care Instructions                                     Rotator Cuff Injury   What is a rotator cuff injury?   A rotator cuff injury is a strain or tear in the group of tendons and muscles that hold your shoulder joint together and help move your shoulder.   How does it occur?   A rotator cuff injury may result from:     using your arm to break a fall     falling onto your arm     lifting a heavy object     use of your shoulder in sports with a repetitive overhead movement, such as swimming, baseball (mainly pitchers), football, and tennis, which gradually strains the tendon     manual labor such as painting, plastering, raking leaves, or housework   What are the symptoms?   The symptoms of a torn rotator cuff are:     arm and shoulder pain     shoulder weakness     shoulder tenderness     loss of shoulder movement, especially overhead   How is it diagnosed?   Your healthcare provider will examine you and check your shoulder for pain, tenderness, and loss of motion as you move your arm in all directions. Your provider will ask if your shoulder pain began suddenly or gradually. You may have an X-ray to make sure there are not any fractures or bone spurs.   Based on these results, you may have other tests or procedures right away or later, such as:     magnetic resonance imaging (MRI), which creates images of your shoulder and surrounding structures with sound waves     an  arthrogram, which is an X-ray or MRI that is taken after a special dye has been injected into your shoulder joint to outline its soft structures     arthroscopy, a surgical procedure in which a small instrument is inserted into your shoulder joint so your provider can look directly at your rotator cuff   What is the treatment?   A tendon in your shoulder can be inflamed, partially torn, or completely torn. What is done about it depends on how torn it is and how much it hurts.   If your tear is a minor one, it can be left to heal by itself if it does not interfere with your everyday activities. Your treatment plan should include:     proper sitting posture, in which your head and shoulders are balanced     rest for your shoulder, which means avoiding strenuous activity or any overhead motion that causes pain     ice packs at least once a day, and preferably 2 or 3 times a day     doing the exercises your healthcare provider gives you     anti-inflammatory drugs. Adults aged 65 years and older should not take non-steroidal anti-inflammatory medicine for more than 7 days without their healthcare provider's approval.     physical therapy to strengthen your shoulder as it heals   If you have a bad tear, you may need to have it repaired by arthroscopy. Arthroscopy can be used to perform surgery on a joint as well as to see inside the joint. The rough edges of a torn tendon can be trimmed and left to heal. Larger tears can be stitched back together. After surgery, your treatment plan will include physical therapy to strengthen your shoulder as it heals.   How long will the effects last?   Full recovery depends on what is torn and how it is treated.   When can I return to my normal activities?   Everyone recovers from an injury at a different rate. Return to your activities will be determined by how soon your shoulder recovers, not by how many days or weeks it has been since your injury has occurred. In general, the longer  you have symptoms before you start treatment, the longer it will take to get better. The goal of rehabilitation is to return you to your normal activities as soon as is safely possible. If you return too soon you may worsen your injury.   You may safely return to your normal activities when:     Your injured shoulder has full range of motion without pain.     Your injured shoulder has regained normal strength compared to the uninjured shoulder.   What can be done to help prevent this from recurring?   The best way to prevent a recurrence is to strengthen your shoulder muscles and keep them in peak condition with shoulder exercises.           Rotator Cuff Strain Rehabilitation Exercises                You may do all of these exercises right away.   Isometric shoulder external rotation:  a doorway with your elbow bent 90 degrees and the back of the wrist on your injured side pressed against the door frame. Try to press your hand outward into the door frame. Hold for 5 seconds. Do 3 sets of 10.   Isometric shoulder internal rotation:  a doorway with your elbow bent 90 degrees and the front of the wrist on your injured side pressed against the door frame. Try to press your palm into the door frame. Hold for 5 seconds. Do 3 sets of 10.   Wand exercise: Flexion: Stand upright and hold a stick in both hands, palms down. Stretch your arms by lifting them over your head, keeping your arms straight. Hold for 5 seconds and return to the starting position. Repeat 10 times.   Wand exercise: Extension: Stand upright and hold a stick in both hands behind your back. Move the stick away from your back. Hold the end position for 5 seconds. Relax and return to the starting position. Repeat 10 times.   Wand exercise: External rotation: Lie on your back and hold a stick in both hands, palms up. Your upper arms should be resting on the floor with your elbows at your sides and bent 90 degrees. Use your uninjured arm to  push your injured arm out away from your body. Keep the elbow of your injured arm at your side while it is being pushed. Hold the stretch for 5 seconds. Repeat 10 times.   Wand exercise: Shoulder abduction and adduction: Stand and hold a stick with both hands, palms facing away from your body. Rest the stick against the front of your thighs. Use your uninjured arm to push your injured arm out to the side and up as high as possible. Keep your arms straight. Hold for 5 seconds. Repeat 10 times.   Resisted shoulder external rotation: Stand sideways next to a door with your injured arm farther from the door. Tie a knot in the end of the tubing and shut the knot in the door at waist level. Hold the other end of the tubing with the hand of your injured arm. Rest the hand of your injured arm across your stomach. Keeping your elbow in at your side, rotate your arm outward and away from your waist. Make sure you keep your elbow bent 90 degrees and your forearm parallel to the floor. Repeat 10 times. Build up to 3 sets of 10.   Resisted shoulder internal rotation: Stand sideways next to a door with your injured arm closest to the door. Tie a knot in the end of the tubing and shut the knot in the door at waist level. Hold the other end of the tubing with the hand of your injured arm. Bend the elbow of your injured arm 90 degrees. Keeping your elbow in at your side, rotate your forearm across your body and then back to the starting position. Make sure you keep your forearm parallel to the floor. Do 3 sets of 10.   Scaption: Stand with your arms at your sides and with your elbows straight. Slowly raise your arms to eye level. As you raise your arms, spread them apart so that they are only slightly in front of your body (at about a 30-degree angle to the front of your body). Point your thumbs toward the ceiling. Hold for 2 seconds and lower your arms slowly. Do 3 sets of 10. Progress to holding a soup can or light weight when you  "are doing the exercise and increase the weight as the exercise gets easier.   Side-lying external rotation: Lie on your uninjured side with your injured arm at your side and your elbow bent 90 degrees. Keeping your elbow against your side, raise your forearm toward the ceiling and hold for 2 seconds. Slowly lower your arm. Do 3 sets of 10. You can start doing this exercise holding a soup can or light weight and gradually increase the weight as long as there is no pain.   Horizontal abduction: Lie on your stomach on a table or the edge of a bed with the arm on your injured side hanging down over the edge. Raise your arm out to the side, with your thumb pointed toward the ceiling, until your arm is parallel to the floor. Hold for 2 seconds and then lower it slowly. Start this exercise with no weight. As you get stronger, add a light weight or hold a soup can. Do 3 sets of 10.   Push-up with a plus: Begin on the floor on your hands and knees. Keep your arms a shoulder width apart and lift your feet off the floor. Arch your back as high as possible and round your shoulders (this is the \"plus\" part or the exercise). Bend your elbows and lower your body to the floor. Return to the starting position and arch your back again. Do 3 sets of 10.   Published by [employer].  This content is reviewed periodically and is subject to change as new health information becomes available. The information is intended to inform and educate and is not a replacement for medical evaluation, advice, diagnosis or treatment by a healthcare professional.   Written by Silvia Gibson, MS, PT, and Marysol Coffman PT, [facility], [facility], for [employer].   ? 2010 [employer] and/or its affiliates. All Rights Reserved.   Copyright   [employer] 2011  Adult Health Advisor                          Follow-ups after your visit        Additional Services     PHYSICAL THERAPY REFERRAL       *This therapy referral will be filtered to a centralized " "scheduling office at [facility] and the patient will receive a call to schedule an appointment at a [place] location most convenient for them. *     [facility] provides Physical Therapy evaluation and treatment and many specialty services across the [place] system.  If requesting a specialty program, please choose from the list below.    If you have not heard from the scheduling office within 2 business days, please call [phone] for all locations, with the exception of [place], please call [phone] and [facility], please call [phone]  Treatment: Evaluation & Treatment  Special Instructions/Modalities: eval and treat left should pain as best we can related to concerns around rotator cuff injury.  Special Programs:     Please be aware that coverage of these services is subject to the terms and limitations of your health insurance plan.  Call member services at your health plan with any benefit or coverage questions.      **Note to Provider:  If you are referring outside of [place] for the therapy appointment, please list the name of the location in the \"special instructions\" above, print the referral and give to the patient to schedule the appointment.                  Your next 10 appointments already scheduled     May 10, 2018  1:00 PM CDT   MR SHOULDER LEFT W/O CONTRAST with PHMR2   [facility] ([facility])    [address] [place] [id number] [phone]           Take your medicines as usual, unless your doctor tells you not to. Bring a list of your current medicines to your exam (including vitamins, minerals and over-the-counter drugs). Also bring the results of similar scans you may have had.  Please remove any body piercings and hair extensions before you arrive.  Follow your doctor s orders. If you do not, we may have to postpone your exam.  You may or may not receive IV contrast for this exam pending the " discretion of the Radiologist.  You do not need to do anything special to prepare.  The MRI machine uses a strong magnet. Please wear clothes without metal (snaps, zippers). A sweatsuit works well, or we may give you a hospital gown.   **IMPORTANT** THE INSTRUCTIONS BELOW ARE ONLY FOR THOSE PATIENTS WHO HAVE BEEN PRESCRIBED SEDATION OR GENERAL ANESTHESIA DURING THEIR MRI PROCEDURE:  IF YOUR DOCTOR PRESCRIBED ORAL SEDATION (take medicine to help you relax during your exam):   You must get the medicine from your doctor (oral medication) before you arrive. Bring the medicine to the exam. Do not take it at home. You ll be told when to take it upon arriving for your exam.   Arrive one hour early. Bring someone who can take you home after the test. Your medicine will make you sleepy. After the exam, you may not drive, take a bus or take a taxi by yourself.  IF YOUR DOCTOR PRESCRIBED IV SEDATION:   Arrive one hour early. Bring someone who can take you home after the test. Your medicine will make you sleepy. After the exam, you may not drive, take a bus or take a taxi by yourself.   No eating 6 hours before your exam. You may have clear liquids up until 4 hours before your exam. (Clear liquids include water, clear tea, black coffee and fruit juice without pulp.)  IF YOUR DOCTOR PRESCRIBED ANESTHESIA (be asleep for your exam):   Arrive 1 1/2 hours early. Bring someone who can take you home after the test. You may not drive, take a bus or take a taxi by yourself.   No eating 8 hours before your exam. You may have clear liquids up until 4 hours before your exam. (Clear liquids include water, clear tea, black coffee and fruit juice without pulp.)   You will spend four to five hours in the recovery room.  Please call the Imaging Department at your exam site with any questions.              Future tests that were ordered for you today     Open Future Orders        Priority Expected Expires Ordered    MR Shoulder Left w/o Contrast  "Routine  2019            Who to contact     If you have questions or need follow up information about today's clinic visit or your schedule please contact [facility] directly at [phone].  Normal or non-critical lab and imaging results will be communicated to you by MyChart, letter or phone within 4 business days after the clinic has received the results. If you do not hear from us within 7 days, please contact the clinic through [email or web address] or phone. If you have a critical or abnormal lab result, we will notify you by phone as soon as possible.  Submit refill requests through [employer] or call your pharmacy and they will forward the refill request to us. Please allow 3 business days for your refill to be completed.          Additional Information About Your Visit        [email or web address] Information     [employer] lets you send messages to your doctor, view your test results, renew your prescriptions, schedule appointments and more. To sign up, go to [email or web address] . Click on \"Log in\" on the left side of the screen, which will take you to the Welcome page. Then click on \"Sign up Now\" on the right side of the page.     You will be asked to enter the access code listed below, as well as some personal information. Please follow the directions to create your username and password.     Your access code is: [email or web address]  Expires: 2018 10:31 AM     Your access code will  in 90 days. If you need help or a new code, please call your [facility] or [phone].        Care EveryWhere ID     This is your Care EveryWhere ID. This could be used by other organizations to access your [facility] medical records  [id number]        Your Vitals Were     Pulse Temperature Respirations Height BMI (Body Mass Index)       80 98.3  F (36.8  C) (Temporal) 20 5' 9.5\" (1.765 m) 35.81 kg/m2        Blood Pressure from Last 3 Encounters:   18 132/84   17 142/88   17 132/80    Weight from " Last 3 Encounters:   05/09/18 246 lb (111.6 kg)   12/11/17 249 lb 3.2 oz (113 kg)   12/04/17 251 lb 9.6 oz (114.1 kg)              We Performed the Following     PHYSICAL THERAPY REFERRAL        Primary Care Provider Office Phone # Fax #    Alistair Jasmine Mai, [phone] [address] [place] [address]        Equal Access to Services     TERE MACKAY : Hadii aad ku hadasho Soomaali, waaxda luqadaha, qaybta kaalmada adeegyada, waxay idiin hayaan adeeg geniash lakrishna . So [facility] [phone].    ATENCIÓN: Si ruthann mckeon, tiene a antoine disposición servicios gratuitos de asistencia lingüística. Llame al [phone].    We comply with applicable federal civil rights laws and [place] laws. We do not discriminate on the basis of race, color, national origin, age, disability, sex, sexual orientation, or gender identity.            Thank you!     Thank you for choosing [facility]  for your care. Our goal is always to provide you with excellent care. Hearing back from our patients is one way we can continue to improve our services. Please take a few minutes to complete the written survey that you may receive in the mail after your visit with us. Thank you!             Your Updated Medication List - Protect others around you: Learn how to safely use, store and throw away your medicines at www.disposemymeds.org.          This list is accurate as of 5/9/18 10:31 AM.  Always use your most recent med list.                   Brand Name Dispense Instructions for use Diagnosis    amLODIPine 5 MG tablet    NORVASC    180 tablet    Take 1 tablet (5 mg) by mouth 2 times daily    Hypertension goal BP (blood pressure) < 140/90       ASPIRIN LOW DOSE 81 MG tablet   Generic drug:  aspirin      Take 1 tablet by mouth daily.        B-12 PO           FISH OIL CONCENTRATE PO           fluticasone 50 MCG/ACT spray    FLONASE    16 g    USE 2 SPRAYS IN EACH NOSTRIL ONCE DAILY    Chronic rhinitis, unspecified  type       GARLIC-X PO           GLUCOSAMINE CHONDR COMPLEX PO      Take  by mouth.        hydrochlorothiazide 25 MG tablet    HYDRODIURIL    90 tablet    TAKE ONE TABLET BY MOUTH EVERY DAY    Hypertension goal BP (blood pressure) < 140/90       MAGNESIUM CITRATE PO           nitroGLYcerin 0.4 MG sublingual tablet    NITROSTAT    25 tablet    For chest pain place 1 tablet under the tongue every 5 minutes for 3 doses. If symptoms persist 5 minutes after 1st dose call 911.    Atypical chest pain       omeprazole 20 MG CR capsule    priLOSEC    90 capsule    Take 1 capsule (20 mg) by mouth daily    Gastroesophageal reflux disease, esophagitis presence not specified       order for DME      Equipment ordered: [employer] Auto PAP Mask type: Full face  Settings: 5-15 cm H2O        SAW PALMETTO CONCENTRATE OR           VITAMIN B-1 PO

## 2018-05-09 NOTE — PATIENT INSTRUCTIONS
Rotator Cuff Injury   What is a rotator cuff injury?   A rotator cuff injury is a strain or tear in the group of tendons and muscles that hold your shoulder joint together and help move your shoulder.   How does it occur?   A rotator cuff injury may result from:     using your arm to break a fall     falling onto your arm     lifting a heavy object     use of your shoulder in sports with a repetitive overhead movement, such as swimming, baseball (mainly pitchers), football, and tennis, which gradually strains the tendon     manual labor such as painting, plastering, raking leaves, or housework   What are the symptoms?   The symptoms of a torn rotator cuff are:     arm and shoulder pain     shoulder weakness     shoulder tenderness     loss of shoulder movement, especially overhead   How is it diagnosed?   Your healthcare provider will examine you and check your shoulder for pain, tenderness, and loss of motion as you move your arm in all directions. Your provider will ask if your shoulder pain began suddenly or gradually. You may have an X-ray to make sure there are not any fractures or bone spurs.   Based on these results, you may have other tests or procedures right away or later, such as:     magnetic resonance imaging (MRI), which creates images of your shoulder and surrounding structures with sound waves     an arthrogram, which is an X-ray or MRI that is taken after a special dye has been injected into your shoulder joint to outline its soft structures     arthroscopy, a surgical procedure in which a small instrument is inserted into your shoulder joint so your provider can look directly at your rotator cuff   What is the treatment?   A tendon in your shoulder can be inflamed, partially torn, or completely torn. What is done about it depends on how torn it is and how much it hurts.   If your tear is a minor one, it can be left to heal by itself if it does not interfere with  your everyday activities. Your treatment plan should include:     proper sitting posture, in which your head and shoulders are balanced     rest for your shoulder, which means avoiding strenuous activity or any overhead motion that causes pain     ice packs at least once a day, and preferably 2 or 3 times a day     doing the exercises your healthcare provider gives you     anti-inflammatory drugs. Adults aged 65 years and older should not take non-steroidal anti-inflammatory medicine for more than 7 days without their healthcare provider's approval.     physical therapy to strengthen your shoulder as it heals   If you have a bad tear, you may need to have it repaired by arthroscopy. Arthroscopy can be used to perform surgery on a joint as well as to see inside the joint. The rough edges of a torn tendon can be trimmed and left to heal. Larger tears can be stitched back together. After surgery, your treatment plan will include physical therapy to strengthen your shoulder as it heals.   How long will the effects last?   Full recovery depends on what is torn and how it is treated.   When can I return to my normal activities?   Everyone recovers from an injury at a different rate. Return to your activities will be determined by how soon your shoulder recovers, not by how many days or weeks it has been since your injury has occurred. In general, the longer you have symptoms before you start treatment, the longer it will take to get better. The goal of rehabilitation is to return you to your normal activities as soon as is safely possible. If you return too soon you may worsen your injury.   You may safely return to your normal activities when:     Your injured shoulder has full range of motion without pain.     Your injured shoulder has regained normal strength compared to the uninjured shoulder.   What can be done to help prevent this from recurring?   The best way to prevent a recurrence is to strengthen your shoulder  muscles and keep them in peak condition with shoulder exercises.           Rotator Cuff Strain Rehabilitation Exercises                You may do all of these exercises right away.   Isometric shoulder external rotation:  a doorway with your elbow bent 90 degrees and the back of the wrist on your injured side pressed against the door frame. Try to press your hand outward into the door frame. Hold for 5 seconds. Do 3 sets of 10.   Isometric shoulder internal rotation:  a doorway with your elbow bent 90 degrees and the front of the wrist on your injured side pressed against the door frame. Try to press your palm into the door frame. Hold for 5 seconds. Do 3 sets of 10.   Wand exercise: Flexion: Stand upright and hold a stick in both hands, palms down. Stretch your arms by lifting them over your head, keeping your arms straight. Hold for 5 seconds and return to the starting position. Repeat 10 times.   Wand exercise: Extension: Stand upright and hold a stick in both hands behind your back. Move the stick away from your back. Hold the end position for 5 seconds. Relax and return to the starting position. Repeat 10 times.   Wand exercise: External rotation: Lie on your back and hold a stick in both hands, palms up. Your upper arms should be resting on the floor with your elbows at your sides and bent 90 degrees. Use your uninjured arm to push your injured arm out away from your body. Keep the elbow of your injured arm at your side while it is being pushed. Hold the stretch for 5 seconds. Repeat 10 times.   Wand exercise: Shoulder abduction and adduction: Stand and hold a stick with both hands, palms facing away from your body. Rest the stick against the front of your thighs. Use your uninjured arm to push your injured arm out to the side and up as high as possible. Keep your arms straight. Hold for 5 seconds. Repeat 10 times.   Resisted shoulder external rotation: Stand sideways next to a door with your  injured arm farther from the door. Tie a knot in the end of the tubing and shut the knot in the door at waist level. Hold the other end of the tubing with the hand of your injured arm. Rest the hand of your injured arm across your stomach. Keeping your elbow in at your side, rotate your arm outward and away from your waist. Make sure you keep your elbow bent 90 degrees and your forearm parallel to the floor. Repeat 10 times. Build up to 3 sets of 10.   Resisted shoulder internal rotation: Stand sideways next to a door with your injured arm closest to the door. Tie a knot in the end of the tubing and shut the knot in the door at waist level. Hold the other end of the tubing with the hand of your injured arm. Bend the elbow of your injured arm 90 degrees. Keeping your elbow in at your side, rotate your forearm across your body and then back to the starting position. Make sure you keep your forearm parallel to the floor. Do 3 sets of 10.   Scaption: Stand with your arms at your sides and with your elbows straight. Slowly raise your arms to eye level. As you raise your arms, spread them apart so that they are only slightly in front of your body (at about a 30-degree angle to the front of your body). Point your thumbs toward the ceiling. Hold for 2 seconds and lower your arms slowly. Do 3 sets of 10. Progress to holding a soup can or light weight when you are doing the exercise and increase the weight as the exercise gets easier.   Side-lying external rotation: Lie on your uninjured side with your injured arm at your side and your elbow bent 90 degrees. Keeping your elbow against your side, raise your forearm toward the ceiling and hold for 2 seconds. Slowly lower your arm. Do 3 sets of 10. You can start doing this exercise holding a soup can or light weight and gradually increase the weight as long as there is no pain.   Horizontal abduction: Lie on your stomach on a table or the edge of a bed with the arm on your  "injured side hanging down over the edge. Raise your arm out to the side, with your thumb pointed toward the ceiling, until your arm is parallel to the floor. Hold for 2 seconds and then lower it slowly. Start this exercise with no weight. As you get stronger, add a light weight or hold a soup can. Do 3 sets of 10.   Push-up with a plus: Begin on the floor on your hands and knees. Keep your arms a shoulder width apart and lift your feet off the floor. Arch your back as high as possible and round your shoulders (this is the \"plus\" part or the exercise). Bend your elbows and lower your body to the floor. Return to the starting position and arch your back again. Do 3 sets of 10.   Published by [employer].  This content is reviewed periodically and is subject to change as new health information becomes available. The information is intended to inform and educate and is not a replacement for medical evaluation, advice, diagnosis or treatment by a healthcare professional.   Written by Silvia Gibson MS, PT, and Marysol Coffman PT, [facility], [facility], for [employer].   ? 2010 [facility] and/or its affiliates. All Rights Reserved.   Copyright   [employer] 2011  [facility]ult [employer] Advisor                  "
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
## 2018-05-10 ENCOUNTER — TELEPHONE (OUTPATIENT)
Dept: FAMILY MEDICINE | Facility: OTHER | Age: 58
End: 2018-05-10

## 2018-05-10 ENCOUNTER — TELEPHONE (OUTPATIENT)
Dept: OTHER | Facility: CLINIC | Age: 58
End: 2018-05-10

## 2018-05-10 ENCOUNTER — HOSPITAL ENCOUNTER (OUTPATIENT)
Dept: MRI IMAGING | Facility: CLINIC | Age: 58
Discharge: HOME | End: 2018-05-10
Attending: PHYSICIAN ASSISTANT | Admitting: PHYSICIAN ASSISTANT
Payer: COMMERCIAL

## 2018-05-10 DIAGNOSIS — S46.002A ROTATOR CUFF INJURY, LEFT, INITIAL ENCOUNTER: ICD-10-CM

## 2018-05-10 DIAGNOSIS — M25.512 ACUTE PAIN OF LEFT SHOULDER: ICD-10-CM

## 2018-05-10 PROCEDURE — 73221 MRI JOINT UPR EXTREM W/O DYE: CPT | Mod: LT

## 2018-05-10 NOTE — TELEPHONE ENCOUNTER
Patient is going to check with his insurance to make sure they will cover surgery and who they will cover for him to see.  Thank  you

## 2018-05-10 NOTE — TELEPHONE ENCOUNTER
Left message asking patient to return our call.  Please inform him of the message below.  Dieudonne Ny CMA    Please advise the patient that his shoulder has a significant rotator cuff tear that will need surgical intervention.  Please have him make an appointment with orthopedic specialist that we discussed when he was here in the clinic.  I will forward a copy of this to the orthopedic specialist.  Electronically signed:    Connor Stoddard PA-C
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
## 2018-05-16 ENCOUNTER — TELEPHONE (OUTPATIENT)
Dept: ORTHOPEDICS | Facility: CLINIC | Age: 58
End: 2018-05-16

## 2018-05-16 ENCOUNTER — OFFICE VISIT (OUTPATIENT)
Dept: ORTHOPEDICS | Facility: OTHER | Age: 58
End: 2018-05-16
Payer: COMMERCIAL

## 2018-05-16 VITALS
DIASTOLIC BLOOD PRESSURE: 80 MMHG | HEIGHT: 70 IN | WEIGHT: 246 LBS | SYSTOLIC BLOOD PRESSURE: 140 MMHG | BODY MASS INDEX: 35.22 KG/M2

## 2018-05-16 DIAGNOSIS — M75.122 COMPLETE TEAR OF LEFT ROTATOR CUFF: Primary | ICD-10-CM

## 2018-05-16 PROCEDURE — 99244 OFF/OP CNSLTJ NEW/EST MOD 40: CPT | Performed by: ORTHOPAEDIC SURGERY

## 2018-05-16 ASSESSMENT — PAIN SCALES - GENERAL: PAINLEVEL: MILD PAIN (2)
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
## 2018-05-16 NOTE — PATIENT INSTRUCTIONS
Encounter Diagnosis   Name Primary?     Complete tear of left rotator cuff Yes     Rest, ice and elevate above heart level as needed for pain control  1.  We reviewed your MRI which shows that you have a medium size rotator cuff tear on the top tendon which is your supraspinatus.  This is why you cannot lift her arm all that well.  2.  X-ray: We did review your x-rays that show slight arthritis in the form of bone spurs at your AC joint in at the shoulder joint itself.  3.  You can hold on formal physical therapy at this point unless you want to go.  4.  If you do activity with the arm keep your elbow glued to her side.  5.  We discussed surgery and why we do this.  I have information below.  The tendon will not heal itself so we need to bring it to where it attaches and hold it there with suture anchor.  Surgery Information:   1. Today we discussed surgery, the risks, benefits and alternatives.  Risks are infection, bleeding or nerve issues, anesthesia problems. All of these are very rare.  2. The surgery would be a left shoulder arthroscopic rotator cuff repair, acromioplasty.  This means we will repair the rotator cuff with anchors and suture and remove the bone spurs also.  We do this with small poke holes in the skin and use a camera scope to see in the shoulder.   3. This surgery is a same day surgery, so you will go home the same day. Plan to have some one drive you home.  4. You will need a Pre Operative History and Physical from Alistair Jasmine Mai, MD or another provider prior to surgery. This needs to be within 30 days prior to surgery. We can help you set this up.  5. We keep your arm quiet for 10 weeks after surgery; after 10 weeks we will get you out of the sling and get the shoulder moving.  6.  It takes about 6 months for a full recovery for this surgery.    7.  Follow up with Rakel Nowak MD on the day of surgery. We will talk to you prior to surgery and answer any questions, but it is ok to call  prior to surgery if you any questions he wanted answered sooner.      [employer] and [employer] may offer reliable information regarding your diagnosis and treatment plan.    THANK YOU for coming in today. If you receive a survey via [employer] or mail please let us know if there was anything you especially appreciated today or if there is any way we can improve our clinic. We appreciate your input.    GENERAL INFORMATION:  Our hours are:  Monday :     Clinic 7:30 AM-430 PM ([facility])  Tuesday:      Operating Room All Day ([facility])  Wednesday: Clinic 7:30 AM - 11:15 AM ([facility])             Clinic 1:00 PM - 4:00PM ([facility])  Thursday:     Administrative Day  Friday:          Clinic 7:30 AM - 11:15 AM ([facility])            Clinic 1:00 PM - 4:00 PM ([facility])    [facility] for any issues or concerns: [phone]     We are not in the office Thursdays. Therefore non- urgent calls and medical messages received on Thursday will be addressed when we are back in the office on Wednesday. Urgent matters will be reviewed and addressed by one of our partners in the office as needed.    If lab work was done today as part of your evaluation you will generally be contacted via [employer], mail, or phone with the results within 1-5 days. If there is an alarming result we will contact you by phone. Lab results come back at varying times, I generally wait until all labs are resulted before making comments on results. Please note labs are automatically released to [employer] (if you have signed up for it) once available-at times you may see these prior to my having a chance to review them as well.    If you need refills please contact your pharmacist. They will send a refill request to me to review. Please allow 3 business days for us to process all refill requests. All narcotic refills should be handled in  the clinic at the time of your visit.    Shoulder Arthroscopy: Conditions Treated  You will be given instructions for how to prepare for your surgery and when you should arrive at the hospital or surgery center. Just before surgery, a doctor will talk to you about the anesthesia that will be used to keep you free of pain during surgery. You may be asked by several people to confirm which shoulder is being operated on. This is for your safety. Below are common shoulder problems and how they are treated during arthroscopy.        Impingement    Repeated overhead movements can inflame your rotator cuff and bursa. A bone spur may also form. This causes pain and problems with certain arm movements. Impingement is also called bursitis or tendinitis.    During surgery, an inflamed bursa may be removed. Bone may be trimmed. And the coracoacromial ligament may be detached. These steps make more room, relieving pressure and allowing the arm to move more freely.    Torn rotator cuff    A rotator cuff can tear due to a sudden injury or from overuse. This can cause pain, weakness, and loss of normal shoulder movement.    During surgery, torn rotator cuff tendons may be trimmed. The tendons are then reattached to the humerus. This is done with stitches, anchors, or surgical tacks.

## 2018-05-16 NOTE — MR AVS SNAPSHOT
After Visit Summary   5/16/2018    Jeevan Lopez    MRN: [id number]           Patient Information     Date Of Birth          1960        Visit Information        Provider Department      5/16/2018 9:10 AM Rakel Nowak MD [facility]        Today's Diagnoses     Complete tear of left rotator cuff    -  1      Care Instructions       Encounter Diagnosis   Name Primary?     Complete tear of left rotator cuff Yes     Rest, ice and elevate above heart level as needed for pain control  1.  We reviewed your MRI which shows that you have a medium size rotator cuff tear on the top tendon which is your supraspinatus.  This is why you cannot lift her arm all that well.  2.  X-ray: We did review your x-rays that show slight arthritis in the form of bone spurs at your AC joint in at the shoulder joint itself.  3.  You can hold on formal physical therapy at this point unless you want to go.  4.  If you do activity with the arm keep your elbow glued to her side.  5.  We discussed surgery and why we do this.  I have information below.  The tendon will not heal itself so we need to bring it to where it attaches and hold it there with suture anchor.  Surgery Information:   1. Today we discussed surgery, the risks, benefits and alternatives.  Risks are infection, bleeding or nerve issues, anesthesia problems. All of these are very rare.  2. The surgery would be a left shoulder arthroscopic rotator cuff repair, acromioplasty.  This means we will repair the rotator cuff with anchors and suture and remove the bone spurs also.  We do this with small poke holes in the skin and use a camera scope to see in the shoulder.   3. This surgery is a same day surgery, so you will go home the same day. Plan to have some one drive you home.  4. You will need a Pre Operative History and Physical from Alistair Jasmine Mai, MD or another provider prior to surgery. This needs to be within 30 days prior to surgery. We can  help you set this up.  5. We keep your arm quiet for 10 weeks after surgery; after 10 weeks we will get you out of the sling and get the shoulder moving.  6.  It takes about 6 months for a full recovery for this surgery.    7.  Follow up with Rakel Nowak MD on the day of surgery. We will talk to you prior to surgery and answer any questions, but it is ok to call prior to surgery if you any questions he wanted answered sooner.      [address] and [facility] may offer reliable information regarding your diagnosis and treatment plan.    THANK YOU for coming in today. If you receive a survey via Feniks or mail please let us know if there was anything you especially appreciated today or if there is any way we can improve our clinic. We appreciate your input.    GENERAL INFORMATION:  Our hours are:  Monday :     [facility]  Tuesday:      Operating Room All Day ([facility] [place])  Wednesday: Clinic 7:30 AM - 11:15 AM ([facility])             Clinic 1:00 PM - 4:00PM [facility]  Thursday:     Administrative Day  Friday:          Clinic 7:30 AM - 11:[facility] AM [facility]            Clinic 1:00 PM - 4:00 PM ([facility])    [facility] for any issues or concerns: [phone]     We are not in the office Thursdays. Therefore non- urgent calls and medical messages received on Thursday will be addressed when we are back in the office on Wednesday. Urgent matters will be reviewed and addressed by one of our partners in the office as needed.    If lab work was done today as part of your evaluation you will generally be contacted via Feniks, mail, or phone with the results within 1-5 days. If there is an alarming result we will contact you by phone. Lab results come back at varying times, I generally wait until all labs are resulted before making comments on results. Please note labs are  automatically released to [employer] (if you have signed up for it) once available-at times you may see these prior to my having a chance to review them as well.    If you need refills please contact your pharmacist. They will send a refill request to me to review. Please allow 3 business days for us to process all refill requests. All narcotic refills should be handled in the clinic at the time of your visit.    Shoulder Arthroscopy: Conditions Treated  You will be given instructions for how to prepare for your surgery and when you should arrive at the hospital or surgery center. Just before surgery, a doctor will talk to you about the anesthesia that will be used to keep you free of pain during surgery. You may be asked by several people to confirm which shoulder is being operated on. This is for your safety. Below are common shoulder problems and how they are treated during arthroscopy.        Impingement    Repeated overhead movements can inflame your rotator cuff and bursa. A bone spur may also form. This causes pain and problems with certain arm movements. Impingement is also called bursitis or tendinitis.    During surgery, an inflamed bursa may be removed. Bone may be trimmed. And the coracoacromial ligament may be detached. These steps make more room, relieving pressure and allowing the arm to move more freely.    Torn rotator cuff    A rotator cuff can tear due to a sudden injury or from overuse. This can cause pain, weakness, and loss of normal shoulder movement.    During surgery, torn rotator cuff tendons may be trimmed. The tendons are then reattached to the humerus. This is done with stitches, anchors, or surgical tacks.            Follow-ups after your visit        Follow-up notes from your care team     Return in about 1 week (around 5/23/2018) for surgery.      Who to contact     If you have questions or need follow up information about today's clinic visit or your schedule please contact [facility] directly at [phone].  Normal or non-critical lab and imaging results will be communicated to you by MyChart, letter or phone within 4 business days after the clinic has received the results. If you do not hear from us within 7 days, please contact the clinic through [employer]book[employer] or phone. If you have a critical or abnormal lab result, we will notify you by phone as soon as possible.  Submit refill requests through [employer]VR or call your pharmacy and they will forward the refill request to us. Please allow 3 business days for your refill to be completed.          Additional Information About Your Visit        [employer] Information     [employer]VR lets you send messages to your doctor, view your test results, renew your prescriptions, schedule appointments and more. To sign up, go to [email or web address] . Click on \"Log in\" on the left side of the screen, which will take you to the Welcome page. Then click on \"Sign up Now\" on the right side of the page.     You will be asked to enter the access code listed below, as well as some personal information. Please follow the directions to create your username and password.     Your access code is: [email or web address]  Expires: 2018 10:31 AM     Your access code will  in 90 days. If you need help or a new code, please call your [facility] clinic or [phone].        Care EveryWhere ID     This is your Care EveryWhere ID. This could be used by other organizations to access your [facility] medical records  [id number]        Your Vitals Were     Height BMI (Body Mass Index)                1.765 m (5' 9.5\") 35.81 kg/m2           Blood Pressure from Last 3 Encounters:   18 140/80   18 132/84   17 142/88    Weight from Last 3 Encounters:   18 111.6 kg (246 lb)   18 111.6 kg (246 lb)   17 113 kg (249 lb 3.2 oz)              We Performed the Following     Yesi-Operative Worksheet        Primary Care Provider Office Phone # Fax #    " Alistair Jasmine Mai, [phone]       [address] [place] [address]        Equal Access to Services     TERE MACKAY : Malathi mayito gutierrez kristie Hawkins, travisda herminiomickha, karma kaross gordillo, dayanara ortizsadie dagoberto. So [facility] [phone].    ATENCIÓN: Si habla español, tiene a antoine disposición servicios gratuitos de asistencia lingüística. Llame al [phone].    We comply with applicable federal civil rights laws and [place] laws. We do not discriminate on the basis of race, color, national origin, age, disability, sex, sexual orientation, or gender identity.            Thank you!     Thank you for choosing [facility]  for your care. Our goal is always to provide you with excellent care. Hearing back from our patients is one way we can continue to improve our services. Please take a few minutes to complete the written survey that you may receive in the mail after your visit with us. Thank you!             Your Updated Medication List - Protect others around you: Learn how to safely use, store and throw away your medicines at www.disposemymeds.org.          This list is accurate as of 5/16/18 10:08 AM.  Always use your most recent med list.                   Brand Name Dispense Instructions for use Diagnosis    amLODIPine 5 MG tablet    NORVASC    180 tablet    Take 1 tablet (5 mg) by mouth 2 times daily    Hypertension goal BP (blood pressure) < 140/90       ASPIRIN LOW DOSE 81 MG tablet   Generic drug:  aspirin      Take 1 tablet by mouth daily.        B-12 PO           FISH OIL CONCENTRATE PO           fluticasone 50 MCG/ACT spray    FLONASE    16 g    USE 2 SPRAYS IN EACH NOSTRIL ONCE DAILY    Chronic rhinitis, unspecified type       GARLIC-X PO           GLUCOSAMINE CHONDR COMPLEX PO      Take  by mouth.        hydrochlorothiazide 25 MG tablet    HYDRODIURIL    90 tablet    TAKE ONE TABLET BY MOUTH EVERY DAY    Hypertension goal BP (blood pressure) < 140/90        MAGNESIUM CITRATE PO           nitroGLYcerin 0.4 MG sublingual tablet    NITROSTAT    25 tablet    For chest pain place 1 tablet under the tongue every 5 minutes for 3 doses. If symptoms persist 5 minutes after 1st dose call 911.    Atypical chest pain       omeprazole 20 MG CR capsule    priLOSEC    90 capsule    Take 1 capsule (20 mg) by mouth daily    Gastroesophageal reflux disease, esophagitis presence not specified       order for DME      Equipment ordered: RESMED Auto PAP Mask type: Full face  Settings: 5-15 cm H2O        SAW PALMETTO CONCENTRATE OR           VITAMIN B-1 PO

## 2018-05-16 NOTE — TELEPHONE ENCOUNTER
Called patient to schedule surgery. He is going to think about it more and make a plan since he lives alone.

## 2018-05-17 ENCOUNTER — HOSPITAL ENCOUNTER (OUTPATIENT)
Facility: CLINIC | Age: 58
End: 2018-05-17
Attending: ORTHOPAEDIC SURGERY | Admitting: ORTHOPAEDIC SURGERY
Payer: COMMERCIAL

## 2018-05-17 NOTE — TELEPHONE ENCOUNTER
Type of surgery: Left shoulder arthroscopic rotator cuff repair and acromioplasty  Location of surgery: [facility]   Date of surgery: 6/18/18  Surgeon: Dr. Nowak  Pre-Op Appt Date: 6/11/18  Post-Op Appt Date: 6/29/18   Packet sent out: Surgery packet mailed to patient's home address.   Pre-cert/Authorization completed: NA  Date: 5/17/2018    Michelle Cartwright  Surgery Scheduler

## 2018-06-05 NOTE — TELEPHONE ENCOUNTER
Patient called to cancel surgery. States that he can move his arm now and is not having any problems. Notified Billie in the OR.

## 2019-01-02 ENCOUNTER — TELEPHONE (OUTPATIENT)
Dept: FAMILY MEDICINE | Facility: CLINIC | Age: 59
End: 2019-01-02

## 2019-01-02 DIAGNOSIS — I10 HYPERTENSION GOAL BP (BLOOD PRESSURE) < 140/90: ICD-10-CM

## 2019-01-02 DIAGNOSIS — J31.0 CHRONIC RHINITIS: ICD-10-CM

## 2019-01-02 RX ORDER — FLUTICASONE PROPIONATE 50 MCG
SPRAY, SUSPENSION (ML) NASAL
Qty: 16 G | Refills: 10 | Status: SHIPPED | OUTPATIENT
Start: 2019-01-02 | End: 2019-08-15

## 2019-01-02 RX ORDER — HYDROCHLOROTHIAZIDE 25 MG/1
25 TABLET ORAL DAILY
Qty: 30 TABLET | Refills: 0 | Status: SHIPPED | OUTPATIENT
Start: 2019-01-02 | End: 2019-08-15

## 2019-01-03 DIAGNOSIS — I10 HYPERTENSION GOAL BP (BLOOD PRESSURE) < 140/90: ICD-10-CM

## 2019-01-07 RX ORDER — AMLODIPINE BESYLATE 5 MG/1
5 TABLET ORAL
Qty: 60 TABLET | Refills: 0 | Status: SHIPPED | OUTPATIENT
Start: 2019-01-07 | End: 2019-08-15

## 2019-01-07 NOTE — TELEPHONE ENCOUNTER
"Lesa refill given per RN protocol.   Please contact patient to have them schedule the following: office visit and labs   Due anytime for HTN follow up  Irena Watts RN, BSN      Norvasc   Last Written Prescription Date:  12/11/2017  Last Fill Quantity: 180,  # refills: 3   Last office visit: 5/9/2018 with prescribing provider:  5/9/2018   Future Office Visit:      Requested Prescriptions   Pending Prescriptions Disp Refills     amLODIPine (NORVASC) 5 MG tablet 180 tablet 3     Sig: Take 1 tablet (5 mg) by mouth 2 times daily    Calcium Channel Blockers Protocol  Failed - 1/4/2019 12:24 PM       Failed - Blood pressure under 140/90 in past 12 months    BP Readings from Last 3 Encounters:   05/16/18 140/80   05/09/18 132/84   12/11/17 142/88                Failed - Recent (12 mo) or future (30 days) visit within the authorizing provider's specialty    Patient had office visit in the last 12 months or has a visit in the next 30 days with authorizing provider or within the authorizing provider's specialty.  See \"Patient Info\" tab in inbasket, or \"Choose Columns\" in Meds & Orders section of the refill encounter.             Failed - Normal serum creatinine on file in past 12 months    Recent Labs   Lab Test 11/28/17  1143   CR 0.86            Passed - Medication is active on med list       Passed - Patient is age 18 or older        Irena Watts RN on 1/7/2019 at 8:10 AM    "

## 2019-01-22 ENCOUNTER — TELEPHONE (OUTPATIENT)
Dept: FAMILY MEDICINE | Facility: CLINIC | Age: 59
End: 2019-01-22

## 2019-01-22 NOTE — TELEPHONE ENCOUNTER
Reason for Call:  Medication or medication refill:    Do you use a [facility]?  Name of the pharmacy and phone number for the current request:  [facility] - [phone]    Name of the medication requested: JOB noyola    Other request: advised patient appointment is needed, he declines and wants a message sent .  Sx of congestion started yesterday.  States gets this every year.     Can we leave a detailed message on this number? YES    Phone number patient can be reached at: Home number on file [phone] (home)    Best Time: any time     Call taken on 1/22/2019 at 8:58 AM by Winnie Perez

## 2019-01-22 NOTE — TELEPHONE ENCOUNTER
I have not seen him for over a year.  If he he has a symptom for 1-1-day, unlikely antibiotic is needed.  Recommend over-the-counter medication for symptomatic treatment.  If not getting any better or he still has a concern, recommend to follow-up.  Thank you

## 2019-01-24 ENCOUNTER — OFFICE VISIT (OUTPATIENT)
Dept: FAMILY MEDICINE | Facility: CLINIC | Age: 59
End: 2019-01-24
Payer: COMMERCIAL

## 2019-01-24 VITALS
WEIGHT: 238.6 LBS | SYSTOLIC BLOOD PRESSURE: 136 MMHG | OXYGEN SATURATION: 96 % | HEART RATE: 88 BPM | TEMPERATURE: 98.7 F | RESPIRATION RATE: 20 BRPM | DIASTOLIC BLOOD PRESSURE: 78 MMHG | BODY MASS INDEX: 34.16 KG/M2 | HEIGHT: 70 IN

## 2019-01-24 DIAGNOSIS — J40 BRONCHITIS: Primary | ICD-10-CM

## 2019-01-24 PROCEDURE — 99213 OFFICE O/P EST LOW 20 MIN: CPT | Performed by: FAMILY MEDICINE

## 2019-01-24 RX ORDER — CODEINE PHOSPHATE AND GUAIFENESIN 10; 100 MG/5ML; MG/5ML
1-2 SOLUTION ORAL EVERY 6 HOURS PRN
Qty: 240 ML | Refills: 0 | Status: SHIPPED | OUTPATIENT
Start: 2019-01-24 | End: 2019-08-15

## 2019-01-24 RX ORDER — AZITHROMYCIN 250 MG/1
TABLET, FILM COATED ORAL
Qty: 6 TABLET | Refills: 0 | Status: SHIPPED | OUTPATIENT
Start: 2019-01-24 | End: 2019-08-15

## 2019-01-24 ASSESSMENT — MIFFLIN-ST. JEOR: SCORE: 1911.04

## 2019-01-24 ASSESSMENT — PAIN SCALES - GENERAL: PAINLEVEL: SEVERE PAIN (6)

## 2019-01-24 NOTE — PROGRESS NOTES
SUBJECTIVE:   Jeevan Lopez is a 58 year old male who presents to clinic today for the following health issues:      Acute Illness   Acute illness concerns: URI  Onset: x one week    Fever: no    Chills/Sweats: YES- sweats    Headache (location?): YES    Sinus Pressure:YES    Conjunctivitis:  no    Ear Pain: no    Rhinorrhea: YES    Congestion: YES    Sore Throat: no     Cough: YES-productive of yellow sputum    Wheeze: YES    Decreased Appetite: YES    Nausea: no    Vomiting: no    Diarrhea:  no    Dysuria/Freq.: no    Fatigue/Achiness: YES- tired hard to sleep due to cough    Sick/Strep Exposure: no     Therapies Tried and outcome: over the counter cold and sinus          Problem list and histories reviewed & adjusted, as indicated.  Additional history: as documented        Reviewed and updated as needed this visit by clinical staff       Reviewed and updated as needed this visit by Provider        SUBJECTIVE:  Jeevan  is a 58 year old male who presents for: Symptoms as noted above.  Been getting worse.  Tight in the chest.    Past Medical History:   Diagnosis Date     Adenomatous polyp of colon 9/2013    q 5 yr colonoscopy     Bell's palsy 1/17/2007     Deviated septum 9/17/2013    See CT scan     Hypertension      LVH (left ventricular hypertrophy) 6/7/2011    see stress echo     NI (obstructive sleep apnea) 2016    moderate - dental appliance     Sensory polyneuropathy 2014    feet - see neuro consult     VENTRAL HERNIA NEC 6/19/2007     Past Surgical History:   Procedure Laterality Date     COLONOSCOPY  9/30/2013    Procedure: COMBINED COLONOSCOPY, SINGLE BIOPSY/POLYPECTOMY BY BIOPSY;  colonoscopy, polypectomy by biopsy;  Surgeon: Pedro Chris MD;  Location:  GI     COLONOSCOPY N/A 8/7/2017    Procedure: COLONOSCOPY;  colonoscopy;  Surgeon: Kiel Solomon MD;  Location:  GI     EXCISE MASS HEAD  11/18/2013    Procedure: EXCISE MASS HEAD;  Excision of left forehead mass;  Surgeon: Italia  MD Pelon;  Location: [facility] OR     HC REPAIR UMBILICAL YVONNE,5+Y/O, INCARCERATED OR STRANGULATED  6/9/2004     LASER YAG CAPSULOTOMY Left 6/2/2016    Procedure: LASER YAG CAPSULOTOMY;  Surgeon: Joss Raza MD;  Location: [facility] OR     PHACOEMULSIFICATION WITH STANDARD INTRAOCULAR LENS IMPLANT Left 11/5/2015    Procedure: PHACOEMULSIFICATION WITH STANDARD INTRAOCULAR LENS IMPLANT;  Surgeon: Joss Raza MD;  Location: [facility] OR     Social History     Tobacco Use     Smoking status: Never Smoker     Smokeless tobacco: Never Used   Substance Use Topics     Alcohol use: Yes     Alcohol/week: 0.0 oz     Comment: 24 beers per week     Current Outpatient Medications   Medication Sig Dispense Refill     amLODIPine (NORVASC) 5 MG tablet Take 1 tablet (5 mg) by mouth 2 times daily 60 tablet 0     aspirin (ASPIRIN LOW DOSE) 81 MG tablet Take 1 tablet by mouth daily.       azithromycin (ZITHROMAX) 250 MG tablet Two tablets first day, then one tablet daily for four days. 6 tablet 0     Cyanocobalamin (B-12 PO)        fluticasone (FLONASE) 50 MCG/ACT nasal spray USE 2 SPRAYS IN EACH NOSTRIL ONCE DAILY 16 g 10     GARLIC-X PO        Glucosamine-Chondroitin (GLUCOSAMINE CHONDR COMPLEX PO) Take  by mouth.       guaiFENesin-codeine (ROBITUSSIN AC) 100-10 MG/5ML solution Take 5-10 mLs by mouth every 6 hours as needed for cough 240 mL 0     hydrochlorothiazide (HYDRODIURIL) 25 MG tablet Take 1 tablet (25 mg) by mouth daily Please follow up before med runs out 30 tablet 0     MAGNESIUM CITRATE PO        Omega-3 Fatty Acids (FISH OIL CONCENTRATE PO)        omeprazole (PRILOSEC) 20 MG CR capsule Take 1 capsule (20 mg) by mouth daily 90 capsule 3     order for DME Equipment ordered: RESMED Auto PAP Mask type: Full face  Settings: 5-15 cm H2O       Saw Palmetto, Serenoa repens, (SAW PALMETTO CONCENTRATE OR)        Thiamine HCl (VITAMIN B-1 PO)        nitroGLYcerin (NITROSTAT) 0.4 MG sublingual tablet For chest pain place 1 tablet  "under the tongue every 5 minutes for 3 doses. If symptoms persist 5 minutes after 1st dose call 911. (Patient not taking: Reported on 1/24/2019) 25 tablet 0       REVIEW OF SYSTEMS:   5 point ROS negative except as noted above in HPI, including Gen., Resp, CV, GI &  system review.     OBJECTIVE:  Vitals: /78   Pulse 88   Temp 98.7  F (37.1  C) (Temporal)   Resp 20   Ht 1.766 m (5' 9.53\")   Wt 108.2 kg (238 lb 9.6 oz)   SpO2 96%   BMI 34.70 kg/m    BMI= Body mass index is 34.7 kg/m .  He is alert appears in no distress.  Eyes are normal.  Ears are clear.  Throat clear little drainage.  Neck supple no adenopathy.  His lungs with bilateral rhonchi and wheezing.  Heart regular rhythm.    ASSESSMENT:  Bronchitis    PLAN:  He states that Z-Christian is done well in the past and will order this.  Some Robitussin with codeine as well.  Continue on his other medications.  He will follow-up if not improving.        Byron Ross MD  [facility]            "

## 2019-02-26 ENCOUNTER — TELEPHONE (OUTPATIENT)
Dept: FAMILY MEDICINE | Facility: CLINIC | Age: 59
End: 2019-02-26

## 2019-02-26 DIAGNOSIS — I10 HYPERTENSION GOAL BP (BLOOD PRESSURE) < 140/90: ICD-10-CM

## 2019-02-27 RX ORDER — HYDROCHLOROTHIAZIDE 25 MG/1
TABLET ORAL
Qty: 30 TABLET | Refills: 0 | OUTPATIENT
Start: 2019-02-27

## 2019-02-27 NOTE — TELEPHONE ENCOUNTER
"Hydrochlorothiazide  Last Written Prescription Date:  01/02/2019  Last Fill Quantity: 30,  # refills: 0   Last office visit: 1/24/2019 with prescribing provider:  Cody   Future Office Visit:  None  Routing refill request to provider for review/approval because:  Labs not current:  Creatinine, Potassium, Sodium    Requested Prescriptions   Pending Prescriptions Disp Refills     hydrochlorothiazide (HYDRODIURIL) 25 MG tablet [Pharmacy Med Name: HYDROCHLOROTHIAZIDE 25MG TABS] 30 tablet 0     Sig: TAKE ONE TABLET BY MOUTH ONCE DAILY - PLEASE FOLLOW UP BEFORE MEDICATION RUNS OUT    Diuretics (Including Combos) Protocol Failed - 2/26/2019  7:17 PM       Failed - Normal serum creatinine on file in past 12 months    Recent Labs   Lab Test 11/28/17  1143   CR 0.86          Failed - Normal serum potassium on file in past 12 months    Recent Labs   Lab Test 11/28/17  1143   POTASSIUM 4.1          Failed - Normal serum sodium on file in past 12 months    Recent Labs   Lab Test 11/28/17  1143             Passed - Blood pressure under 140/90 in past 12 months    BP Readings from Last 3 Encounters:   01/24/19 136/78   05/16/18 140/80   05/09/18 132/84          Passed - Recent (12 mo) or future (30 days) visit within the authorizing provider's specialty    Patient had office visit in the last 12 months or has a visit in the next 30 days with authorizing provider or within the authorizing provider's specialty.  See \"Patient Info\" tab in inbasket, or \"Choose Columns\" in Meds & Orders section of the refill encounter.         Passed - Medication is active on med list       Passed - Patient is age 18 or older      Lorin Diana RN   "

## 2019-02-27 NOTE — TELEPHONE ENCOUNTER
I have not seen him since 2017.  Looks like the last time he was seen for this with Mr. Echevarria was in 5/2018.  Either has Mr. Echevarria address this or needs to follow up.  On last prescription, instruction included follow up before med runs out. thanks

## 2019-02-28 NOTE — TELEPHONE ENCOUNTER
Called patient and he states he doesn't want to follow up with anyone at this time. States he is just going to stop taking it for now.

## 2019-06-07 NOTE — MR AVS SNAPSHOT
"              After Visit Summary   12/11/2017    Jeevan Lopez    MRN: [id number]           Patient Information     Date Of Birth          1960        Visit Information        Provider Department      12/11/2017 2:00 PM Dung Montes MD [facility]        Today's Diagnoses     Acute chest pain    -  1    Atypical chest pain        Hypertension goal BP (blood pressure) < 140/90           Follow-ups after your visit        Additional Services     Follow-Up with Cardiologist                 Future tests that were ordered for you today     Open Future Orders        Priority Expected Expires Ordered    Follow-Up with Cardiologist Routine 2/10/2018 12/11/2018 12/11/2017    CT Coronary Calcium Scan Routine 12/12/2017 12/11/2018 12/11/2017            Who to contact     If you have questions or need follow up information about today's clinic visit or your schedule please contact [facility] directly at [phone].  Normal or non-critical lab and imaging results will be communicated to you by Oxtexhart, letter or phone within 4 business days after the clinic has received the results. If you do not hear from us within 7 days, please contact the clinic through Oxtexhart or phone. If you have a critical or abnormal lab result, we will notify you by phone as soon as possible.  Submit refill requests through Oz Sonotek or call your pharmacy and they will forward the refill request to us. Please allow 3 business days for your refill to be completed.          Additional Information About Your Visit        Oxtexhart Information     Oz Sonotek lets you send messages to your doctor, view your test results, renew your prescriptions, schedule appointments and more. To sign up, go to [email or web address] . Click on \"Log in\" on the left side of the screen, which will take you to the Welcome page. Then click on \"Sign up Now\" on the right side of " "the page.     You will be asked to enter the access code listed below, as well as some personal information. Please follow the directions to create your username and password.     Your access code is: [email or web address]  Expires: 2018 11:34 AM     Your access code will  in 90 days. If you need help or a new code, please call your [facility] clinic or [phone].        Care EveryWhere ID     This is your Care EveryWhere ID. This could be used by other organizations to access your [facility] medical records  [id number]        Your Vitals Were     Pulse Height Pulse Oximetry BMI (Body Mass Index)          84 1.765 m (5' 9.5\") 96% 36.27 kg/m2         Blood Pressure from Last 3 Encounters:   17 142/88   17 132/80   17 131/79    Weight from Last 3 Encounters:   17 113 kg (249 lb 3.2 oz)   17 114.1 kg (251 lb 9.6 oz)   17 114.3 kg (252 lb)                 Where to get your medicines      These medications were sent to [facility], [place] [address] [address]  [address] [place] [address] , [facility] [address]     Phone:  [phone]     amLODIPine 5 MG tablet          Primary Care Provider Office Phone # Fax #    Alistair Kenroy Martinez [phone]       [address] [facility]   [facility] [address]        Equal Access to Services     [facility]: Hadii aad ku hadasho Soomaali, waaxda luqadaha, qaybta kaalmada sharonyaana, dayanara ashley . So [facility] [phone].    ATENCIÓN: Si habla español, tiene a antoine disposición servicios gratuitos de asistencia lingüística. Llame al [phone].    We comply with applicable federal civil rights laws and [place] laws. We do not discriminate on the basis of race, color, national origin, age, disability, sex, sexual orientation, or gender identity.            Thank you!     Thank you for choosing [facility]  for your care. Our goal is always to provide you with excellent " care. Hearing back from our patients is one way we can continue to improve our services. Please take a few minutes to complete the written survey that you may receive in the mail after your visit with us. Thank you!             Your Updated Medication List - Protect others around you: Learn how to safely use, store and throw away your medicines at www.disposemymeds.org.          This list is accurate as of: 12/11/17  2:58 PM.  Always use your most recent med list.                   Brand Name Dispense Instructions for use Diagnosis    amLODIPine 5 MG tablet    NORVASC    180 tablet    Take 1 tablet (5 mg) by mouth 2 times daily    Hypertension goal BP (blood pressure) < 140/90       ASPIRIN LOW DOSE 81 MG tablet   Generic drug:  aspirin      Take 1 tablet by mouth daily.        B-12 PO           FISH OIL CONCENTRATE PO           fluticasone 50 MCG/ACT spray    FLONASE    16 g    USE 2 SPRAYS IN EACH NOSTRIL ONCE DAILY    Chronic rhinitis, unspecified type       GARLIC-X PO           GLUCOSAMINE CHONDR COMPLEX PO      Take  by mouth.        hydrochlorothiazide 25 MG tablet    HYDRODIURIL    90 tablet    Take 1 tablet (25 mg) by mouth daily    Hypertension goal BP (blood pressure) < 140/90       MAGNESIUM CITRATE PO           nitroGLYcerin 0.4 MG sublingual tablet    NITROSTAT    25 tablet    For chest pain place 1 tablet under the tongue every 5 minutes for 3 doses. If symptoms persist 5 minutes after 1st dose call 911.    Atypical chest pain       omeprazole 20 MG CR capsule    priLOSEC    90 capsule    Take 1 capsule (20 mg) by mouth daily    Gastroesophageal reflux disease, esophagitis presence not specified       order for DME      Equipment ordered: RESMED Auto PAP Mask type: Full face  Settings: 5-15 cm H2O        SAW PALMETTO CONCENTRATE OR           VITAMIN B-1 PO              Additional Epidermal Sutures: 3-0 Prolene

## 2019-08-15 ENCOUNTER — OFFICE VISIT (OUTPATIENT)
Dept: FAMILY MEDICINE | Facility: CLINIC | Age: 59
End: 2019-08-15
Payer: COMMERCIAL

## 2019-08-15 ENCOUNTER — DOCUMENTATION ONLY (OUTPATIENT)
Dept: OTHER | Facility: CLINIC | Age: 59
End: 2019-08-15

## 2019-08-15 ENCOUNTER — TELEPHONE (OUTPATIENT)
Dept: FAMILY MEDICINE | Facility: CLINIC | Age: 59
End: 2019-08-15

## 2019-08-15 VITALS
TEMPERATURE: 96.3 F | RESPIRATION RATE: 16 BRPM | WEIGHT: 258 LBS | DIASTOLIC BLOOD PRESSURE: 97 MMHG | BODY MASS INDEX: 37.52 KG/M2 | HEART RATE: 85 BPM | OXYGEN SATURATION: 97 % | SYSTOLIC BLOOD PRESSURE: 153 MMHG

## 2019-08-15 DIAGNOSIS — Z12.5 SCREENING FOR PROSTATE CANCER: ICD-10-CM
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
## 2019-08-15 DIAGNOSIS — G47.33 OSA (OBSTRUCTIVE SLEEP APNEA): ICD-10-CM

## 2019-08-15 DIAGNOSIS — J31.0 CHRONIC RHINITIS: ICD-10-CM

## 2019-08-15 DIAGNOSIS — I10 HYPERTENSION GOAL BP (BLOOD PRESSURE) < 140/90: Primary | ICD-10-CM

## 2019-08-15 DIAGNOSIS — E78.5 HYPERLIPIDEMIA LDL GOAL <130: ICD-10-CM

## 2019-08-15 DIAGNOSIS — K21.9 GASTROESOPHAGEAL REFLUX DISEASE WITHOUT ESOPHAGITIS: ICD-10-CM

## 2019-08-15 LAB
ALBUMIN SERPL-MCNC: 3.5 G/DL (ref 3.4–5)
ALP SERPL-CCNC: 82 U/L (ref 40–150)
ALT SERPL W P-5'-P-CCNC: 15 U/L (ref 0–70)
ANION GAP SERPL CALCULATED.3IONS-SCNC: 5 MMOL/L (ref 3–14)
AST SERPL W P-5'-P-CCNC: 15 U/L (ref 0–45)
BILIRUB SERPL-MCNC: 0.5 MG/DL (ref 0.2–1.3)
BUN SERPL-MCNC: 18 MG/DL (ref 7–30)
CALCIUM SERPL-MCNC: 9.2 MG/DL (ref 8.5–10.1)
CHLORIDE SERPL-SCNC: 103 MMOL/L (ref 94–109)
CHOLEST SERPL-MCNC: 196 MG/DL
CO2 SERPL-SCNC: 32 MMOL/L (ref 20–32)
CREAT SERPL-MCNC: 0.9 MG/DL (ref 0.66–1.25)
GFR SERPL CREATININE-BSD FRML MDRD: >90 ML/MIN/{1.73_M2}
GLUCOSE SERPL-MCNC: 87 MG/DL (ref 70–99)
HDLC SERPL-MCNC: 67 MG/DL
LDLC SERPL CALC-MCNC: 108 MG/DL
NONHDLC SERPL-MCNC: 129 MG/DL
POTASSIUM SERPL-SCNC: 4.3 MMOL/L (ref 3.4–5.3)
PROT SERPL-MCNC: 7.2 G/DL (ref 6.8–8.8)
PSA SERPL-ACNC: 1.22 UG/L (ref 0–4)
SODIUM SERPL-SCNC: 140 MMOL/L (ref 133–144)
TRIGL SERPL-MCNC: 105 MG/DL

## 2019-08-15 PROCEDURE — 80061 LIPID PANEL: CPT | Performed by: FAMILY MEDICINE

## 2019-08-15 PROCEDURE — G0103 PSA SCREENING: HCPCS | Performed by: FAMILY MEDICINE

## 2019-08-15 PROCEDURE — 99214 OFFICE O/P EST MOD 30 MIN: CPT | Performed by: FAMILY MEDICINE

## 2019-08-15 PROCEDURE — 36415 COLL VENOUS BLD VENIPUNCTURE: CPT | Performed by: FAMILY MEDICINE

## 2019-08-15 PROCEDURE — 99000 SPECIMEN HANDLING OFFICE-LAB: CPT | Performed by: FAMILY MEDICINE

## 2019-08-15 PROCEDURE — 80053 COMPREHEN METABOLIC PANEL: CPT | Performed by: FAMILY MEDICINE

## 2019-08-15 PROCEDURE — 86617 LYME DISEASE ANTIBODY: CPT | Mod: 90 | Performed by: FAMILY MEDICINE

## 2019-08-15 RX ORDER — AMLODIPINE BESYLATE 10 MG/1
5 TABLET ORAL DAILY
Qty: 90 TABLET | Refills: 1 | Status: SHIPPED | OUTPATIENT
Start: 2019-08-15 | End: 2020-08-06

## 2019-08-15 RX ORDER — FLUTICASONE PROPIONATE 50 MCG
2 SPRAY, SUSPENSION (ML) NASAL DAILY
Qty: 16 G | Refills: 11 | Status: SHIPPED | OUTPATIENT
Start: 2019-08-15 | End: 2021-04-20

## 2019-08-15 RX ORDER — HYDROCHLOROTHIAZIDE 12.5 MG/1
25 CAPSULE ORAL DAILY
Qty: 90 CAPSULE | Refills: 1 | Status: SHIPPED | OUTPATIENT
Start: 2019-08-15 | End: 2020-11-17

## 2019-08-15 ASSESSMENT — PAIN SCALES - GENERAL: PAINLEVEL: NO PAIN (0)

## 2019-08-15 NOTE — TELEPHONE ENCOUNTER
Called patient and left a voicemail to call the clinic back, when the patient calls back please relay the message below.     Jihan Fernández CMA     Notes recorded by Alistair Martinez MD on 8/15/2019 at 4:31 PM CDT  Please call with results. Please ensure that his prostate enzyme level was normal.  His cholesterol looks good; significant improvement as compared to 2 years ago.  His kidney function test, liver function test and electrolytes were normal.  No diabetes.  Thank you     Alistair Martinez MD.

## 2019-08-15 NOTE — PROGRESS NOTES
Subjective     Jeevan Lopez is a 58 year old male who presents to clinic today for the following health issues:    HPI   Hypertension Follow-up      Do you check your blood pressure regularly outside of the clinic? Yes     Are you following a low salt diet? No    Are your blood pressures ever more than 140 on the top number (systolic) OR more   than 90 on the bottom number (diastolic), for example 140/90? Yes      How many servings of fruits and vegetables do you eat daily?  0-1    On average, how many sweetened beverages do you drink each day (soda, juice, sweet tea, etc)?   1    How many days per week do you miss taking your medication? Has been out of medication for about 2-3 months    Jeevan is here today for follow-up on his high blood pressure.  He also known to have hyperlipidemia, sleep apnea, GERD and chronic rhinitis.  Does not check his blood pressure at home.  Ran out of the medication for couple months.  Was on hydrochlorothiazide and Norvasc and they were working well with no side effect.  No headache or dizziness.  No acute change in his vision.  No chest pain or shortness of breath.  No leg swelling, orthopnea or dyspnea.  Stated that he continue to exercise regularly about 3-4 times a week with biking.  Also tried to stay with high salty diet.  He has been working on losing weight.  Denied of excessive caffeine intake.  No drugs or alcohol.  Not take anything for his high cholesterol by choice - he is more interested in natural supplements.  He is interested to have his cholesterol checked today.  He takes omega-3 and saw palmetto.  He also has been taking low-dose aspirin.    Stated he was diagnosed with sleep apnea and was recommended CPAP.  He could not tolerate a CPAP and not interested to try it again.  He is working on weight loss.  Denied of excessive daytime sleepiness or fatigue.    He also has chronic rhinitis.  Over-the-counter antihistamine has not been effective.  Tried the nasal spray in  the past and it worked well.  Interested to restart that again.    He also has heartburn.  Was on omeprazole which worked well.  He is hesitant to take it long-term due to fear of potential side effect.  Interested in alternative.  Never tried Zantac before.  The heartburn has been acting up.  No nausea vomiting.  No diarrhea or constipation.    Also would like to be checked for Lyme disease.  Stated that he has had bitten by ticks multiple times but never been evaluated.  Recently he was diagnosed with Bell Palsy which has resolved.  Never had any rashes.  Has joint pain on and off.  No other concerns.    Patient Active Problem List   Diagnosis     Hypertension goal BP (blood pressure) < 140/90     Hyperlipidemia LDL goal <130     Chronic rhinitis     Deviated septum     GERD (gastroesophageal reflux disease)     Adenomatous polyp of colon     Sleep disturbance     ED (erectile dysfunction)     Anxiety     Chronic prostatitis     NI (obstructive sleep apnea)     Morbid obesity (H)     Alcoholic peripheral neuropathy (H)     Alcoholism /alcohol abuse (H)     Acute pain of left shoulder     Past Surgical History:   Procedure Laterality Date     COLONOSCOPY  9/30/2013    Procedure: COMBINED COLONOSCOPY, SINGLE BIOPSY/POLYPECTOMY BY BIOPSY;  colonoscopy, polypectomy by biopsy;  Surgeon: Pedro Chris MD;  Location: [facility]     COLONOSCOPY N/A 8/7/2017    Procedure: COLONOSCOPY;  colonoscopy;  Surgeon: Kiel Solomon MD;  Location: [facility]     EXCISE MASS HEAD  11/18/2013    Procedure: EXCISE MASS HEAD;  Excision of left forehead mass;  Surgeon: Pelon Echavarria MD;  Location: [facility]     HC REPAIR UMBILICAL YVONNE,5+Y/O, INCARCERATED OR STRANGULATED  6/9/2004     LASER YAG CAPSULOTOMY Left 6/2/2016    Procedure: LASER YAG CAPSULOTOMY;  Surgeon: Joss Raza MD;  Location: [facility]     PHACOEMULSIFICATION WITH STANDARD INTRAOCULAR LENS IMPLANT Left 11/5/2015    Procedure: PHACOEMULSIFICATION WITH STANDARD  INTRAOCULAR LENS IMPLANT;  Surgeon: Joss Raza MD;  Location:  OR       Social History     Tobacco Use     Smoking status: Never Smoker     Smokeless tobacco: Never Used   Substance Use Topics     Alcohol use: Yes     Alcohol/week: 0.0 oz     Comment: 24 beers per week     Family History   Problem Relation Age of Onset     Diabetes Father      Hypertension Father      Heart Disease Father         double bypass-smoker     Cancer Father         lung, brain     Hypertension Brother      Hypertension Mother      Unknown/Adopted Maternal Grandmother      Unknown/Adopted Maternal Grandfather      Unknown/Adopted Paternal Grandmother      Unknown/Adopted Paternal Grandfather          Current Outpatient Medications   Medication Sig Dispense Refill     amLODIPine (NORVASC) 10 MG tablet Take 0.5 tablets (5 mg) by mouth daily 90 tablet 1     aspirin (ASPIRIN LOW DOSE) 81 MG tablet Take 1 tablet by mouth daily.       Cyanocobalamin (B-12 PO)        fluticasone (FLONASE) 50 MCG/ACT nasal spray Spray 2 sprays into both nostrils daily 16 g 11     GARLIC-X PO        Glucosamine-Chondroitin (GLUCOSAMINE CHONDR COMPLEX PO) Take  by mouth.       hydrochlorothiazide (MICROZIDE) 12.5 MG capsule Take 2 capsules (25 mg) by mouth daily 90 capsule 1     MAGNESIUM CITRATE PO        Omega-3 Fatty Acids (FISH OIL CONCENTRATE PO)        ranitidine (ZANTAC) 150 MG capsule Take 1 capsule (150 mg) by mouth 2 times daily 180 capsule 3     Saw Palmetto, Serenoa repens, (SAW PALMETTO CONCENTRATE OR)        Thiamine HCl (VITAMIN B-1 PO)        nitroGLYcerin (NITROSTAT) 0.4 MG sublingual tablet For chest pain place 1 tablet under the tongue every 5 minutes for 3 doses. If symptoms persist 5 minutes after 1st dose call 911. (Patient not taking: Reported on 1/24/2019) 25 tablet 0     order for DME Equipment ordered: RESMED Auto PAP Mask type: Full face  Settings: 5-15 cm H2O       Allergies   Allergen Reactions     Lisinopril Other (See  Comments)     Dry hacky cough     BP Readings from Last 3 Encounters:   08/15/19 (!) 153/97   01/24/19 136/78   05/16/18 140/80    Wt Readings from Last 3 Encounters:   08/15/19 117 kg (258 lb)   01/24/19 108.2 kg (238 lb 9.6 oz)   05/16/18 111.6 kg (246 lb)           Reviewed and updated as needed this visit by Provider         Review of Systems   ROS COMP: Constitutional, HEENT, cardiovascular, pulmonary, gi and gu systems are negative, except as otherwise noted.      Objective    Pulse 85   Temp 96.3  F (35.7  C) (Temporal)   Resp 16   Wt 117 kg (258 lb)   SpO2 97%   BMI 37.52 kg/m    Body mass index is 37.52 kg/m .  Physical Exam   GENERAL: healthy, alert and no distress.  HENT: ear canals and TM's normal. Nares are congested with clear drainage. Oropharynx is pink and moist. No tender with palpation to the sinuses.  NECK: no adenopathy, supple  RESP: lungs clear to auscultation - no rales, rhonchi or wheezes  CV: regular rate and rhythm, no murmur, no peripheral edema and peripheral pulses strong  ABDOMEN: soft, nontender, nondistended, no palpable masses organomegaly with normal bowel sounds.  MS: no gross musculoskeletal defects noted, no edema. No foal weakness  SKIN: no suspicious lesions or rashes  NEURO: Normal strength and tone, mentation intact and speech normal.  Cranial nerves II to XII intact.  DTRs +2 throughout.  No focal neurological deficit.  PSYCH: mentation appears normal, affect normal/bright    Diagnostic Test Results:  Labs reviewed in Epic  Results for orders placed or performed in visit on 08/15/19   Lipid panel reflex to direct LDL Fasting   Result Value Ref Range    Cholesterol 196 <200 mg/dL    Triglycerides 105 <150 mg/dL    HDL Cholesterol 67 >39 mg/dL    LDL Cholesterol Calculated 108 (H) <100 mg/dL    Non HDL Cholesterol 129 <130 mg/dL   Comprehensive metabolic panel (BMP + Alb, Alk Phos, ALT, AST, Total. Bili, TP)   Result Value Ref Range    Sodium 140 133 - 144 mmol/L     Potassium 4.3 3.4 - 5.3 mmol/L    Chloride 103 94 - 109 mmol/L    Carbon Dioxide 32 20 - 32 mmol/L    Anion Gap 5 3 - 14 mmol/L    Glucose 87 70 - 99 mg/dL    Urea Nitrogen 18 7 - 30 mg/dL    Creatinine 0.90 0.66 - 1.25 mg/dL    GFR Estimate >90 >60 mL/min/[1.73_m2]    GFR Estimate If Black >90 >60 mL/min/[1.73_m2]    Calcium 9.2 8.5 - 10.1 mg/dL    Bilirubin Total 0.5 0.2 - 1.3 mg/dL    Albumin 3.5 3.4 - 5.0 g/dL    Protein Total 7.2 6.8 - 8.8 g/dL    Alkaline Phosphatase 82 40 - 150 U/L    ALT 15 0 - 70 U/L    AST 15 0 - 45 U/L   PSA, screen   Result Value Ref Range    PSA 1.22 0 - 4 ug/L   Lyme Confirm IgG by Immunoblot   Result Value Ref Range    Lyme Confirm IgG by Immunoblot Negative Negative           Assessment & Plan     1. Hypertension goal BP (blood pressure) < 140/90  Not controlled and his BP is high today.  He has been off the medication in the last couple months.  Discussed with him about the nature of the condition and emphasize the importance of having it under controlled. Educated him about the long and short term consequences of uncontrolled HTN as well as the goal for his blood pressure.  Restarted the Norvasc and hydrochlorothiazide, which he tolerated well in the past.  Encouraged low salt and healthy diet, daily excercise and weight loss. Lab as ordered.  Return in couple weeks for BP check and follow up in 6 months, earlier as needed.    - Comprehensive metabolic panel (BMP + Alb, Alk Phos, ALT, AST, Total. Bili, TP)  - amLODIPine (NORVASC) 10 MG tablet; Take 0.5 tablets (5 mg) by mouth daily  Dispense: 90 tablet; Refill: 1  - hydrochlorothiazide (MICROZIDE) 12.5 MG capsule; Take 2 capsules (25 mg) by mouth daily  Dispense: 90 capsule; Refill: 1    2. Hyperlipidemia LDL goal <130  Cholesterol level today looks good.  Not on medication.  Healthy lifestyle modification as discussed above.  Continue with omega-3.    - Lipid panel reflex to direct LDL Fasting    3. NI (obstructive sleep  apnea)  Could not tolerate CPAP.  Discussed with him the potential effect of uncontrolled sleep apnea which may lead to heart failure or heart disease.  He still does not want to try the CPAP again.  Encouraged him to keep working on weight loss.    4. Gastroesophageal reflux disease without esophagitis  Discussed with him about nature condition.  Diet modification discussed.  Started him on the Zantac twice a day as needed.  - ranitidine (ZANTAC) 150 MG capsule; Take 1 capsule (150 mg) by mouth 2 times daily  Dispense: 180 capsule; Refill: 3    5. Chronic rhinitis  Mostly due to allergy.  Discussed with him about nature condition.  Restart the Flonase and he was taught how to use the nasal spray correctly.  Over-the-counter Zyrtec/Claritin or Allegra as needed.    - fluticasone (FLONASE) 50 MCG/ACT nasal spray; Spray 2 sprays into both nostrils daily  Dispense: 16 g; Refill: 11    6. Tick bite, initial encounter  Labs as ordered.  Further evaluation/management based on the result.    - Lyme Confirm IgG by Immunoblot    7. Screening for prostate cancer  Discussed with him about the pros and cons of prostate screening cancer with PSA.  Also educated about the potential false positive which may require unnecessary work-up.  He was informed of negative/normal PSA leve does not rule out prostate cancer completely although it is very unlikely.  He understands and is willing to take the risk.     - PSA, screen      No follow-ups on file.    Alistair Jasmine Mai, MD  [facility]

## 2019-08-16 ENCOUNTER — MYC MEDICAL ADVICE (OUTPATIENT)
Dept: FAMILY MEDICINE | Facility: CLINIC | Age: 59
End: 2019-08-16

## 2019-08-17 LAB — B BURGDOR IGG SER QL IB: NEGATIVE

## 2019-09-09 ENCOUNTER — TELEPHONE (OUTPATIENT)
Dept: FAMILY MEDICINE | Facility: CLINIC | Age: 59
End: 2019-09-09

## 2019-09-09 ASSESSMENT — ANXIETY QUESTIONNAIRES
5. BEING SO RESTLESS THAT IT IS HARD TO SIT STILL: NOT AT ALL
7. FEELING AFRAID AS IF SOMETHING AWFUL MIGHT HAPPEN: NOT AT ALL
1. FEELING NERVOUS, ANXIOUS, OR ON EDGE: MORE THAN HALF THE DAYS
6. BECOMING EASILY ANNOYED OR IRRITABLE: SEVERAL DAYS
GAD7 TOTAL SCORE: 5
3. WORRYING TOO MUCH ABOUT DIFFERENT THINGS: SEVERAL DAYS
IF YOU CHECKED OFF ANY PROBLEMS ON THIS QUESTIONNAIRE, HOW DIFFICULT HAVE THESE PROBLEMS MADE IT FOR YOU TO DO YOUR WORK, TAKE CARE OF THINGS AT HOME, OR GET ALONG WITH OTHER PEOPLE: NOT DIFFICULT AT ALL
2. NOT BEING ABLE TO STOP OR CONTROL WORRYING: SEVERAL DAYS

## 2019-09-09 ASSESSMENT — PATIENT HEALTH QUESTIONNAIRE - PHQ9
SUM OF ALL RESPONSES TO PHQ QUESTIONS 1-9: 9
5. POOR APPETITE OR OVEREATING: NOT AT ALL

## 2019-09-09 NOTE — TELEPHONE ENCOUNTER
Alistair Martinez MD [facility]             Please have pt to stop by for BP check. Also recommend to get the flu vaccination.  thanks

## 2019-09-10 ASSESSMENT — ANXIETY QUESTIONNAIRES: GAD7 TOTAL SCORE: 5

## 2019-09-26 ENCOUNTER — OFFICE VISIT (OUTPATIENT)
Dept: FAMILY MEDICINE | Facility: OTHER | Age: 59
End: 2019-09-26
Payer: COMMERCIAL

## 2019-09-26 VITALS
HEART RATE: 80 BPM | TEMPERATURE: 97.3 F | SYSTOLIC BLOOD PRESSURE: 146 MMHG | WEIGHT: 260.8 LBS | BODY MASS INDEX: 37.93 KG/M2 | RESPIRATION RATE: 16 BRPM | DIASTOLIC BLOOD PRESSURE: 96 MMHG

## 2019-09-26 DIAGNOSIS — M54.50 CHRONIC LEFT-SIDED LOW BACK PAIN WITHOUT SCIATICA: Primary | ICD-10-CM

## 2019-09-26 DIAGNOSIS — G89.29 CHRONIC LEFT-SIDED LOW BACK PAIN WITHOUT SCIATICA: Primary | ICD-10-CM

## 2019-09-26 DIAGNOSIS — G62.1 ALCOHOLIC PERIPHERAL NEUROPATHY (H): ICD-10-CM

## 2019-09-26 DIAGNOSIS — G47.33 OSA (OBSTRUCTIVE SLEEP APNEA): ICD-10-CM

## 2019-09-26 DIAGNOSIS — I10 HYPERTENSION GOAL BP (BLOOD PRESSURE) < 140/90: ICD-10-CM

## 2019-09-26 DIAGNOSIS — E66.01 MORBID OBESITY (H): ICD-10-CM

## 2019-09-26 PROCEDURE — 99214 OFFICE O/P EST MOD 30 MIN: CPT | Performed by: PHYSICIAN ASSISTANT

## 2019-09-26 RX ORDER — CYCLOBENZAPRINE HCL 10 MG
10 TABLET ORAL 3 TIMES DAILY PRN
Qty: 30 TABLET | Refills: 0 | Status: SHIPPED | OUTPATIENT
Start: 2019-09-26 | End: 2019-10-06

## 2019-09-26 RX ORDER — METHYLPREDNISOLONE 4 MG
TABLET, DOSE PACK ORAL
Qty: 21 TABLET | Refills: 0 | Status: SHIPPED | OUTPATIENT
Start: 2019-09-26 | End: 2021-04-20

## 2019-09-26 ASSESSMENT — PAIN SCALES - GENERAL: PAINLEVEL: MODERATE PAIN (5)

## 2019-09-26 NOTE — PROGRESS NOTES
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  Date   Sees a specialist for back pain:  No  Therapies tried without relief: back brace, deep heating rub and cold    Alleviating factors:   Improved by: cold      Precipitating factors:  Worsened by: Sitting          Accompanying Signs & Symptoms:  Risk of Fracture:  None  Risk of Cauda Equina:  None  Risk of Infection:  None  Risk of Cancer:  None  Risk of Ankylosing Spondylitis:  Onset at age <35, male, AND morning back stiffness. no        Patient Active Problem List   Diagnosis     Hypertension goal BP (blood pressure) < 140/90     Hyperlipidemia LDL goal <130     Chronic rhinitis     Deviated septum     GERD (gastroesophageal reflux disease)     Adenomatous polyp of colon     Sleep disturbance     ED (erectile dysfunction)     Anxiety     Chronic prostatitis     NI (obstructive sleep apnea)     Morbid obesity (H)     Alcoholic peripheral neuropathy (H)     Alcoholism /alcohol abuse (H)     Acute pain of left shoulder     Chronic left-sided low back pain without sciatica     Past Surgical History:   Procedure Laterality Date     COLONOSCOPY  9/30/2013    Procedure: COMBINED COLONOSCOPY, SINGLE BIOPSY/POLYPECTOMY BY BIOPSY;  colonoscopy, polypectomy by biopsy;  Surgeon: Pedro Chris MD;  Location:  GI     COLONOSCOPY N/A 8/7/2017    Procedure: COLONOSCOPY;  colonoscopy;  Surgeon: Kiel Solomon  MD Bart;  Location: [facility]     EXCISE MASS HEAD  11/18/2013    Procedure: EXCISE MASS HEAD;  Excision of left forehead mass;  Surgeon: Pelon Echavarria MD;  Location: [facility]     HC REPAIR UMBILICAL YVONNE,5+Y/O, INCARCERATED OR STRANGULATED  6/9/2004     LASER YAG CAPSULOTOMY Left 6/2/2016    Procedure: LASER YAG CAPSULOTOMY;  Surgeon: Joss Raza MD;  Location: [facility]     PHACOEMULSIFICATION WITH STANDARD INTRAOCULAR LENS IMPLANT Left 11/5/2015    Procedure: PHACOEMULSIFICATION WITH STANDARD INTRAOCULAR LENS IMPLANT;  Surgeon: Joss Raza MD;  Location: [facility]       Social History     Tobacco Use     Smoking status: Never Smoker     Smokeless tobacco: Never Used   Substance Use Topics     Alcohol use: Yes     Alcohol/week: 0.0 standard drinks     Comment: 24 beers per week     Family History   Problem Relation Age of Onset     Diabetes Father      Hypertension Father      Heart Disease Father         double bypass-smoker     Cancer Father         lung, brain     Hypertension Brother      Hypertension Mother      Unknown/Adopted Maternal Grandmother      Unknown/Adopted Maternal Grandfather      Unknown/Adopted Paternal Grandmother      Unknown/Adopted Paternal Grandfather          Current Outpatient Medications   Medication Sig Dispense Refill     amLODIPine (NORVASC) 10 MG tablet Take 0.5 tablets (5 mg) by mouth daily 90 tablet 1     aspirin (ASPIRIN LOW DOSE) 81 MG tablet Take 1 tablet by mouth daily.       Cyanocobalamin (B-12 PO)        cyclobenzaprine (FLEXERIL) 10 MG tablet Take 1 tablet (10 mg) by mouth 3 times daily as needed for muscle spasms ( careful use as this can cause fatigue) 30 tablet 0     fluticasone (FLONASE) 50 MCG/ACT nasal spray Spray 2 sprays into both nostrils daily 16 g 11     GARLIC-X PO        Glucosamine-Chondroitin (GLUCOSAMINE CHONDR COMPLEX PO) Take  by mouth.       hydrochlorothiazide (MICROZIDE) 12.5 MG capsule Take 2 capsules (25 mg) by mouth daily 90 capsule 1      MAGNESIUM CITRATE PO        methylPREDNISolone (MEDROL DOSEPAK) 4 MG tablet therapy pack Follow package directions. 21 tablet 0     nitroGLYcerin (NITROSTAT) 0.4 MG sublingual tablet For chest pain place 1 tablet under the tongue every 5 minutes for 3 doses. If symptoms persist 5 minutes after 1st dose call 911. 25 tablet 0     Omega-3 Fatty Acids (FISH OIL CONCENTRATE PO)        order for DME Equipment ordered: RESMED Auto PAP Mask type: Full face  Settings: 5-15 cm H2O       ranitidine (ZANTAC) 150 MG capsule Take 1 capsule (150 mg) by mouth 2 times daily 180 capsule 3     Saw Palmetto, Serenoa repens, (SAW PALMETTO CONCENTRATE OR)        Thiamine HCl (VITAMIN B-1 PO)        Allergies   Allergen Reactions     Lisinopril Other (See Comments)     Dry hacky cough     Recent Labs   Lab Test 08/15/19  1033 11/28/17  1143 08/02/17  0840 07/19/17  0953 05/11/16  1053 10/30/15  0958  09/17/14  1026   A1C  --   --  5.0  --   --   --   --  5.1   *  --   --  121*  --  84  --   --    HDL 67  --   --  48  --  56  --   --    TRIG 105  --   --  103  --  100  --   --    ALT 15 17  --  18 19  --   --   --    CR 0.90 0.86  --  0.97 0.82  --    < >  --    GFRESTIMATED >90 >90  --  81 >90  Non  GFR Calc    --    < >  --    GFRESTBLACK >90 >90  --  >90   GFR Calc   >90   GFR Calc    --    < >  --    POTASSIUM 4.3 4.1  --  3.9 4.3  --    < >  --    TSH  --   --   --  1.53 2.11  --    < >  --     < > = values in this interval not displayed.      BP Readings from Last 3 Encounters:   09/26/19 (!) 146/96   08/15/19 (!) 153/97   01/24/19 136/78    Wt Readings from Last 3 Encounters:   09/26/19 118.3 kg (260 lb 12.8 oz)   08/15/19 117 kg (258 lb)   01/24/19 108.2 kg (238 lb 9.6 oz)                    Reviewed and updated as needed this visit by Provider         Review of Systems   ROS COMP: Constitutional, HEENT, cardiovascular, pulmonary, GI, , musculoskeletal, neuro, skin, endocrine  and psych systems are negative, except as otherwise noted.      Objective    BP (!) 146/96   Pulse 80   Temp 97.3  F (36.3  C) (Temporal)   Resp 16   Wt 118.3 kg (260 lb 12.8 oz)   BMI 37.93 kg/m    Body mass index is 37.93 kg/m .  Physical Exam   GENERAL: healthy, alert and no distress  RESP: lungs clear to auscultation - no rales, rhonchi or wheezes  CV: regular rate and rhythm, normal S1 S2, no S3 or S4, no murmur, click or rub, no peripheral edema and peripheral pulses strong  ABDOMEN: soft, nontender, no hepatosplenomegaly, no masses and bowel sounds normal  MS: no gross musculoskeletal defects noted, no edema  SKIN: no suspicious lesions or rashes to visible skin  NEURO: Normal strength and tone, mentation intact and speech normal  Comprehensive back pain exam:  Tenderness of Left lateral lumbar region down into the left buttock, Range of motion not limited by pain, Lower extremity strength functional and equal on both sides, Lower extremity reflexes within normal limits bilaterally, Lower extremity sensation normal and equal on both sides and Straight leg raise negative bilaterally  PSYCH: mentation appears normal, affect normal/bright    Diagnostic Test Results:  Labs reviewed in Epic  No results found for this or any previous visit (from the past 24 hour(s)).        Assessment & Plan     1. Chronic left-sided low back pain without sciatica  Advised medication as noted below and gave him multiple exercises to try in his after visit summary.  - cyclobenzaprine (FLEXERIL) 10 MG tablet; Take 1 tablet (10 mg) by mouth 3 times daily as needed for muscle spasms ( careful use as this can cause fatigue)  Dispense: 30 tablet; Refill: 0  - methylPREDNISolone (MEDROL DOSEPAK) 4 MG tablet therapy pack; Follow package directions.  Dispense: 21 tablet; Refill: 0    2. Hypertension goal BP (blood pressure) < 140/90  Strongly advise repeat visit in about 2 weeks to recheck his blood pressure and I would say that he  should be on something more than just Norvasc.  He may certainly choose to do this with his primary care provider.    3. Alcoholic peripheral neuropathy (H)  4. Alcoholism /alcohol abuse (H)  5. Morbid obesity (H)  6. NI (obstructive sleep apnea)  He denies any new concerns though has not been tolerating his CPAP mask well.  I suspect that he is not wearing this at all.  Strongly advised that he follow-up with sleep specialist for this.    Return in about 2 weeks (around 10/10/2019) for recheck of current condition, if symptoms do not improve.    Connor Stoddard PA-C  [facility]

## 2019-09-26 NOTE — PATIENT INSTRUCTIONS
Patient Education     Possible Causes of Low Back or Leg Pain    BIG: The symptoms in your back or leg may be due to pressure on a nerve. This pressure may be caused by a damaged disk or by abnormal bone growth. Either way, you may feel pain, burning, tingling, or numbness. If you have pressure on a nerve that connects to the sciatic nerve, pain may shoot down your leg.    Pressure from the disk  Constant wear and tear can weaken a disk over time and cause back pain. The disk can then be damaged by a sudden movement or injury. If its soft center starts to bulge, the disk may press on a nerve. Or the outside of the disk may tear, and the soft center may squeeze through and pinch a nerve.    Pressure from bone  As a disk wears out, the vertebrae right above and below the disk start to touch. This can put pressure on a nerve. Often, abnormal bone (called bone spurs) grows where the vertebrae rub against each other. This can cause the foramen or the spinal canal to narrow (called stenosis) and press against a nerve.  Date Last Reviewed: 3/1/2018    2028-9486 [employer]. [address] [place] [address]. All rights reserved. This information is not intended as a substitute for professional medical care. Always follow your healthcare professional's instructions.           Patient Education     General Neck and Back Pain    Both neck and back pain are usually caused by injury to the muscles or ligaments of the spine. Sometimes the disks that separate each bone of the spine may cause pain by pressing on a nearby nerve. Back and neck pain may appear after a sudden twisting or bending force (such as in a car accident), or sometimes after a simple awkward movement. In either case, muscle spasm is often present and adds to the pain.  Acute neck and back pain usually gets better in 1 to 2 weeks. Pain related to disk disease, arthritis in the spinal joints or spinal stenosis (narrowing of the spinal  canal) can become chronic and last for months or years.  Back and neck pain are common problems. Most people feel better in 1 or 2 weeks, and most of the rest in 1 to 2 months. Most people can remain active.  People have and describe pain differently.    Pain can be sharp, stabbing, shooting, aching, cramping, or burning    Movement, standing, bending, lifting, sitting, or walking may worsen the pain    Pain can be localized to one spot or area, or it can be more generalized    Pain can spread or radiate upwards, downwards, to the front, or go down your arms    Muscle spasm may occur.  Most of the time mechanical problems with the muscles or spine cause the pain. it is usually caused by an injury, whether known or not, to the muscles or ligaments. While illnesses can cause back pain, it is usually not caused by a serious illness. Pain is usually related to physical activity, whether sports, exercise, work, or normal activity. Sometimes it can occur without an identifiable cause. This can happen simply by stretching or moving wrong, without noting pain at the time. Other causes include:    Overexertion, lifting, pushing, pulling incorrectly or too aggressively.    Sudden twisting, bending or stretching from an accident (car or fall), or accidental movement.    Poor posture    Poor conditioning, lack of regular exercise    Spinal disc disease or arthritis    Stress    Pregnancy, or illness like appendicitis, bladder or kidney infection, pelvic infections   Home care    For neck pain: Use a comfortable pillow that supports the head and keeps the spine in a neutral position. The position of the head should not be tilted forward or backward.    When in bed, try to find a position of comfort. A firm mattress is best. Try lying flat on your back with pillows under your knees. You can also try lying on your side with your knees bent up towards your chest and a pillow between your knees.    At first, do not try to stretch  out the sore spots. If there is a strain, it is not like the good soreness you get after exercising without an injury. In this case, stretching may make it worse.    Don't sit for long periods, as in long car rides or other travel. This puts more stress on the lower back than standing or walking.    During the first 24 to 72 hours after an injury, apply an ice pack to the painful area for 20 minutes and then remove it for 20 minutes over a period of 60 to 90 minutes or several times a day.     You can alternate ice and heat therapies. Talk with your healthcare provider about the best treatment for your back or neck pain. As a safety precaution, do not use a heating pad at bedtime. Sleeping with a heating pad can lead to skin burns or tissue damage.    Therapeutic massage can help relax the back and neck muscles without stretching them.    Be aware of safe lifting methods and do not lift anything over 15 pounds until all the pain is gone.  Medicines  Talk to your healthcare provider before using medicine, especially if you have other medical problems or are taking other medicines.    You may use over-the-counter medicine to control pain, unless another pain medicine was prescribed. If you have chronic conditions like diabetes, liver or kidney disease, stomach ulcers,  gastrointestinal bleeding, or are taking blood thinner medicines.    Be careful if you are given pain medicines, narcotics, or medicine for muscle spasm. They can cause drowsiness, and can affect your coordination, reflexes, and judgment. Do not drive or operate heavy machinery.  Follow-up care  Follow up with your healthcare provider, or as advised. Physical therapy or further tests may be needed.  If X-rays were taken, you will be notified of any new findings that may affect your care.  Call 911  Call 911 if any of the following occur:    Trouble breathing    Confusion    Very drowsy or trouble awakening    Fainting or loss of consciousness    Rapid  or very slow heart rate    Loss of bowel or bladder control  When to seek medical advice  Call your healthcare provider right away if any of these occur:    Pain becomes worse or spreads into your arms or legs    Weakness, numbness or pain in one or both arms or legs    Numbness in the groin area    Difficulty walking    Fever of 100.4 F (38 C) or higher, or as directed by your healthcare provider  Date Last Reviewed: 7/1/2016 2000-2018 [employer]. [address] [place] [address]. All rights reserved. This information is not intended as a substitute for professional medical care. Always follow your healthcare professional's instructions.           Patient Education     Back Basics: A Healthy Spine    A healthy spine supports the body while letting it move freely. It does this with the help of three natural curves. Strong, flexible muscles help, too. They support the spine by keeping its curves properly aligned. The disks that cushion the bones of your spine also play a role in back fitness.  Three natural curves  The spine is made of bones (vertebrae) and pads of soft tissue (disks). These parts are arranged in three curves: cervical, thoracic, and lumbar. When properly aligned, these curves keep your body balanced. They also support your body when you move. By distributing your weight throughout your spine, the curves make back injuries less likely.  Strong, flexible muscles  Strong, flexible back muscles help support the three curves of the spine. They do so by holding the vertebrae and disks in proper alignment. Strong, flexible abdominal, hip, and leg muscles also reduce strain on the back.  The lumbar curve  The lumbar curve is the hardest-working part of the spine. It carries more weight and moves the most. Aligning this curve helps prevent damage to vertebrae, disks, and other parts of the spine.  Cushioning disks  Disks are the soft pads of tissue between the vertebrae. The disks  absorb shock caused by movement. Each disk has a spongy center (nucleus) and a tougher outer ring (annulus). Movement within the nucleus allows the vertebrae to rock back and forth on the disks. This provides the flexibility needed to bend and move.    Date Last Reviewed: 5/1/2018 2000-2018 [employer]. [address] [place] [address]. All rights reserved. This information is not intended as a substitute for professional medical care. Always follow your healthcare professional's instructions.           Patient Education     Back Care Tips    Caring for your back  These are things you can do to prevent a recurrence of acute back pain and to reduce symptoms from chronic back pain:    Maintain a healthy weight. If you are overweight, losing weight will help most types of back pain.    Exercise is an important part of recovery from most types of back pain. The muscles behind and in front of the spine support the back. This means strengthening both the back muscles and the abdominal muscles will provide better support for your spine.     Swimming and brisk walking are good overall exercises to improve your fitness level.    Practice safe lifting methods (below).    Practice good posture when sitting, standing and walking. Avoid prolonged sitting. This puts more stress on the lower back than standing or walking.    Wear quality shoes with sufficient arch support. Foot and ankle alignment can affect back symptoms. Women should avoid wearing high heels.    Therapeutic massage can help relax the back muscles without stretching them.    During the first 24 to 72 hours after an acute injury or flare-up of chronic back pain, apply an ice pack to the painful area for 20 minutes and then remove it for 20 minutes, over a period of 60 to 90 minutes, or several times a day. As a safety precaution, do not use a heating pad at bedtime. Sleeping on a heating pad can lead to skin burns or tissue damage.    You  can alternate ice and heat therapies.  Medicines  Talk to your healthcare provider before using medicines, especially if you have other medical problems or are taking other medicines.    You may use acetaminophen or ibuprofen to control pain, unless your healthcare provider prescribed other pain medicine. If you have chronic conditions like diabetes, liver or kidney disease, stomach ulcers, or gastrointestinal bleeding, or are taking blood thinners, talk with your healthcare provider before taking any medicines.    Be careful if you are given prescription pain medicines, narcotics, or medicine for muscle spasm. They can cause drowsiness, affect your coordination, reflexes, and judgment. Do not drive or operate heavy machinery while taking these types of medicines. Take prescription pain medicine only as prescribed by your healthcare provider.  Lumbar stretch  Here is a simple stretching exercise that will help relax muscle spasm and keep your back more limber. If exercise makes your back pain worse, don t do it.    Lie on your back with your knees bent and both feet on the ground.    Slowly raise your left knee to your chest as you flatten your lower back against the floor. Hold for 5 seconds.    Relax and repeat the exercise with your right knee.    Do 10 of these exercises for each leg.  Safe lifting method    Don t bend over at the waist to lift an object off the floor.  Instead, bend your knees and hips in a squat.     Keep your back and head upright    Hold the object close to your body, directly in front of you.    Straighten your legs to lift the object.     Lower the object to the floor in the reverse fashion.    If you must slide something across the floor, push it.  Posture tips  Sitting  Sit in chairs with straight backs or low-back support. Keep your knees lower than your hips, with your feet flat on the floor.  When driving, sit up straight. Adjust the seat forward so you are not leaning toward the  steering wheel.  A small pillow or rolled towel behind your lower back may help if you are driving long distances.   Standing  When standing for long periods, shift most of your weight to one leg at a time. Alternate legs every few minutes.   Sleeping  The best way to sleep is on your side with your knees bent. Put a low pillow under your head to support your neck in a neutral spine position. Avoid thick pillows that bend your neck to one side. Put a pillow between your legs to further relax your lower back. If you sleep on your back, put pillows under your knees to support your legs in a slightly flexed position. Use a firm mattress. If your mattress sags, replace it, or use a 1/2-inch plywood board under the mattress to add support.  Follow-up care  Follow up with your healthcare provider, or as advised.  If X-rays, a CT scan or an MRI scan were taken, they will be reviewed by a radiologist. You will be notified of any new findings that may affect your care.  Call 911  Call 911 if any of the following occur:    Trouble breathing    Confusion    Very drowsy    Fainting or loss of consciousness    Rapid or very slow heart rate    Loss of  bowel or bladder control  When to seek medical advice  Call your healthcare provider right away if any of the following occur:    Pain becomes worse or spreads to your arms or legs    Weakness or numbness in one or both arms or legs    Numbness in the groin area  Date Last Reviewed: 6/1/2016 2000-2018 [employer]. [address] [place] [address]. All rights reserved. This information is not intended as a substitute for professional medical care. Always follow your healthcare professional's instructions.           Patient Education     Exercises to Strengthen Your Lower Back  Strong lower back and abdominal muscles work together to support your spine. The exercises below will help strengthen the lower back. It is important that you begin exercising slowly  and increase levels gradually.  Always begin any exercise program with stretching. If you feel pain while doing any of these exercises, stop and talk to your doctor about a more specific exercise program that better suits your condition.   Low back stretch  The point of stretching is to make you more flexible and increase your range of motion. Stretch only as much as you are able. Stretch slowly. Do not push your stretch to the limit. If at any point you feel pain while stretching, this is your (temporary) limit.    Lie on your back with your knees bent and both feet on the ground.    Slowly raise your left knee to your chest as you flatten your lower back against the floor. Hold for 5 seconds.    Relax and repeat the exercise with your right knee.    Do 10 of these exercises for each leg.    Repeat hugging both knees to your chest at the same time.  Building lower back strength  Start your exercise routine with 10 to 30 minutes a day, 1 to 3 times a day.  Initial exercises  Lying on your back:  1. Ankle pumps: Move your foot up and down, towards your head, and then away. Repeat 10 times with each foot.  2. Heel slides: Slowly bend your knee, drawing the heel of your foot towards you. Then slide your heel/foot from you, straightening your knee. Do not lift your foot off the floor (this is not a leg lift).  3. Abdominal contraction: Bend your knees and put your hands on your stomach. Tighten your stomach muscles. Hold for 5 seconds, then relax. Repeat 10 times.  4. Straight leg raise: Bend one leg at the knee and keep the other leg straight. Tighten your stomach muscles. Slowly lift your straight leg 6 to 12 inches off the floor and hold for up to 5 seconds. Repeat 10 times on each side.  Standin. Wall squats: Stand with your back against the wall. Move your feet about 12 inches away from the wall. Tighten your stomach muscles, and slowly bend your knees until they are at about a 45 degree angle. Do not go down  too far. Hold about 5 seconds. Then slowly return to your starting position. Repeat 10 times.  2. Heel raises: Stand facing the wall. Slowly raise the heels of your feet up and down, while keeping your toes on the floor. If you have trouble balancing, you can touch the wall with your hands. Repeat 10 times.  More advanced exercises  When you feel comfortable enough, try these exercises.  1. Kneeling lumbar extension: Begin on your hands and knees. At the same time, raise and straighten your right arm and left leg until they are parallel to the ground. Hold for 2 seconds and come back slowly to a starting position. Repeat with left arm and right leg, alternating 10 times.  2. Prone lumbar extension: Lie face down, arms extended overhead, palms on the floor. At the same time, raise your right arm and left leg as high as comfortably possible. Hold for 10 seconds and slowly return to start. Repeat with left arm and right leg, alternating 10 times. Gradually build up to 20 times. (Advanced: Repeat this exercise raising both arms and both legs a few inches off the floor at the same time. Hold for 5 seconds and release.)  3. Pelvic tilt: Lie on the floor on your back with your knees bent at 90 degrees. Your feet should be flat on the floor. Inhale, exhale, then slowly contract your abdominal muscles bringing your navel toward your spine. Let your pelvis rock back until your lower back is flat on the floor. Hold for 10 seconds while breathing smoothly.  4. Abdominal crunch: Perform a pelvic tilt (above) flattening your lower back against the floor. Holding the tension in your abdominal muscles, take another breath and raise your shoulder blades off the ground (this is not a full sit-up). Keep your head in line with your body (don t bend your neck forward). Hold for 2 seconds, then slowly lower.  Date Last Reviewed: 6/1/2016 2000-2018 [employer]. [address] [facility], [place] [address]. All rights  reserved. This information is not intended as a substitute for professional medical care. Always follow your healthcare professional's instructions.           Patient Education     Back Exercises: Lower Back Stretch    To start, sit in a chair with your feet flat on the floor. Shift your weight slightly forward. Relax, and keep your ears, shoulders, and hips aligned while you do the following:    Sit with your feet well apart.    Bend forward and touch the floor with the backs of your hands. Relax and let your body drop.    Hold for 20 seconds. Return to starting position.    Repeat 2 times.   Date Last Reviewed: 11/1/2017 2000-2018 [employer]. [address] [place] [address]. All rights reserved. This information is not intended as a substitute for professional medical care. Always follow your healthcare professional's instructions.           Patient Education     Back Exercises: Partial Curl-Ups    To start, lie on your back with your knees bent and feet flat on the floor. Don t press your neck or lower back to the floor. Breathe deeply. You should feel comfortable and relaxed in this position:    Cross your arms loosely.    Tighten your abdomen and curl skilled nursing up, keeping your head in line with your shoulders.    Hold for 5 seconds. Uncurl to lie down.    Repeat 2 sets of 10.   Date Last Reviewed: 3/1/2018    0244-9181 [employer]. [address] [place] [address]. All rights reserved. This information is not intended as a substitute for professional medical care. Always follow your healthcare professional's instructions.           Patient Education     Back Exercises: Pelvic Tilt    To start, lie on your back with your knees bent and feet flat on the floor. Don t press your neck or lower back to the floor. Breathe deeply. You should feel comfortable and relaxed in this position:    Tighten your stomach and buttocks, and press your lower back toward the floor.  This should be a small, subtle movement. This should not increase your pain.    Hold for 5 to 15 seconds. Release.    Repeat 2 to 5 times.  Date Last Reviewed: 3/1/2018    2789-1889 [employer]. [address] [place] [address], [place] [address]. All rights reserved. This information is not intended as a substitute for professional medical care. Always follow your healthcare professional's instructions.         Advised that he do everything that he can with respect to diet and exercise for his hypertension.  Specifically advise a decrease salt, caffeine, stress and poor eating habits.  Advised that he increase his physical activity and get back on his mountain bike ASAP.

## 2019-09-28 ENCOUNTER — HEALTH MAINTENANCE LETTER (OUTPATIENT)
Age: 59
End: 2019-09-28

## 2019-12-17 ENCOUNTER — TELEPHONE (OUTPATIENT)
Dept: FAMILY MEDICINE | Facility: CLINIC | Age: 59
End: 2019-12-17

## 2019-12-17 NOTE — TELEPHONE ENCOUNTER
Panel Management Review      Patient has the following on his problem list:     Hypertension   Last three blood pressure readings:  BP Readings from Last 3 Encounters:   09/26/19 (!) 146/96   08/15/19 (!) 153/97   01/24/19 136/78     Blood pressure: FAILED    HTN Guidelines:  Less than 140/90      Composite cancer screening  Chart review shows that this patient is due/due soon for the following None  Summary:    Patient is due/failing the following:   BP CHECK and PHYSICAL    Action needed:   Patient needs office visit for physical or BP check.    Type of outreach:    Phone, left message for patient to call back.     Questions for provider review:    None                                                                                                                                    Jihan Fernández CMA      Chart routed to Care Team .

## 2020-03-31 DIAGNOSIS — I10 HYPERTENSION GOAL BP (BLOOD PRESSURE) < 140/90: Primary | ICD-10-CM

## 2020-03-31 RX ORDER — HYDROCHLOROTHIAZIDE 25 MG/1
25 TABLET ORAL DAILY
Qty: 90 TABLET | Refills: 0 | Status: SHIPPED | OUTPATIENT
Start: 2020-03-31 | End: 2021-04-20

## 2020-03-31 NOTE — TELEPHONE ENCOUNTER
Patient is taking hydrochlorothiazide 12.5 mg 2 to make 25 mg.  Do you want patient taking 1 at 25 mg?  Request from pharmacy asked for 25 mg.  Dieudonne Ny, CMA

## 2020-05-06 ENCOUNTER — TELEPHONE (OUTPATIENT)
Dept: FAMILY MEDICINE | Facility: CLINIC | Age: 60
End: 2020-05-06

## 2020-05-06 DIAGNOSIS — J31.0 CHRONIC RHINITIS: ICD-10-CM

## 2020-05-06 RX ORDER — FLUTICASONE PROPIONATE 50 MCG
2 SPRAY, SUSPENSION (ML) NASAL DAILY
Qty: 16 G | Refills: 11 | Status: CANCELLED | OUTPATIENT
Start: 2020-05-06

## 2020-05-06 NOTE — TELEPHONE ENCOUNTER
Attempted to reach patient, unable to leave message. Team will try again later. If patient calls back. Please relay message below.     Effie Gallo MA

## 2020-05-06 NOTE — TELEPHONE ENCOUNTER
More than 2 sprays is not more effective.  If he needs additional allergy relief, he should schedule telephone/video visit appointment to discuss with his primary care provider.

## 2020-05-06 NOTE — TELEPHONE ENCOUNTER
Fax received from pharmacy. States pt is using more than directed on script. States pt is doing 4 sprays in each nostril daily. Need new rx sent with change of directions if appropriate. Please advise in providers absence. Martha Montiel, CMA

## 2020-05-07 NOTE — TELEPHONE ENCOUNTER
2nd attempt, unable to leave message. Please relay message below to patient. Mychart message sent as well.     Effie Gallo MA

## 2020-08-06 ENCOUNTER — TELEPHONE (OUTPATIENT)
Dept: FAMILY MEDICINE | Facility: CLINIC | Age: 60
End: 2020-08-06

## 2020-08-06 DIAGNOSIS — I10 HYPERTENSION GOAL BP (BLOOD PRESSURE) < 140/90: ICD-10-CM

## 2020-08-06 RX ORDER — AMLODIPINE BESYLATE 10 MG/1
TABLET ORAL
Qty: 45 TABLET | Refills: 0 | Status: SHIPPED | OUTPATIENT
Start: 2020-08-06 | End: 2020-11-18

## 2020-08-06 NOTE — TELEPHONE ENCOUNTER
Kiel Solomon MD  [facility] 27 minutes ago (12:07 PM)       Due for a hypertension or annual physical    Routing comment

## 2020-08-06 NOTE — TELEPHONE ENCOUNTER
Routing refill request to provider for review/approval because:  Elevated Blood Pressures.     Lorin Diana RN

## 2020-08-07 NOTE — TELEPHONE ENCOUNTER
Called pt and he states he does not want to schedule an appt right now. States it is a busy time for him.

## 2020-11-16 DIAGNOSIS — I10 HYPERTENSION GOAL BP (BLOOD PRESSURE) < 140/90: ICD-10-CM

## 2020-11-17 NOTE — TELEPHONE ENCOUNTER
Routing to schedulers to set up F2F appointment for patient for yearly.    Routing refill request to provider for review/approval because:  Labs out of range:  BP  Labs not current:  BP, Cr, K  Patient needs to be seen because it has been more than 1 year since last office visit.    Last Written Prescription Date:  8/15/19  Last Fill Quantity: 90,  # refills: 1   Last office visit: 8/15/2019 with prescribing provider:     Future Office Visit:      MAT SuttonN, RN

## 2020-11-18 RX ORDER — HYDROCHLOROTHIAZIDE 25 MG/1
25 TABLET ORAL DAILY
Qty: 30 TABLET | Refills: 0 | Status: SHIPPED | OUTPATIENT
Start: 2020-11-18 | End: 2021-04-15

## 2020-11-18 NOTE — TELEPHONE ENCOUNTER
Patient does not feel it is necessary to have this visit because he has been taking the medication for years. He will get back to us.  Thank you,  Keshia Ramsey   for [facility]

## 2020-11-19 ENCOUNTER — OFFICE VISIT (OUTPATIENT)
Dept: SURGERY | Facility: CLINIC | Age: 60
End: 2020-11-19
Payer: COMMERCIAL

## 2020-11-19 ENCOUNTER — TELEPHONE (OUTPATIENT)
Dept: FAMILY MEDICINE | Facility: CLINIC | Age: 60
End: 2020-11-19

## 2020-11-19 VITALS
DIASTOLIC BLOOD PRESSURE: 84 MMHG | BODY MASS INDEX: 38.8 KG/M2 | HEIGHT: 70 IN | WEIGHT: 271 LBS | SYSTOLIC BLOOD PRESSURE: 146 MMHG | TEMPERATURE: 96.9 F

## 2020-11-19 DIAGNOSIS — M62.08 DIASTASIS RECTI: ICD-10-CM

## 2020-11-19 DIAGNOSIS — K43.9 ABDOMINAL WALL HERNIA: Primary | ICD-10-CM

## 2020-11-19 PROCEDURE — 99203 OFFICE O/P NEW LOW 30 MIN: CPT | Performed by: SURGERY

## 2020-11-19 ASSESSMENT — MIFFLIN-ST. JEOR: SCORE: 2047.56

## 2020-11-19 NOTE — PROGRESS NOTES
"Patient seen in consultation for abdominal wall hernia by Alistair Martinez    HPI:  Patient is a 60 year old male with several year history of abdominal wall bulging. He thinks that he felt a \"tear\" at some point when the bulge worsened. He is also having some lower abdominal wall pain at the belt line that does not seem to correlate with urination or defecation. He has gained 30+ pounds in last couple of years. He previously had open primary repair of umbilical hernia in 2004.     Review Of Systems    Skin: negative  Ears/Nose/Throat: negative  Respiratory: No shortness of breath, dyspnea on exertion, cough, or hemoptysis  Cardiovascular: negative  Gastrointestinal: as above  Genitourinary: negative  Musculoskeletal: negative  Neurologic: negative  Hematologic/Lymphatic/Immunologic: negative  Endocrine: negative      Past Medical History:   Diagnosis Date     Adenomatous polyp of colon 9/2013    q 5 yr colonoscopy     Bell's palsy 1/17/2007     Deviated septum 9/17/2013    See CT scan     Hypertension      LVH (left ventricular hypertrophy) 6/7/2011    see stress echo     NI (obstructive sleep apnea) 2016    moderate - dental appliance     Sensory polyneuropathy 2014    feet - see neuro consult     VENTRAL HERNIA NEC 6/19/2007       Past Surgical History:   Procedure Laterality Date     COLONOSCOPY  9/30/2013    Procedure: COMBINED COLONOSCOPY, SINGLE BIOPSY/POLYPECTOMY BY BIOPSY;  colonoscopy, polypectomy by biopsy;  Surgeon: Pedro Chris MD;  [facility]     COLONOSCOPY N/A 8/7/2017    Procedure: COLONOSCOPY;  colonoscopy;  Surgeon: Kiel Solomon MD;  Location: [facility]     EXCISE MASS HEAD  11/18/2013    Procedure: EXCISE MASS HEAD;  Excision of left forehead mass;  Surgeon: Pelon Echavarria MD;  [facility]     HC REPAIR UMBILICAL YVONNE,5+Y/O, INCARCERATED OR STRANGULATED  6/9/2004     LASER YAG CAPSULOTOMY Left 6/2/2016    Procedure: LASER YAG CAPSULOTOMY;  Surgeon: Joss Raza MD; "  [facility]     PHACOEMULSIFICATION WITH STANDARD INTRAOCULAR LENS IMPLANT Left 11/5/2015    Procedure: PHACOEMULSIFICATION WITH STANDARD INTRAOCULAR LENS IMPLANT;  Surgeon: Joss Raza MD;  [facility]       Family History   Problem Relation Age of Onset     Diabetes Father      Hypertension Father      Heart Disease Father         double bypass-smoker     Cancer Father         lung, brain     Hypertension Brother      Hypertension Mother      Unknown/Adopted Maternal Grandmother      Unknown/Adopted Maternal Grandfather      Unknown/Adopted Paternal Grandmother      Unknown/Adopted Paternal Grandfather        Social History     Socioeconomic History     Marital status:      Spouse name: Not on file     Number of children: Not on file     Years of education: Not on file     Highest education level: Not on file   Occupational History     Not on file   Social Needs     Financial resource strain: Not on file     Food insecurity     Worry: Not on file     Inability: Not on file     Transportation needs     Medical: Not on file     Non-medical: Not on file   Tobacco Use     Smoking status: Never Smoker     Smokeless tobacco: Never Used   Substance and Sexual Activity     Alcohol use: Yes     Alcohol/week: 0.0 standard drinks     Comment: 24 beers per week     Drug use: No     Sexual activity: Yes     Partners: Female     Comment: single, 1 child, work - self employed   Lifestyle     Physical activity     Days per week: Not on file     Minutes per session: Not on file     Stress: Not on file   Relationships     Social connections     Talks on phone: Not on file     Gets together: Not on file     Attends Denominational service: Not on file     Active member of club or organization: Not on file     Attends meetings of clubs or organizations: Not on file     Relationship status: Not on file     Intimate partner violence     Fear of current or ex partner: Not on file     Emotionally abused: Not on file  "    Physically abused: Not on file     Forced sexual activity: Not on file   Other Topics Concern     Parent/sibling w/ CABG, MI or angioplasty before 65F 55M? Not Asked   Social History Narrative     Not on file       Current Outpatient Medications   Medication Sig Dispense Refill     amLODIPine (NORVASC) 10 MG tablet Take 1/2 (one-half) tablet by mouth once daily 15 tablet 0     aspirin (ASPIRIN LOW DOSE) 81 MG tablet Take 1 tablet by mouth daily.       Cyanocobalamin (B-12 PO)        fluticasone (FLONASE) 50 MCG/ACT nasal spray Spray 2 sprays into both nostrils daily 16 g 11     GARLIC-X PO        Glucosamine-Chondroitin (GLUCOSAMINE CHONDR COMPLEX PO) Take  by mouth.       hydrochlorothiazide (HYDRODIURIL) 25 MG tablet Take 1 tablet (25 mg) by mouth daily Appointment needed for additional refills. 30 tablet 0     hydrochlorothiazide (HYDRODIURIL) 25 MG tablet Take 1 tablet (25 mg) by mouth daily 90 tablet 0     MAGNESIUM CITRATE PO        methylPREDNISolone (MEDROL DOSEPAK) 4 MG tablet therapy pack Follow package directions. 21 tablet 0     nitroGLYcerin (NITROSTAT) 0.4 MG sublingual tablet For chest pain place 1 tablet under the tongue every 5 minutes for 3 doses. If symptoms persist 5 minutes after 1st dose call 911. 25 tablet 0     Omega-3 Fatty Acids (FISH OIL CONCENTRATE PO)        order for DME Equipment ordered: RESMED Auto PAP Mask type: Full face  Settings: 5-15 cm H2O       ranitidine (ZANTAC) 150 MG capsule Take 1 capsule (150 mg) by mouth 2 times daily 180 capsule 3     Saw Palmetto, Serenoa repens, (SAW PALMETTO CONCENTRATE OR)        Thiamine HCl (VITAMIN B-1 PO)          Medications and history reviewed    Physical exam:  Vitals: BP (!) 146/84   Temp 96.9  F (36.1  C) (Temporal)   Ht 1.765 m (5' 9.5\")   Wt 122.9 kg (271 lb)   BMI 39.45 kg/m    BMI= Body mass index is 39.45 kg/m .    Constitutional: Healthy, alert, non-distressed   Head: Normo-cephalic, atraumatic, no lesions, masses or " tenderness   Cardiovascular: RRR, no new murmurs, +S1, +S2 heart sounds, no clicks, rubs or gallops   Respiratory: CTAB, no rales, rhonchi or wheezing, equal chest rise, good respiratory effort   Gastrointestinal: Soft, mildly tender in the supraumbilical area when palpating an area of possible hernia. He has significant diastasis but centrally there might be a small hernia defect, difficult to tell due to body habitus, non distended, no rebound rigidity or guarding, Probable left inguinal hernia as well with bulge with valsalva.  : Deferred  Musculoskeletal: Moves all extremities, normal  strength, no deformities noted   Skin: No suspicious lesions or rashes   Psychiatric: Mentation appears normal, affect appropriate   Hematologic/Lymphatic/Immunologic: Normal cervical and supraclavicular lymph nodes   Patient able to get up on table with mild difficulty.    Labs show:  No results found for this or any previous visit (from the past 24 hour(s)).    Imaging shows:  CT from 2015 reviewed    Assessment:     ICD-10-CM    1. Abdominal wall hernia  K43.9    2. Diastasis recti  M62.08    3. Probable left inguinal hernia    Plan: Eventually Jeevan would  Benefit from surgical repair oh his hernias. I recommended weight loss attempt first to lessen the christoph-operative risk and risk of recurrence. He is currently BMI 39.9 so I recommended at last 30lb weight loss prior to discussion about surgery. I also would get a CT for operative planning at some point due to his personal history of hernia repair and with possible multiple hernias. He understands the plan and will call in 3-6 months with update and possible return to clinic for discussion pending his success with weight loss    Clif Lua, DO    Jeevan to follow up with Primary Care provider regarding elevated blood pressure.

## 2020-11-19 NOTE — TELEPHONE ENCOUNTER
Central Prior Authorization Team   Phone: [phone]      PA Initiation    Medication: amLODIPine (NORVASC) 10 MG tablet  Insurance Company: [employer] - Phone [phone] Fax [phone]  Pharmacy Filling the Rx: [facility] [address] [place] [address] [address]  Filling Pharmacy Phone: [phone]  Filling Pharmacy Fax:    Start Date: 11/19/2020

## 2020-11-19 NOTE — TELEPHONE ENCOUNTER
Prior Authorization Not Needed per Insurance    Medication: amLODIPine (NORVASC) 10 MG tablet  Insurance Company: [employer] - Phone [phone] Fax [phone]  Expected CoPay:      Pharmacy Filling the Rx: [facility] [address] [place] [address]  Pharmacy Notified: Yes  Patient Notified: Yes  **Instructed pharmacy to notify patient when script is ready to /ship.**

## 2020-11-19 NOTE — LETTER
"    11/19/2020         RE: Jeevan Lopez  [address] [facility] [address]        Dear Colleague,    Thank you for referring your patient, Jeevan Lopez, to the [facility]. Please see a copy of my visit note below.    Patient seen in consultation for abdominal wall hernia by Alistair Martinez    HPI:  Patient is a 60 year old male with several year history of abdominal wall bulging. He thinks that he felt a \"tear\" at some point when the bulge worsened. He is also having some lower abdominal wall pain at the belt line that does not seem to correlate with urination or defecation. He has gained 30+ pounds in last couple of years. He previously had open primary repair of umbilical hernia in 2004.     Review Of Systems    Skin: negative  Ears/Nose/Throat: negative  Respiratory: No shortness of breath, dyspnea on exertion, cough, or hemoptysis  Cardiovascular: negative  Gastrointestinal: as above  Genitourinary: negative  Musculoskeletal: negative  Neurologic: negative  Hematologic/Lymphatic/Immunologic: negative  Endocrine: negative      Past Medical History:   Diagnosis Date     Adenomatous polyp of colon 9/2013    q 5 yr colonoscopy     Bell's palsy 1/17/2007     Deviated septum 9/17/2013    See CT scan     Hypertension      LVH (left ventricular hypertrophy) 6/7/2011    see stress echo     NI (obstructive sleep apnea) 2016    moderate - dental appliance     Sensory polyneuropathy 2014    feet - see neuro consult     VENTRAL HERNIA NEC 6/19/2007       Past Surgical History:   Procedure Laterality Date     COLONOSCOPY  9/30/2013    Procedure: COMBINED COLONOSCOPY, SINGLE BIOPSY/POLYPECTOMY BY BIOPSY;  colonoscopy, polypectomy by biopsy;  Surgeon: Pedro Chris MD;  [facility]     COLONOSCOPY N/A 8/7/2017    Procedure: COLONOSCOPY;  colonoscopy;  Surgeon: Kiel Solomon MD;  Location:  [facility]     EXCISE MASS HEAD  11/18/2013    Procedure: EXCISE MASS HEAD;  Excision of left " forehead mass;  Surgeon: Pelon Echavarria MD;  [facility]     HC REPAIR UMBILICAL YVONNE,5+Y/O, INCARCERATED OR STRANGULATED  6/9/2004     LASER YAG CAPSULOTOMY Left 6/2/2016    Procedure: LASER YAG CAPSULOTOMY;  Surgeon: Joss Raza MD;  [facility]     PHACOEMULSIFICATION WITH STANDARD INTRAOCULAR LENS IMPLANT Left 11/5/2015    Procedure: PHACOEMULSIFICATION WITH STANDARD INTRAOCULAR LENS IMPLANT;  Surgeon: Joss Raza MD;  [facility]       Family History   Problem Relation Age of Onset     Diabetes Father      Hypertension Father      Heart Disease Father         double bypass-smoker     Cancer Father         lung, brain     Hypertension Brother      Hypertension Mother      Unknown/Adopted Maternal Grandmother      Unknown/Adopted Maternal Grandfather      Unknown/Adopted Paternal Grandmother      Unknown/Adopted Paternal Grandfather        Social History     Socioeconomic History     Marital status:      Spouse name: Not on file     Number of children: Not on file     Years of education: Not on file     Highest education level: Not on file   Occupational History     Not on file   Social Needs     Financial resource strain: Not on file     Food insecurity     Worry: Not on file     Inability: Not on file     Transportation needs     Medical: Not on file     Non-medical: Not on file   Tobacco Use     Smoking status: Never Smoker     Smokeless tobacco: Never Used   Substance and Sexual Activity     Alcohol use: Yes     Alcohol/week: 0.0 standard drinks     Comment: 24 beers per week     Drug use: No     Sexual activity: Yes     Partners: Female     Comment: single, 1 child, work - self employed   Lifestyle     Physical activity     Days per week: Not on file     Minutes per session: Not on file     Stress: Not on file   Relationships     Social connections     Talks on phone: Not on file     Gets together: Not on file     Attends Uatsdin service: Not on file     Active  member of club or organization: Not on file     Attends meetings of clubs or organizations: Not on file     Relationship status: Not on file     Intimate partner violence     Fear of current or ex partner: Not on file     Emotionally abused: Not on file     Physically abused: Not on file     Forced sexual activity: Not on file   Other Topics Concern     Parent/sibling w/ CABG, MI or angioplasty before 65F 55M? Not Asked   Social History Narrative     Not on file       Current Outpatient Medications   Medication Sig Dispense Refill     amLODIPine (NORVASC) 10 MG tablet Take 1/2 (one-half) tablet by mouth once daily 15 tablet 0     aspirin (ASPIRIN LOW DOSE) 81 MG tablet Take 1 tablet by mouth daily.       Cyanocobalamin (B-12 PO)        fluticasone (FLONASE) 50 MCG/ACT nasal spray Spray 2 sprays into both nostrils daily 16 g 11     GARLIC-X PO        Glucosamine-Chondroitin (GLUCOSAMINE CHONDR COMPLEX PO) Take  by mouth.       hydrochlorothiazide (HYDRODIURIL) 25 MG tablet Take 1 tablet (25 mg) by mouth daily Appointment needed for additional refills. 30 tablet 0     hydrochlorothiazide (HYDRODIURIL) 25 MG tablet Take 1 tablet (25 mg) by mouth daily 90 tablet 0     MAGNESIUM CITRATE PO        methylPREDNISolone (MEDROL DOSEPAK) 4 MG tablet therapy pack Follow package directions. 21 tablet 0     nitroGLYcerin (NITROSTAT) 0.4 MG sublingual tablet For chest pain place 1 tablet under the tongue every 5 minutes for 3 doses. If symptoms persist 5 minutes after 1st dose call 911. 25 tablet 0     Omega-3 Fatty Acids (FISH OIL CONCENTRATE PO)        order for DME Equipment ordered: RESMED Auto PAP Mask type: Full face  Settings: 5-15 cm H2O       ranitidine (ZANTAC) 150 MG capsule Take 1 capsule (150 mg) by mouth 2 times daily 180 capsule 3     Saw Palmetto, Serenoa repens, (SAW PALMETTO CONCENTRATE OR)        Thiamine HCl (VITAMIN B-1 PO)          Medications and history reviewed    Physical exam:  Vitals: BP (!) 146/84    "Temp 96.9  F (36.1  C) (Temporal)   Ht 1.765 m (5' 9.5\")   Wt 122.9 kg (271 lb)   BMI 39.45 kg/m    BMI= Body mass index is 39.45 kg/m .    Constitutional: Healthy, alert, non-distressed   Head: Normo-cephalic, atraumatic, no lesions, masses or tenderness   Cardiovascular: RRR, no new murmurs, +S1, +S2 heart sounds, no clicks, rubs or gallops   Respiratory: CTAB, no rales, rhonchi or wheezing, equal chest rise, good respiratory effort   Gastrointestinal: Soft, mildly tender in the supraumbilical area when palpating an area of possible hernia. He has significant diastasis but centrally there might be a small hernia defect, difficult to tell due to body habitus, non distended, no rebound rigidity or guarding, Probable left inguinal hernia as well with bulge with valsalva.  : Deferred  Musculoskeletal: Moves all extremities, normal  strength, no deformities noted   Skin: No suspicious lesions or rashes   Psychiatric: Mentation appears normal, affect appropriate   Hematologic/Lymphatic/Immunologic: Normal cervical and supraclavicular lymph nodes   Patient able to get up on table with mild difficulty.    Labs show:  No results found for this or any previous visit (from the past 24 hour(s)).    Imaging shows:  CT from 2015 reviewed    Assessment:     ICD-10-CM    1. Abdominal wall hernia  K43.9    2. Diastasis recti  M62.08    3. Probable left inguinal hernia    Plan: Eventually Jeevan would  Benefit from surgical repair oh his hernias. I recommended weight loss attempt first to lessen the christoph-operative risk and risk of recurrence. He is currently BMI 39.9 so I recommended at last 30lb weight loss prior to discussion about surgery. I also would get a CT for operative planning at some point due to his personal history of hernia repair and with possible multiple hernias. He understands the plan and will call in 3-6 months with update and possible return to clinic for discussion pending his success with weight " DO Jeevan Strickland to follow up with Primary Care provider regarding elevated blood pressure.        Again, thank you for allowing me to participate in the care of your patient.        Sincerely,        Clif Lua DO

## 2020-11-19 NOTE — TELEPHONE ENCOUNTER
Prior Authorization Retail Medication Request    Medication/Dose: amLODIPine (NORVASC) 10 MG tablet    KEY: [id number]  ICD code (if different than what is on RX):    Previously Tried and Failed:    Rationale:      Insurance Name:  [facility] [employer] WITH  (Managed Care)  Insurance ID:         [id number]      Pharmacy Information (if different than what is on RX)  Name:    Phone:

## 2020-11-27 DIAGNOSIS — I10 HYPERTENSION GOAL BP (BLOOD PRESSURE) < 140/90: ICD-10-CM

## 2020-11-27 NOTE — TELEPHONE ENCOUNTER
Routing refill request to provider for review/approval because:  Labs not current:  Creatinine.   Patient needs to be seen because it has been more than 1 year since last office visit.  Elevated blood pressures above goal.     Lorin Diana RN

## 2020-11-29 RX ORDER — AMLODIPINE BESYLATE 10 MG/1
TABLET ORAL
Qty: 90 TABLET | Refills: 1 | OUTPATIENT
Start: 2020-11-29

## 2020-11-29 NOTE — TELEPHONE ENCOUNTER
Refilled on 11/8/2020 with one month supply. Need follow up apt for further refill.  Recommended physical.

## 2021-01-09 ENCOUNTER — HEALTH MAINTENANCE LETTER (OUTPATIENT)
Age: 61
End: 2021-01-09

## 2021-04-07 ENCOUNTER — OFFICE VISIT (OUTPATIENT)
Dept: UROLOGY | Facility: CLINIC | Age: 61
End: 2021-04-07
Payer: COMMERCIAL

## 2021-04-07 VITALS
TEMPERATURE: 97.8 F | SYSTOLIC BLOOD PRESSURE: 136 MMHG | WEIGHT: 271 LBS | DIASTOLIC BLOOD PRESSURE: 82 MMHG | HEIGHT: 70 IN | BODY MASS INDEX: 38.8 KG/M2

## 2021-04-07 DIAGNOSIS — R10.2 PELVIC PAIN IN MALE: Primary | ICD-10-CM

## 2021-04-07 DIAGNOSIS — N40.1 BENIGN PROSTATIC HYPERPLASIA WITH LOWER URINARY TRACT SYMPTOMS, SYMPTOM DETAILS UNSPECIFIED: ICD-10-CM

## 2021-04-07 LAB
ALBUMIN UR-MCNC: NEGATIVE MG/DL
APPEARANCE UR: CLEAR
BILIRUB UR QL STRIP: NEGATIVE
COLOR UR AUTO: NORMAL
GLUCOSE UR STRIP-MCNC: NEGATIVE MG/DL
HGB UR QL STRIP: NEGATIVE
KETONES UR STRIP-MCNC: NEGATIVE MG/DL
LEUKOCYTE ESTERASE UR QL STRIP: NEGATIVE
NITRATE UR QL: NEGATIVE
PH UR STRIP: 5 PH (ref 5–7)
PSA SERPL-MCNC: 1.01 UG/L (ref 0–4)
SOURCE: NORMAL
SP GR UR STRIP: 1.01 (ref 1–1.03)
UROBILINOGEN UR STRIP-MCNC: 0 MG/DL (ref 0–2)

## 2021-04-07 PROCEDURE — 81003 URINALYSIS AUTO W/O SCOPE: CPT | Performed by: UROLOGY

## 2021-04-07 PROCEDURE — 36415 COLL VENOUS BLD VENIPUNCTURE: CPT | Performed by: UROLOGY

## 2021-04-07 PROCEDURE — 84153 ASSAY OF PSA TOTAL: CPT | Performed by: UROLOGY

## 2021-04-07 PROCEDURE — 51798 US URINE CAPACITY MEASURE: CPT | Performed by: UROLOGY

## 2021-04-07 PROCEDURE — 99203 OFFICE O/P NEW LOW 30 MIN: CPT | Mod: 25 | Performed by: UROLOGY

## 2021-04-07 ASSESSMENT — MIFFLIN-ST. JEOR: SCORE: 2047.56

## 2021-04-07 NOTE — PROGRESS NOTES
S:-Patient is a 60 year-old male who was seen the office today for a consultation with regard to patient's pelvic discomfort.  For this past year patient has had intermittent pain in his pelvic area at the belt line.  Pain can be mild to moderate in severity.  There are no obvious aggravating or relieving factors.  Patient has no bowel issues.  Patient has mild BPH symptoms.  His AUA symptom score is 14 with a QOL of 3.  He denies any dysuria or hematuria.  His discomfort has since resolved lately however.  Since patient has negative colonoscopy recently he thinks it may be prostate about related.  Patient does biking routinely.  Current Outpatient Medications   Medication Sig Dispense Refill     amLODIPine (NORVASC) 10 MG tablet Take 1/2 (one-half) tablet by mouth once daily 15 tablet 0     aspirin (ASPIRIN LOW DOSE) 81 MG tablet Take 1 tablet by mouth daily.       Cyanocobalamin (B-12 PO)        fluticasone (FLONASE) 50 MCG/ACT nasal spray Spray 2 sprays into both nostrils daily 16 g 11     GARLIC-X PO        Glucosamine-Chondroitin (GLUCOSAMINE CHONDR COMPLEX PO) Take  by mouth.       hydrochlorothiazide (HYDRODIURIL) 25 MG tablet Take 1 tablet (25 mg) by mouth daily Appointment needed for additional refills. 30 tablet 0     hydrochlorothiazide (HYDRODIURIL) 25 MG tablet Take 1 tablet (25 mg) by mouth daily 90 tablet 0     MAGNESIUM CITRATE PO        methylPREDNISolone (MEDROL DOSEPAK) 4 MG tablet therapy pack Follow package directions. 21 tablet 0     nitroGLYcerin (NITROSTAT) 0.4 MG sublingual tablet For chest pain place 1 tablet under the tongue every 5 minutes for 3 doses. If symptoms persist 5 minutes after 1st dose call 911. 25 tablet 0     Omega-3 Fatty Acids (FISH OIL CONCENTRATE PO)        order for DME Equipment ordered: RESMED Auto PAP Mask type: Full face  Settings: 5-15 cm H2O       ranitidine (ZANTAC) 150 MG capsule Take 1 capsule (150 mg) by mouth 2 times daily 180 capsule 3     Saw [place],  Serenoa repens, (SAW PALMETTO CONCENTRATE OR)        Thiamine HCl (VITAMIN B-1 PO)        Allergies   Allergen Reactions     Lisinopril Other (See Comments)     Dry hacky cough     Past Medical History:   Diagnosis Date     Adenomatous polyp of colon 9/2013    q 5 yr colonoscopy     Bell's palsy 1/17/2007     Deviated septum 9/17/2013    See CT scan     Hypertension      LVH (left ventricular hypertrophy) 6/7/2011    see stress echo     NI (obstructive sleep apnea) 2016    moderate - dental appliance     Sensory polyneuropathy 2014    feet - see neuro consult     VENTRAL HERNIA NEC 6/19/2007     Past Surgical History:   Procedure Laterality Date     COLONOSCOPY  9/30/2013    Procedure: COMBINED COLONOSCOPY, SINGLE BIOPSY/POLYPECTOMY BY BIOPSY;  colonoscopy, polypectomy by biopsy;  Surgeon: Pedro Chris MD;  Location: [facility]     COLONOSCOPY N/A 8/7/2017    Procedure: COLONOSCOPY;  colonoscopy;  Surgeon: Kiel Solomon MD;  Location: [facility]     EXCISE MASS HEAD  11/18/2013    Procedure: EXCISE MASS HEAD;  Excision of left forehead mass;  Surgeon: Pelon Echavarria MD;  Location: [facility]     HC REPAIR UMBILICAL YVONNE,5+Y/O, INCARCERATED OR STRANGULATED  6/9/2004     LASER YAG CAPSULOTOMY Left 6/2/2016    Procedure: LASER YAG CAPSULOTOMY;  Surgeon: Joss Raza MD;  Location: [facility]     PHACOEMULSIFICATION WITH STANDARD INTRAOCULAR LENS IMPLANT Left 11/5/2015    Procedure: PHACOEMULSIFICATION WITH STANDARD INTRAOCULAR LENS IMPLANT;  Surgeon: Joss Raza MD;  Location: [facility]      Family History   Problem Relation Age of Onset     Diabetes Father      Hypertension Father      Heart Disease Father         double bypass-smoker     Cancer Father         lung, brain     Hypertension Brother      Hypertension Mother      Unknown/Adopted Maternal Grandmother      Unknown/Adopted Maternal Grandfather      Unknown/Adopted Paternal Grandmother      Unknown/Adopted Paternal Grandfather      Social  History     Socioeconomic History     Marital status:      Spouse name: Not on file     Number of children: Not on file     Years of education: Not on file     Highest education level: Not on file   Occupational History     Not on file   Social Needs     Financial resource strain: Not on file     Food insecurity     Worry: Not on file     Inability: Not on file     Transportation needs     Medical: Not on file     Non-medical: Not on file   Tobacco Use     Smoking status: Never Smoker     Smokeless tobacco: Never Used   Substance and Sexual Activity     Alcohol use: Yes     Alcohol/week: 0.0 standard drinks     Comment: 24 beers per week     Drug use: No     Sexual activity: Yes     Partners: Female     Comment: single, 1 child, work - self employed   Lifestyle     Physical activity     Days per week: Not on file     Minutes per session: Not on file     Stress: Not on file   Relationships     Social connections     Talks on phone: Not on file     Gets together: Not on file     Attends Latter day service: Not on file     Active member of club or organization: Not on file     Attends meetings of clubs or organizations: Not on file     Relationship status: Not on file     Intimate partner violence     Fear of current or ex partner: Not on file     Emotionally abused: Not on file     Physically abused: Not on file     Forced sexual activity: Not on file   Other Topics Concern     Parent/sibling w/ CABG, MI or angioplasty before 65F 55M? Not Asked   Social History Narrative     Not on file       REVIEW OF SYSTEMS  =================  C: NEGATIVE for fever, chills, change in weight  I: NEGATIVE for worrisome rashes, moles or lesions  E/M: NEGATIVE for ear, mouth and throat problems  R: NEGATIVE for significant cough or SHORTNESS OF BREATH  CV:  NEGATIVE for chest pain, palpitations or peripheral edema  GI: NEGATIVE for nausea, abdominal pain, heartburn, or change in bowel habits  NEURO: NEGATIVE  "numbness/weakness  : see HPI  PSYCH: NEGATIVE depression/anxiety  LYmph: no new enlarged lymph nodes  Ortho: no new trauma/movements      Physical Exam:  Blood pressure 136/82, temperature 97.8  F (36.6  C), temperature source Temporal, height 1.765 m (5' 9.5\"), weight 122.9 kg (271 lb).    Patient is pleasant, in no acute distress, good general condition.  Heart:  negative, PMI normal  Lung: no evidence of respiratory distress    Abdomen: Soft, nondistended, non tender. No masses. No rebound or guarding.   Exam: penis no discharge. Testis no masses.  No scrotal skin lesion.  Prostate 40 gm smooth non tender.  PVR 75 ml  Skin: Warm and dry.  No redness.  Neuro: grossly normal  Musculaskeletal: moving all extremities  Psych normal mood and affect  Musculoskeletal  moving all extremities  Hematologic/Lymphatic/Immunologic: normal ant/post cervical, axillary, supraclavicular and inguinal nodes    Assessment/Plan:   Patient is a 60-year-old male with pelvic discomfort for this past year which has resolved along with mild lower urinary tract symptoms.  I will obtain a UA and also PSA today.  Given his symptoms however most likely it is due to pelvic muscle injury.  Patient was reassured.       "

## 2021-04-14 ENCOUNTER — TELEPHONE (OUTPATIENT)
Dept: FAMILY MEDICINE | Facility: CLINIC | Age: 61
End: 2021-04-14

## 2021-04-14 DIAGNOSIS — I10 HYPERTENSION GOAL BP (BLOOD PRESSURE) < 140/90: ICD-10-CM

## 2021-04-14 NOTE — TELEPHONE ENCOUNTER
Reason for call:  Patient reporting a symptom    Symptom or request: elevated BP after the moderna vaccine    Duration (how long have symptoms been present): 1 month ago with adia sorto taking care of mom     Have you been treated for this before? Yes    Additional comments: 170-105 still feeling flush after the vaccine     Phone Number patient can be reached at:  Cell number on file:    Telephone Information:   Mobile [phone]       Best Time:  anytime    Can we leave a detailed message on this number:  YES    Call taken on 4/14/2021 at 11:41 AM by Sherry Shields      Patient is wondering if he can get a refill of his medication

## 2021-04-15 ENCOUNTER — ALLIED HEALTH/NURSE VISIT (OUTPATIENT)
Dept: FAMILY MEDICINE | Facility: CLINIC | Age: 61
End: 2021-04-15
Payer: COMMERCIAL

## 2021-04-15 VITALS — DIASTOLIC BLOOD PRESSURE: 90 MMHG | SYSTOLIC BLOOD PRESSURE: 144 MMHG | HEART RATE: 66 BPM

## 2021-04-15 DIAGNOSIS — I10 HYPERTENSION GOAL BP (BLOOD PRESSURE) < 140/90: Primary | ICD-10-CM

## 2021-04-15 PROCEDURE — 99207 PR NO CHARGE NURSE ONLY: CPT

## 2021-04-15 RX ORDER — HYDROCHLOROTHIAZIDE 25 MG/1
25 TABLET ORAL DAILY
Qty: 30 TABLET | Refills: 0 | Status: SHIPPED | OUTPATIENT
Start: 2021-04-15 | End: 2021-04-20

## 2021-04-15 RX ORDER — AMLODIPINE BESYLATE 10 MG/1
TABLET ORAL
Qty: 15 TABLET | Refills: 0 | Status: SHIPPED | OUTPATIENT
Start: 2021-04-15 | End: 2021-04-20

## 2021-04-15 NOTE — TELEPHONE ENCOUNTER
Has not been seen since August 2019.  Must follow-up before the medication runs out.  Recommend to follow-up as a physical.

## 2021-04-15 NOTE — PROGRESS NOTES
Jeevan Lopez is a 60 year old patient who comes in today for a Blood Pressure check.  Initial BP:  BP (!) 144/90   Pulse 66   Second: 160/94 Third: 144/90     Disposition: results routed to provider    Patient BP was checked, sat for 10 min but continued talking with me as we scheduled appointment for med check (see telephone encounter), so second BP was elevated more so. I then had patient sit for 10 more minutes and rechecked and it was back down to 144/90. He is not taking any BP meds - I told him to go pick those up from the pharmacy and I would route this to Dr. Martinez.    Appt scheduled for 4/20 at 8:20am - patient only wanted to do med check vs a physical.    Kimberly Read on 4/15/2021 at 11:04 AM

## 2021-04-20 ENCOUNTER — OFFICE VISIT (OUTPATIENT)
Dept: FAMILY MEDICINE | Facility: CLINIC | Age: 61
End: 2021-04-20
Payer: COMMERCIAL

## 2021-04-20 VITALS
HEIGHT: 70 IN | SYSTOLIC BLOOD PRESSURE: 142 MMHG | DIASTOLIC BLOOD PRESSURE: 92 MMHG | RESPIRATION RATE: 18 BRPM | WEIGHT: 267 LBS | HEART RATE: 88 BPM | TEMPERATURE: 97.1 F | BODY MASS INDEX: 38.22 KG/M2 | OXYGEN SATURATION: 97 %

## 2021-04-20 DIAGNOSIS — I10 HYPERTENSION GOAL BP (BLOOD PRESSURE) < 140/90: Primary | ICD-10-CM

## 2021-04-20 DIAGNOSIS — K21.00 GASTROESOPHAGEAL REFLUX DISEASE WITH ESOPHAGITIS WITHOUT HEMORRHAGE: ICD-10-CM

## 2021-04-20 DIAGNOSIS — E78.5 HYPERLIPIDEMIA LDL GOAL <130: ICD-10-CM

## 2021-04-20 DIAGNOSIS — G47.33 OSA (OBSTRUCTIVE SLEEP APNEA): ICD-10-CM

## 2021-04-20 LAB
ALBUMIN SERPL-MCNC: 3.5 G/DL (ref 3.4–5)
ALP SERPL-CCNC: 79 U/L (ref 40–150)
ALT SERPL W P-5'-P-CCNC: 16 U/L (ref 0–70)
ANION GAP SERPL CALCULATED.3IONS-SCNC: 2 MMOL/L (ref 3–14)
AST SERPL W P-5'-P-CCNC: 15 U/L (ref 0–45)
BILIRUB SERPL-MCNC: 0.6 MG/DL (ref 0.2–1.3)
BUN SERPL-MCNC: 16 MG/DL (ref 7–30)
CALCIUM SERPL-MCNC: 9.1 MG/DL (ref 8.5–10.1)
CHLORIDE SERPL-SCNC: 103 MMOL/L (ref 94–109)
CHOLEST SERPL-MCNC: 203 MG/DL
CO2 SERPL-SCNC: 33 MMOL/L (ref 20–32)
CREAT SERPL-MCNC: 0.89 MG/DL (ref 0.66–1.25)
GFR SERPL CREATININE-BSD FRML MDRD: >90 ML/MIN/{1.73_M2}
GLUCOSE SERPL-MCNC: 100 MG/DL (ref 70–99)
HDLC SERPL-MCNC: 57 MG/DL
LDLC SERPL CALC-MCNC: 125 MG/DL
NONHDLC SERPL-MCNC: 146 MG/DL
POTASSIUM SERPL-SCNC: 4.2 MMOL/L (ref 3.4–5.3)
PROT SERPL-MCNC: 7.1 G/DL (ref 6.8–8.8)
SODIUM SERPL-SCNC: 138 MMOL/L (ref 133–144)
TRIGL SERPL-MCNC: 106 MG/DL

## 2021-04-20 PROCEDURE — 80053 COMPREHEN METABOLIC PANEL: CPT | Performed by: FAMILY MEDICINE

## 2021-04-20 PROCEDURE — 99214 OFFICE O/P EST MOD 30 MIN: CPT | Performed by: FAMILY MEDICINE

## 2021-04-20 PROCEDURE — 80061 LIPID PANEL: CPT | Performed by: FAMILY MEDICINE

## 2021-04-20 PROCEDURE — 36415 COLL VENOUS BLD VENIPUNCTURE: CPT | Performed by: FAMILY MEDICINE

## 2021-04-20 RX ORDER — NICOTINE POLACRILEX 4 MG/1
20 GUM, CHEWING ORAL DAILY
COMMUNITY
End: 2021-04-20

## 2021-04-20 RX ORDER — AMLODIPINE BESYLATE 10 MG/1
10 TABLET ORAL DAILY
Qty: 90 TABLET | Refills: 1 | Status: SHIPPED | OUTPATIENT
Start: 2021-04-20 | End: 2021-12-23

## 2021-04-20 RX ORDER — HYDROCHLOROTHIAZIDE 25 MG/1
25 TABLET ORAL DAILY
Qty: 90 TABLET | Refills: 1 | Status: SHIPPED | OUTPATIENT
Start: 2021-04-20 | End: 2021-08-24

## 2021-04-20 RX ORDER — NICOTINE POLACRILEX 4 MG/1
20 GUM, CHEWING ORAL DAILY
Qty: 90 TABLET | Refills: 4 | Status: SHIPPED | OUTPATIENT
Start: 2021-04-20 | End: 2024-07-10

## 2021-04-20 ASSESSMENT — PAIN SCALES - GENERAL: PAINLEVEL: NO PAIN (0)

## 2021-04-20 ASSESSMENT — MIFFLIN-ST. JEOR: SCORE: 2029.41

## 2021-04-20 NOTE — PROGRESS NOTES
"    Assessment & Plan     Hypertension goal BP (blood pressure) < 140/90  BP is improving with medications.  Comorbidity include sleep apnea and high cholesterol.  Continue with the current doses of amlodipine and hydrochlorothiazide, been tolerating  well.  Emphasized importance of taking his medication as prescribed.  Educated him about the potential complication associated with uncontrolled high blood pressure.  Also educated about the goal for blood pressure which is less than 140/90.  Emphasized on healthy/low salt diet, daily excercise and weight loss.  Avoid high sugar/caffeine intake. Lab as ordered.  Follow up in 6 month, earlier as needed.       - amLODIPine (NORVASC) 10 MG tablet; Take 1 tablet (10 mg) by mouth daily  - Comprehensive metabolic panel (BMP + Alb, Alk Phos, ALT, AST, Total. Bili, TP)  - Lipid panel reflex to direct LDL Fasting  - hydrochlorothiazide (HYDRODIURIL) 25 MG tablet; Take 1 tablet (25 mg) by mouth daily    Gastroesophageal reflux disease with esophagitis without hemorrhage  Stable and doing well.  Continue with omeprazole as needed.  Encouraged to avoid greasy/spicy food as well as milk.  Follow-up as needed    - omeprazole 20 MG tablet; Take 1 tablet (20 mg) by mouth daily    NI (obstructive sleep apnea)  Continue with the mouthguard.  Encouraged to work on weight loss.    Hyperlipidemia LDL goal <130  Not on medication.  Cholesterol is mildly elevated today.  Emphasized on exercising, healthy diet and weight loss as mentioned above.             BMI:   Estimated body mass index is 38.86 kg/m  as calculated from the following:    Height as of this encounter: 1.765 m (5' 9.5\").    Weight as of this encounter: 121.1 kg (267 lb).   Weight management plan: Discussed healthy diet and exercise guidelines    Return in about 6 months (around 10/20/2021) for Physical Exam, folow up HTN.    Alistair Jasmine Mai, MD  [facility]LON Chew is a 60 year old who presents " for the following health issues     HPI     Hypertension Follow-up      Do you check your blood pressure regularly outside of the clinic? Yes     Are you following a low salt diet? No    Are your blood pressures ever more than 140 on the top number (systolic) OR more   than 90 on the bottom number (diastolic), for example 140/90? Yes      How many servings of fruits and vegetables do you eat daily?  0-1    On average, how many sweetened beverages do you drink each day (Examples: soda, juice, sweet tea, etc.  Do NOT count diet or artificially sweetened beverages)?   0    How many days per week do you exercise enough to make your heart beat faster? 6    How many minutes a day do you exercise enough to make your heart beat faster? 60 or more  How many days per week do you miss taking your medication? Patient has been out of medications    What makes it hard for you to take your medications?      Jeevan is here today for follow-up on his high blood pressure.  He was also recommended to be seen for a physical exam today but he declined.  He is known to have high blood pressure for several years and was on medications.  He took himself off the medications for over a year because he was feeling fine and did not feel the need for it.  Typically he did not check his blood pressure at home.  After he gets Covid vaccination couple weeks ago, he noted his blood pressure has been  Has been high.  He since the restarted the amlodipine and hydrochlorothiazide; his blood pressure dropped down nicely since then.  He now agrees that he is needed to be on medication.  No problem with taking it.  Comorbidity include morbidly obesity and sleep apnea.  Not tolerate the CPAP but been using the mouthguard and it has helped.  Denied of excessive salt intake.  No dyspnea or orthopnea.  No chest pain or shortness of breath.  started biking again in the last 6 weeks.  No leg swelling.  No other concerns today.    Review of Systems  "  Constitutional, HEENT, cardiovascular, pulmonary, gi and gu systems are negative, except as otherwise noted.      Objective    BP (!) 142/92   Pulse 88   Temp 97.1  F (36.2  C) (Temporal)   Resp 18   Ht 1.765 m (5' 9.5\")   Wt 121.1 kg (267 lb)   SpO2 97%   BMI 38.86 kg/m    Body mass index is 38.86 kg/m .  Physical Exam   GENERAL: healthy, alert and no distress   RESP: lungs clear to auscultation - no rales, rhonchi or wheezes  CV: regular rate and rhythm, normal S1 S2, no S3 or S4, no murmur, click or rub, no peripheral edema  ABDOMEN: soft, nontender, normal bowel sounds  MS: no gross musculoskeletal defects noted, no edema.  No focal weakness.  NEURO: Normal strength and tone, mentation intact and speech normal.  No focal neurological deficit    Results for orders placed or performed in visit on 04/20/21   Comprehensive metabolic panel (BMP + Alb, Alk Phos, ALT, AST, Total. Bili, TP)     Status: Abnormal   Result Value Ref Range    Sodium 138 133 - 144 mmol/L    Potassium 4.2 3.4 - 5.3 mmol/L    Chloride 103 94 - 109 mmol/L    Carbon Dioxide 33 (H) 20 - 32 mmol/L    Anion Gap 2 (L) 3 - 14 mmol/L    Glucose 100 (H) 70 - 99 mg/dL    Urea Nitrogen 16 7 - 30 mg/dL    Creatinine 0.89 0.66 - 1.25 mg/dL    GFR Estimate >90 >60 mL/min/[1.73_m2]    GFR Estimate If Black >90 >60 mL/min/[1.73_m2]    Calcium 9.1 8.5 - 10.1 mg/dL    Bilirubin Total 0.6 0.2 - 1.3 mg/dL    Albumin 3.5 3.4 - 5.0 g/dL    Protein Total 7.1 6.8 - 8.8 g/dL    Alkaline Phosphatase 79 40 - 150 U/L    ALT 16 0 - 70 U/L    AST 15 0 - 45 U/L   Lipid panel reflex to direct LDL Fasting     Status: Abnormal   Result Value Ref Range    Cholesterol 203 (H) <200 mg/dL    Triglycerides 106 <150 mg/dL    HDL Cholesterol 57 >39 mg/dL    LDL Cholesterol Calculated 125 (H) <100 mg/dL    Non HDL Cholesterol 146 (H) <130 mg/dL           "

## 2021-08-23 DIAGNOSIS — I10 HYPERTENSION GOAL BP (BLOOD PRESSURE) < 140/90: ICD-10-CM

## 2021-08-24 RX ORDER — HYDROCHLOROTHIAZIDE 25 MG/1
TABLET ORAL
Qty: 90 TABLET | Refills: 0 | Status: SHIPPED | OUTPATIENT
Start: 2021-08-24 | End: 2023-01-27

## 2021-08-24 NOTE — TELEPHONE ENCOUNTER
I recommend him to stop by to get the blood pressure check with nursing staff at his earliest convenience.  It has been high.

## 2021-08-24 NOTE — TELEPHONE ENCOUNTER
Routing refill request to provider for review/approval because:  Elevated BP on file.     Lorin Diana RN

## 2021-09-22 ENCOUNTER — VIRTUAL VISIT (OUTPATIENT)
Dept: FAMILY MEDICINE | Facility: CLINIC | Age: 61
End: 2021-09-22
Payer: COMMERCIAL

## 2021-09-22 DIAGNOSIS — R53.83 FATIGUE, UNSPECIFIED TYPE: Primary | ICD-10-CM

## 2021-09-22 DIAGNOSIS — J31.0 CHRONIC RHINITIS: ICD-10-CM

## 2021-09-22 DIAGNOSIS — J34.2 DEVIATED SEPTUM: ICD-10-CM

## 2021-09-22 DIAGNOSIS — E55.9 VITAMIN D DEFICIENCY: ICD-10-CM

## 2021-09-22 DIAGNOSIS — E66.01 MORBID OBESITY (H): ICD-10-CM

## 2021-09-22 PROCEDURE — 99214 OFFICE O/P EST MOD 30 MIN: CPT | Mod: 95 | Performed by: FAMILY MEDICINE

## 2021-09-22 NOTE — PROGRESS NOTES
Jeevan is a 60 year old who is being evaluated via a billable telephone visit.      What phone number would you like to be contacted at? [phone]  How would you like to obtain your AVS? MyChart    Assessment & Plan     Fatigue, unspecified type  Will obtain these labs along with a vitamin D level.  It sounds more like a sleep issue.  He is supposed to be on CPAP uses it occasionally.  He states he wakes up hourly at night.  Thinks a lot of it has to do to #2 and 3 on the list here.  - CBC with platelets and differential; Future  - Testosterone Free and Total; Future  - TSH with free T4 reflex; Future    Deviated septum  Trouble breathing at night because of this.  We will have him see ENT.    Chronic rhinitis  Trouble breathing at night because of this we will have him see ENT.    Morbid obesity (H)  Discussed weight loss and exercise which has helped him in the past with symptoms.    Vitamin D deficiency  We will just check on a level here because of his fatigue.  - Vitamin D Deficiency; Future      32 minutes spent on the date of the encounter doing chart review, review of test results, patient visit and documentation        CONSULTATION/REFERRAL to ENT    No follow-ups on file.    Byron Ross MD  [facility]   Jeevan is a 60 year old who presents for the following health issues : Complains of fatigue.  Wakes up in the morning and an hour later he is tired again.  Tiredness during the day.  He has had sleep study.  He has been on CPAP.  He wears a mouth brace.  He feels a lot of this is due to his heavy congestion deviated septum.  He is not sleeping well.  He questions testosterone.    HPI     Chief Complaint   Patient presents with     Fatigue     has been ongoing for a long time, but getting worse           Review of Systems         Objective           Vitals:  No vitals were obtained today due to virtual visit.    Physical Exam   healthy, alert and no distress  PSYCH:  Alert and oriented times 3; coherent speech, normal   rate and volume, able to articulate logical thoughts, able   to abstract reason, no tangential thoughts, no hallucinations   or delusions  His affect is normal and pleasant  RESP: No cough, no audible wheezing, able to talk in full sentences  Remainder of exam unable to be completed due to telephone visits                Phone call duration: 11 minutes

## 2021-09-23 ENCOUNTER — LAB (OUTPATIENT)
Dept: LAB | Facility: CLINIC | Age: 61
End: 2021-09-23
Payer: COMMERCIAL

## 2021-09-23 DIAGNOSIS — R53.83 FATIGUE, UNSPECIFIED TYPE: ICD-10-CM

## 2021-09-23 DIAGNOSIS — E55.9 VITAMIN D DEFICIENCY: ICD-10-CM

## 2021-09-23 LAB
BASOPHILS # BLD AUTO: 0.1 10E3/UL (ref 0–0.2)
BASOPHILS NFR BLD AUTO: 1 %
DEPRECATED CALCIDIOL+CALCIFEROL SERPL-MC: 76 UG/L (ref 20–75)
EOSINOPHIL # BLD AUTO: 0 10E3/UL (ref 0–0.7)
EOSINOPHIL NFR BLD AUTO: 0 %
ERYTHROCYTE [DISTWIDTH] IN BLOOD BY AUTOMATED COUNT: 13.4 % (ref 10–15)
HCT VFR BLD AUTO: 43.4 % (ref 40–53)
HGB BLD-MCNC: 14.3 G/DL (ref 13.3–17.7)
IMM GRANULOCYTES # BLD: 0 10E3/UL
IMM GRANULOCYTES NFR BLD: 0 %
LYMPHOCYTES # BLD AUTO: 1.4 10E3/UL (ref 0.8–5.3)
LYMPHOCYTES NFR BLD AUTO: 20 %
MCH RBC QN AUTO: 30.2 PG (ref 26.5–33)
MCHC RBC AUTO-ENTMCNC: 32.9 G/DL (ref 31.5–36.5)
MCV RBC AUTO: 92 FL (ref 78–100)
MONOCYTES # BLD AUTO: 0.5 10E3/UL (ref 0–1.3)
MONOCYTES NFR BLD AUTO: 8 %
NEUTROPHILS # BLD AUTO: 5.1 10E3/UL (ref 1.6–8.3)
NEUTROPHILS NFR BLD AUTO: 71 %
NRBC # BLD AUTO: 0 10E3/UL
NRBC BLD AUTO-RTO: 0 /100
PLATELET # BLD AUTO: 224 10E3/UL (ref 150–450)
RBC # BLD AUTO: 4.74 10E6/UL (ref 4.4–5.9)
SHBG SERPL-SCNC: 23 NMOL/L (ref 11–80)
TSH SERPL DL<=0.005 MIU/L-ACNC: 1.8 MU/L (ref 0.4–4)
WBC # BLD AUTO: 7.2 10E3/UL (ref 4–11)

## 2021-09-23 PROCEDURE — 36415 COLL VENOUS BLD VENIPUNCTURE: CPT

## 2021-09-23 PROCEDURE — 82306 VITAMIN D 25 HYDROXY: CPT

## 2021-09-23 PROCEDURE — 84270 ASSAY OF SEX HORMONE GLOBUL: CPT

## 2021-09-23 PROCEDURE — 85025 COMPLETE CBC W/AUTO DIFF WBC: CPT

## 2021-09-23 PROCEDURE — 84443 ASSAY THYROID STIM HORMONE: CPT

## 2021-09-23 PROCEDURE — 84403 ASSAY OF TOTAL TESTOSTERONE: CPT

## 2021-09-25 LAB
SHBG SERPL-SCNC: 23 NMOL/L (ref 11–80)
TESTOST FREE SERPL-MCNC: 6.29 NG/DL
TESTOST SERPL-MCNC: 266 NG/DL (ref 240–950)

## 2021-10-04 ENCOUNTER — VIRTUAL VISIT (OUTPATIENT)
Dept: FAMILY MEDICINE | Facility: CLINIC | Age: 61
End: 2021-10-04
Payer: COMMERCIAL

## 2021-10-04 DIAGNOSIS — R53.83 FATIGUE, UNSPECIFIED TYPE: Primary | ICD-10-CM

## 2021-10-04 DIAGNOSIS — J31.0 CHRONIC RHINITIS: ICD-10-CM

## 2021-10-04 DIAGNOSIS — J34.2 DEVIATED SEPTUM: ICD-10-CM

## 2021-10-04 PROCEDURE — 99213 OFFICE O/P EST LOW 20 MIN: CPT | Mod: 95 | Performed by: FAMILY MEDICINE

## 2021-10-04 RX ORDER — LANOLIN ALCOHOL/MO/W.PET/CERES
CREAM (GRAM) TOPICAL DAILY
COMMUNITY

## 2021-10-04 RX ORDER — VITAMIN A 3000 MCG
1 CAPSULE ORAL DAILY
COMMUNITY

## 2021-10-04 NOTE — PROGRESS NOTES
"Jeevan is a 60 year old who is being evaluated via a billable telephone visit.      What phone number would you like to be contacted at? [phone]  How would you like to obtain your AVS? MyChart    Assessment & Plan     Fatigue, unspecified type  He wanted a telephone conversation to discuss his labs.  It was rather long.  Went through each 1 I believe to his satisfaction.  Decided that his testosterone level was not playing a role in his fatigue.  It is on the low end but if he wants to do anything we I recommended consider urology consult.  His thyroid test was normal.  His vitamin D level is actually high.  Hemoglobin was fine.  After rather long telephone visit the 2 items below are probably contributing to his fatigue because he is probably not sleeping well.    Chronic rhinitis  Congestion.  He does use CPAP.  But only occasionally.    Deviated septum  From previous visit this was noted and we are going to set him up for an ENT consult as this may be contributing to his trouble sleeping and ultimately his fatigue during the day.               BMI:   Estimated body mass index is 38.86 kg/m  as calculated from the following:    Height as of 4/20/21: 1.765 m (5' 9.5\").    Weight as of 4/20/21: 121.1 kg (267 lb).           No follow-ups on file.    Byron Ross MD  [facility]    Subjective   Jeevan is a 60 year old who presents for the following health issues: In a virtual visit a week ago and calls today to discuss his lab results to go over them more thoroughly and options for dealing with his fatigue and his congestion.    HPI     Chief Complaint   Patient presents with     Results     follow up on lab results drawn last week         Review of Systems   Constitutional, HEENT, cardiovascular, pulmonary, gi and gu systems are negative, except as otherwise noted.      Objective           Vitals:  No vitals were obtained today due to virtual visit.    Physical Exam   healthy, alert and no " distress  PSYCH: Alert and oriented times 3; coherent speech, normal   rate and volume, able to articulate logical thoughts, able   to abstract reason, no tangential thoughts, no hallucinations   or delusions  His affect is normal and pleasant  RESP: No cough, no audible wheezing, able to talk in full sentences  Remainder of exam unable to be completed due to telephone visits    No results found for any visits on 10/04/21.            Phone call duration: 18 minutes

## 2021-10-23 ENCOUNTER — HEALTH MAINTENANCE LETTER (OUTPATIENT)
Age: 61
End: 2021-10-23

## 2021-10-29 DIAGNOSIS — J06.9 URI (UPPER RESPIRATORY INFECTION): Primary | ICD-10-CM

## 2021-10-29 RX ORDER — AZITHROMYCIN 250 MG/1
TABLET, FILM COATED ORAL
Qty: 6 TABLET | Refills: 0 | Status: SHIPPED | OUTPATIENT
Start: 2021-10-29 | End: 2021-11-03

## 2021-10-29 NOTE — TELEPHONE ENCOUNTER
Reason for Call:  Same Day Appointment, Requested Provider:  Dr Ross    PCP: Alistair Martinez    Reason for visit: cold sx    Duration of symptoms: two days    Have you been treated for this in the past? No    Additional comments: is wondering if Dr Ross would work him in today. Declined another provider    Can we leave a detailed message on this number? YES    Phone number patient can be reached at: [phone]    Best Time: any    Call taken on 10/29/2021 at 7:44 AM by Estrella Barron

## 2021-10-29 NOTE — TELEPHONE ENCOUNTER
Attempted to call patient twice, no answer and just dead air. No option to leave message.     MAT MarkN, RN  [facility]

## 2021-10-29 NOTE — TELEPHONE ENCOUNTER
Byron Ross MD  You 11 minutes ago (8:50 AM)     SJ    I cannot see him today.  See what is up with this, he said it has only been 2 days!! Does he need a Covid testing we could set that up.  Otherwise could probably see him next week using one of my virtual spots    Message text

## 2021-10-29 NOTE — TELEPHONE ENCOUNTER
"Patient is returning call.  He stated he has a history of chest and head congestion turning into pneumonia and Dr. Cody MD has prescribed him Zithromax in the past.  He stated without catching the symptoms soon, he has had to be hospitalized and just wanted to try and be \"on top\" of the symptoms.    Advised patient home care instructions for head and chest congestion per RN protocol Odalys Robertson, 5th edition.  Advised patient to schedule for the virtual visit offered by Dr. Cody MD.  Patient Scheduled Monday 11/1/2021 for virtual visit.    Patient requested message to still be sent for request on antibiotic. He stated we may leave a detailed message on his phone [phone] and uses [facility] in [place] if approved. Advised patient to seek emergency care for worsening symptoms.  Patient stated understanding.      Farrah Knapp RN    "

## 2021-11-01 ENCOUNTER — VIRTUAL VISIT (OUTPATIENT)
Dept: FAMILY MEDICINE | Facility: CLINIC | Age: 61
End: 2021-11-01
Payer: COMMERCIAL

## 2021-11-01 DIAGNOSIS — J31.0 CHRONIC RHINITIS: ICD-10-CM

## 2021-11-01 DIAGNOSIS — J34.2 DEVIATED SEPTUM: ICD-10-CM

## 2021-11-01 DIAGNOSIS — J40 BRONCHITIS: Primary | ICD-10-CM

## 2021-11-01 PROCEDURE — 99213 OFFICE O/P EST LOW 20 MIN: CPT | Mod: 95 | Performed by: FAMILY MEDICINE

## 2021-11-01 NOTE — PROGRESS NOTES
"Jeevan is a 60 year old who is being evaluated via a billable telephone visit.      What phone number would you like to be contacted at? [phone]  How would you like to obtain your AVS? MyChart    Assessment & Plan     Bronchitis  He is just finishing up a Z-Christian he thinks this was done wonders for him.  I reassured him that it stays in his system for several days afterward.  He does not want to get tested for Covid though he certainly has had symptoms suggestive of that.  He works from home and has been over a week now since his symptoms started and he is getting much better.    Deviated septum  This is probably interfering with his sleep and breathing he is taken nasal sprays.  We will set him up for ENT referral which we did once before but somehow he never heard from them.  Probably contributing to his sleep interruptions at night.  Which is also what may be contributing to his fatigue during the day.  Him a sleep eval but he wants to start with ENT referral.  They are also in charge of the sleep clinic so we make a suggestion on their own.  - Otolaryngology Referral; Future    Chronic rhinitis  See discussion above with the same.  - Otolaryngology Referral; Future             BMI:   Estimated body mass index is 38.86 kg/m  as calculated from the following:    Height as of 4/20/21: 1.765 m (5' 9.5\").    Weight as of 4/20/21: 121.1 kg (267 lb).           No follow-ups on file.    Byron Ross MD  [facility]    Tim Chew is a 60 year old who presents for the following health issues : Telephone virtual visit for follow-up from coughing that he had last week.  He was started Friday on a Z-Christian and he is much better now.  A month ago we saw him for fatigue been kind of on and off.  Multiple tests have been done and nothing is been positive.  Had no known exposure to Covid and he did have one Moderna vaccine but claims his blood pressure went up after that so he never sought the " second 1.  Chief Complaint   Patient presents with     URI     update with condition (zinthromax started 10/26)       HPI           Review of Systems   Constitutional, HEENT, cardiovascular, pulmonary, gi and gu systems are negative, except as otherwise noted.      Objective           Vitals:  No vitals were obtained today due to virtual visit.    Physical Exam   healthy, alert and no distress  PSYCH: Alert and oriented times 3; coherent speech, normal   rate and volume, able to articulate logical thoughts, able   to abstract reason, no tangential thoughts, no hallucinations   or delusions  His affect is normal and pleasant  RESP: No cough, no audible wheezing, able to talk in full sentences  Remainder of exam unable to be completed due to telephone visits                Phone call duration: 12 minutes

## 2021-11-04 ENCOUNTER — TELEPHONE (OUTPATIENT)
Dept: FAMILY MEDICINE | Facility: CLINIC | Age: 61
End: 2021-11-04

## 2021-11-04 NOTE — TELEPHONE ENCOUNTER
Patient is still having the fatigue.  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    He has a difficult time sleeping.    When he wakes up from a deep sleep he has headaches. He also has a whooshing sound in his ear when he wakes up. This lasts about 5 seconds.      On the left side of his tongue, he has pain like a shock on and off.    His blood pressure this morning 181/ 102 before his medication.    After medication 200/115.    He does not have any chest pain right now.  He is still recovering from a cold- he is on Zithromax and this has helped.    Please advise.      Malena Cash RN on 11/4/2021 at 9:26 AM

## 2021-11-11 NOTE — PROGRESS NOTES
ENT Consultation    Jeevan Lopez who is a 61 year old male seen in consultation at the request of Dr. Ross .      History of Present Illness - Jeevan Lopez is a 61 year old male presents for relation of nasal congestion obstruction.  Patient has history of obstructive sleep apnea.  Moderate NI  AHI 27.2 in 2016.  However he is trying to use nasal mask interface but has difficulty due to nasal congestion at night.  He used fluticasone now using Nasacort with some relief but not consistently.  He feels that if he could have a better nasal airflow he could easily use his CPAP.  He tried full facemask previously but could not tolerate it.  Sense of smell appears to be intact.  He denies any allergies seasonal or perennial.  Second problem is pulsatile tinnitus that he experiences bilaterally.  Is nonpulsatile and maybe happens once or twice a week and goes away.  It is bilateral.  He does not appreciate any dizziness vertigo or hearing loss.  There is no height or weight on file to calculate BMI.     Weight management plan: Patient was referred to their PCP to discuss a diet and exercise plan.    BP Readings from Last 1 Encounters:   04/20/21 (!) 142/92       BP noted to be elevated today in office.  Patient to follow up with Primary Care provider regarding elevated blood pressure.    Jeevan IS NOT a smoker/uses chewing tobacco.     Past Medical History -   Past Medical History:   Diagnosis Date     Adenomatous polyp of colon 9/2013    q 5 yr colonoscopy     Bell's palsy 1/17/2007     Deviated septum 9/17/2013    See CT scan     Hypertension      LVH (left ventricular hypertrophy) 6/7/2011    see stress echo     NI (obstructive sleep apnea) 2016    moderate - dental appliance     Sensory polyneuropathy 2014    feet - see neuro consult     VENTRAL HERNIA NEC 6/19/2007       Current Medications -   Current Outpatient Medications:      amLODIPine (NORVASC) 10 MG tablet, Take 1 tablet (10 mg) by mouth daily, Disp: 90  tablet, Rfl: 1     aspirin (ASPIRIN LOW DOSE) 81 MG tablet, Take 1 tablet by mouth daily., Disp: , Rfl:      cyanocobalamin (VITAMIN B-12) 1000 MCG tablet, Take by mouth daily, Disp: , Rfl:      hydrochlorothiazide (HYDRODIURIL) 25 MG tablet, Take 1 tablet by mouth once daily, Disp: 90 tablet, Rfl: 0     NONFORMULARY, , Disp: , Rfl:      omeprazole 20 MG tablet, Take 1 tablet (20 mg) by mouth daily, Disp: 90 tablet, Rfl: 4     Vitamin A 7.5 MG (73243 UT) CAPS, , Disp: , Rfl:      vitamin D3 (CHOLECALCIFEROL) 250 mcg (95410 units) capsule, Take 1 capsule by mouth daily, Disp: , Rfl:     Allergies -   Allergies   Allergen Reactions     Lisinopril Other (See Comments)     Dry hacky cough       Social History -   Social History     Socioeconomic History     Marital status:      Spouse name: Not on file     Number of children: Not on file     Years of education: Not on file     Highest education level: Not on file   Occupational History     Not on file   Tobacco Use     Smoking status: Never Smoker     Smokeless tobacco: Never Used   Substance and Sexual Activity     Alcohol use: Yes     Alcohol/week: 0.0 standard drinks     Comment: 24 beers per week     Drug use: No     Sexual activity: Yes     Partners: Female     Comment: single, 1 child, work - self employed   Other Topics Concern     Parent/sibling w/ CABG, MI or angioplasty before 65F 55M? Not Asked   Social History Narrative     Not on file     Social Determinants of Health     Financial Resource Strain: Not on file   Food Insecurity: Not on file   Transportation Needs: Not on file   Physical Activity: Not on file   Stress: Not on file   Social Connections: Not on file   Intimate Partner Violence: Not on file   Housing Stability: Not on file       Family History -   Family History   Problem Relation Age of Onset     Diabetes Father      Hypertension Father      Heart Disease Father         double bypass-smoker     Cancer Father         lung, brain      Hypertension Brother      Hypertension Mother      Unknown/Adopted Maternal Grandmother      Unknown/Adopted Maternal Grandfather      Unknown/Adopted Paternal Grandmother      Unknown/Adopted Paternal Grandfather        Review of Systems - As per HPI and PMHx, otherwise review of system review of the head and neck negative. Otherwise 10+ review of system is negative    Physical Exam  There were no vitals taken for this visit.  BMI: There is no height or weight on file to calculate BMI.    General - The patient is well nourished and well developed, and appears to have good nutritional status.  Alert and oriented to person and place, answers questions and cooperates with examination appropriately.    SKIN - No suspicious lesions or rashes.  Respiration - No respiratory distress.  Head and Face - Normocephalic and atraumatic, with no gross asymmetry noted of the contour of the facial features.  The facial nerve is intact, with strong symmetric movements.    Voice and Breathing - The patient was breathing comfortably without the use of accessory muscles. The patients voice was clear and strong, and had appropriate pitch and quality.    Ears - Bilateral pinna and EACs with normal appearing overlying skin. Tympanic membrane intact with good mobility on pneumatic otoscopy bilaterally. Bony landmarks of the ossicular chain are normal. The tympanic membranes are normal in appearance. No retraction, perforation, or masses.  No fluid or purulence was seen in the external canal or the middle ear.     Eyes - Extraocular movements intact.  Sclera were not icteric or injected, conjunctiva were pink and moist.    Mouth - Examination of the oral cavity showed pink, healthy oral mucosa. No lesions or ulcerations noted.  The tongue was mobile and midline, and the dentition were in good condition.      Throat - The walls of the oropharynx were smooth, pink, moist, symmetric, and had no lesions or ulcerations.  The tonsillar pillars  and soft palate were symmetric.  The uvula was midline on elevation.    Neck - Normal midline excursion of the laryngotracheal complex during swallowing.  Full range of motion on passive movement.  Palpation of the occipital, submental, submandibular, internal jugular chain, and supraclavicular nodes did not demonstrate any abnormal lymph nodes or masses.  The carotid pulse was palpable bilaterally.  Palpation of the thyroid was soft and smooth, with no nodules or goiter appreciated.  The trachea was mobile and midline.    Nose - External contour is symmetric, no gross deflection or scars.  Nasal mucosa is pink and moist with no abnormal mucus.  The septum was somewhat deviated to the left and partially obstructive, turbinates of large size and position.  No polyps, masses, or purulence noted on examination.    Neuro - Nonfocal neuro exam is normal, CN 2 through 12 intact, normal gait and muscle tone.      Performed in clinic today:  After decongestion of his nose with Rodriguez-Synephrine unable to observe much improved airflow bilaterally even though still slightly narrowed on the left on the right due to slight septal deviation but with decongestion of turbinates significantly improved airflow.      A/P - Jeevan Lopez is a 61 year old male with some nasal congestion obstruction mostly due to hypertrophy of turbinates partially responsive to nasal steroid sprays.  There is element of deviated septum and we discussed with the patient today different options of therapy.  He is in quite a more conservative approach starting with submucous resection of turbinates done in the clinic setting outpatient setting.  We discussed the risks of bleeding infection discomfort.  He will have that arranged to be done in [place].      In regard to his pulsatile tinnitus would like to start with imaging getting MR angiography and MRI of the brain.  Is also a address his headaches that he has had lately to make sure there is no other  ominous finding.  Patient will follow up in the next couple weeks to discuss his MRI also get an audiogram with the next visit.  We will also perform submucous resection of turbinates in the outpatient setting.  Patient also needs new CPAP supplies so he can continue doing his CPAP in the meantime.      Hector Dillon MD

## 2021-11-17 ENCOUNTER — OFFICE VISIT (OUTPATIENT)
Dept: OTOLARYNGOLOGY | Facility: OTHER | Age: 61
End: 2021-11-17
Attending: FAMILY MEDICINE
Payer: COMMERCIAL

## 2021-11-17 VITALS
WEIGHT: 269 LBS | RESPIRATION RATE: 18 BRPM | BODY MASS INDEX: 38.51 KG/M2 | HEIGHT: 70 IN | SYSTOLIC BLOOD PRESSURE: 150 MMHG | DIASTOLIC BLOOD PRESSURE: 100 MMHG | TEMPERATURE: 98.7 F

## 2021-11-17 DIAGNOSIS — J34.3 HYPERTROPHY OF BOTH INFERIOR NASAL TURBINATES: ICD-10-CM

## 2021-11-17 DIAGNOSIS — H93.A3 PULSATILE TINNITUS OF BOTH EARS: Primary | ICD-10-CM

## 2021-11-17 DIAGNOSIS — J31.0 CHRONIC RHINITIS: ICD-10-CM

## 2021-11-17 DIAGNOSIS — G47.33 OSA (OBSTRUCTIVE SLEEP APNEA): ICD-10-CM

## 2021-11-17 DIAGNOSIS — J34.2 DEVIATED SEPTUM: ICD-10-CM

## 2021-11-17 PROCEDURE — 99204 OFFICE O/P NEW MOD 45 MIN: CPT | Performed by: OTOLARYNGOLOGY

## 2021-11-17 ASSESSMENT — MIFFLIN-ST. JEOR: SCORE: 2033.49

## 2021-11-17 ASSESSMENT — PAIN SCALES - GENERAL: PAINLEVEL: NO PAIN (0)

## 2021-11-17 NOTE — LETTER
11/17/2021         RE: Jeevan Lopez  [address] [facility] [address]        Dear Colleague,    Thank you for referring your patient, Jeevan Lopez, to the [facility]. Please see a copy of my visit note below.    ENT Consultation    Jeevan Lopez who is a 61 year old male seen in consultation at the request of Dr. Ross .      History of Present Illness - Jeevan Lopez is a 61 year old male presents for relation of nasal congestion obstruction.  Patient has history of obstructive sleep apnea.  Moderate NI  AHI 27.2 in 2016.  However he is trying to use nasal mask interface but has difficulty due to nasal congestion at night.  He used fluticasone now using Nasacort with some relief but not consistently.  He feels that if he could have a better nasal airflow he could easily use his CPAP.  He tried full facemask previously but could not tolerate it.  Sense of smell appears to be intact.  He denies any allergies seasonal or perennial.  Second problem is pulsatile tinnitus that he experiences bilaterally.  Is nonpulsatile and maybe happens once or twice a week and goes away.  It is bilateral.  He does not appreciate any dizziness vertigo or hearing loss.  There is no height or weight on file to calculate BMI.     Weight management plan: Patient was referred to their PCP to discuss a diet and exercise plan.    BP Readings from Last 1 Encounters:   04/20/21 (!) 142/92       BP noted to be elevated today in office.  Patient to follow up with Primary Care provider regarding elevated blood pressure.    Jeevan IS NOT a smoker/uses chewing tobacco.     Past Medical History -   Past Medical History:   Diagnosis Date     Adenomatous polyp of colon 9/2013    q 5 yr colonoscopy     Bell's palsy 1/17/2007     Deviated septum 9/17/2013    See CT scan     Hypertension      LVH (left ventricular hypertrophy) 6/7/2011    see stress echo     NI (obstructive sleep apnea) 2016    moderate - dental  appliance     Sensory polyneuropathy 2014    feet - see neuro consult     VENTRAL HERNIA NEC 6/19/2007       Current Medications -   Current Outpatient Medications:      amLODIPine (NORVASC) 10 MG tablet, Take 1 tablet (10 mg) by mouth daily, Disp: 90 tablet, Rfl: 1     aspirin (ASPIRIN LOW DOSE) 81 MG tablet, Take 1 tablet by mouth daily., Disp: , Rfl:      cyanocobalamin (VITAMIN B-12) 1000 MCG tablet, Take by mouth daily, Disp: , Rfl:      hydrochlorothiazide (HYDRODIURIL) 25 MG tablet, Take 1 tablet by mouth once daily, Disp: 90 tablet, Rfl: 0     NONFORMULARY, , Disp: , Rfl:      omeprazole 20 MG tablet, Take 1 tablet (20 mg) by mouth daily, Disp: 90 tablet, Rfl: 4     Vitamin A 7.5 MG (38261 UT) CAPS, , Disp: , Rfl:      vitamin D3 (CHOLECALCIFEROL) 250 mcg (96354 units) capsule, Take 1 capsule by mouth daily, Disp: , Rfl:     Allergies -   Allergies   Allergen Reactions     Lisinopril Other (See Comments)     Dry hacky cough       Social History -   Social History     Socioeconomic History     Marital status:      Spouse name: Not on file     Number of children: Not on file     Years of education: Not on file     Highest education level: Not on file   Occupational History     Not on file   Tobacco Use     Smoking status: Never Smoker     Smokeless tobacco: Never Used   Substance and Sexual Activity     Alcohol use: Yes     Alcohol/week: 0.0 standard drinks     Comment: 24 beers per week     Drug use: No     Sexual activity: Yes     Partners: Female     Comment: single, 1 child, work - self employed   Other Topics Concern     Parent/sibling w/ CABG, MI or angioplasty before 65F 55M? Not Asked   Social History Narrative     Not on file     Social Determinants of Health     Financial Resource Strain: Not on file   Food Insecurity: Not on file   Transportation Needs: Not on file   Physical Activity: Not on file   Stress: Not on file   Social Connections: Not on file   Intimate Partner Violence: Not on  file   Housing Stability: Not on file       Family History -   Family History   Problem Relation Age of Onset     Diabetes Father      Hypertension Father      Heart Disease Father         double bypass-smoker     Cancer Father         lung, brain     Hypertension Brother      Hypertension Mother      Unknown/Adopted Maternal Grandmother      Unknown/Adopted Maternal Grandfather      Unknown/Adopted Paternal Grandmother      Unknown/Adopted Paternal Grandfather        Review of Systems - As per HPI and PMHx, otherwise review of system review of the head and neck negative. Otherwise 10+ review of system is negative    Physical Exam  There were no vitals taken for this visit.  BMI: There is no height or weight on file to calculate BMI.    General - The patient is well nourished and well developed, and appears to have good nutritional status.  Alert and oriented to person and place, answers questions and cooperates with examination appropriately.    SKIN - No suspicious lesions or rashes.  Respiration - No respiratory distress.  Head and Face - Normocephalic and atraumatic, with no gross asymmetry noted of the contour of the facial features.  The facial nerve is intact, with strong symmetric movements.    Voice and Breathing - The patient was breathing comfortably without the use of accessory muscles. The patients voice was clear and strong, and had appropriate pitch and quality.    Ears - Bilateral pinna and EACs with normal appearing overlying skin. Tympanic membrane intact with good mobility on pneumatic otoscopy bilaterally. Bony landmarks of the ossicular chain are normal. The tympanic membranes are normal in appearance. No retraction, perforation, or masses.  No fluid or purulence was seen in the external canal or the middle ear.     Eyes - Extraocular movements intact.  Sclera were not icteric or injected, conjunctiva were pink and moist.    Mouth - Examination of the oral cavity showed pink, healthy oral  mucosa. No lesions or ulcerations noted.  The tongue was mobile and midline, and the dentition were in good condition.      Throat - The walls of the oropharynx were smooth, pink, moist, symmetric, and had no lesions or ulcerations.  The tonsillar pillars and soft palate were symmetric.  The uvula was midline on elevation.    Neck - Normal midline excursion of the laryngotracheal complex during swallowing.  Full range of motion on passive movement.  Palpation of the occipital, submental, submandibular, internal jugular chain, and supraclavicular nodes did not demonstrate any abnormal lymph nodes or masses.  The carotid pulse was palpable bilaterally.  Palpation of the thyroid was soft and smooth, with no nodules or goiter appreciated.  The trachea was mobile and midline.    Nose - External contour is symmetric, no gross deflection or scars.  Nasal mucosa is pink and moist with no abnormal mucus.  The septum was somewhat deviated to the left and partially obstructive, turbinates of large size and position.  No polyps, masses, or purulence noted on examination.    Neuro - Nonfocal neuro exam is normal, CN 2 through 12 intact, normal gait and muscle tone.      Performed in clinic today:  After decongestion of his nose with Rodriguez-Synephrine unable to observe much improved airflow bilaterally even though still slightly narrowed on the left on the right due to slight septal deviation but with decongestion of turbinates significantly improved airflow.      A/P - Jeevan Lopez is a 61 year old male with some nasal congestion obstruction mostly due to hypertrophy of turbinates partially responsive to nasal steroid sprays.  There is element of deviated septum and we discussed with the patient today different options of therapy.  He is in quite a more conservative approach starting with submucous resection of turbinates done in the clinic setting outpatient setting.  We discussed the risks of bleeding infection discomfort.  He  will have that arranged to be done in [place].      In regard to his pulsatile tinnitus would like to start with imaging getting MR angiography and MRI of the brain.  Is also a address his headaches that he has had lately to make sure there is no other ominous finding.  Patient will follow up in the next couple weeks to discuss his MRI also get an audiogram with the next visit.  We will also perform submucous resection of turbinates in the outpatient setting.  Patient also needs new CPAP supplies so he can continue doing his CPAP in the meantime.      Hector Dillon MD      Again, thank you for allowing me to participate in the care of your patient.        Sincerely,        Hector Dillon MD, MD

## 2021-11-23 ENCOUNTER — HOSPITAL ENCOUNTER (OUTPATIENT)
Dept: MRI IMAGING | Facility: CLINIC | Age: 61
End: 2021-11-23
Attending: OTOLARYNGOLOGY
Payer: COMMERCIAL

## 2021-11-23 DIAGNOSIS — H93.A3 PULSATILE TINNITUS OF BOTH EARS: ICD-10-CM

## 2021-11-23 DIAGNOSIS — D32.9 MENINGIOMA (H): Primary | ICD-10-CM

## 2021-11-23 PROCEDURE — 70553 MRI BRAIN STEM W/O & W/DYE: CPT

## 2021-11-23 PROCEDURE — A9585 GADOBUTROL INJECTION: HCPCS | Performed by: OTOLARYNGOLOGY

## 2021-11-23 PROCEDURE — 70544 MR ANGIOGRAPHY HEAD W/O DYE: CPT

## 2021-11-23 PROCEDURE — 255N000002 HC RX 255 OP 636: Performed by: OTOLARYNGOLOGY

## 2021-11-23 RX ORDER — GADOBUTROL 604.72 MG/ML
10 INJECTION INTRAVENOUS ONCE
Status: COMPLETED | OUTPATIENT
Start: 2021-11-23 | End: 2021-11-23

## 2021-11-23 RX ADMIN — GADOBUTROL 10 ML: 604.72 INJECTION INTRAVENOUS at 09:27

## 2021-12-06 ENCOUNTER — OFFICE VISIT (OUTPATIENT)
Dept: FAMILY MEDICINE | Facility: CLINIC | Age: 61
End: 2021-12-06
Payer: COMMERCIAL

## 2021-12-06 VITALS
WEIGHT: 266 LBS | DIASTOLIC BLOOD PRESSURE: 90 MMHG | SYSTOLIC BLOOD PRESSURE: 144 MMHG | BODY MASS INDEX: 38.72 KG/M2 | HEART RATE: 85 BPM | OXYGEN SATURATION: 99 % | RESPIRATION RATE: 10 BRPM | TEMPERATURE: 96 F

## 2021-12-06 DIAGNOSIS — H65.02 ACUTE SEROUS OTITIS MEDIA OF LEFT EAR, RECURRENCE NOT SPECIFIED: ICD-10-CM

## 2021-12-06 DIAGNOSIS — J40 BRONCHITIS: Primary | ICD-10-CM

## 2021-12-06 PROCEDURE — 99213 OFFICE O/P EST LOW 20 MIN: CPT | Performed by: FAMILY MEDICINE

## 2021-12-06 RX ORDER — AZITHROMYCIN 250 MG/1
TABLET, FILM COATED ORAL
Qty: 6 TABLET | Refills: 0 | Status: SHIPPED | OUTPATIENT
Start: 2021-12-06 | End: 2021-12-11

## 2021-12-06 NOTE — PROGRESS NOTES
Assessment & Plan     Bronchitis  We will put him on Zithromax this is worked well for him in the past.  He has been tested twice for Covid in the last 4 days.  Negative.  He will follow-up if not improving.  - azithromycin (ZITHROMAX) 250 MG tablet; Take 2 tablets (500 mg) by mouth daily for 1 day, THEN 1 tablet (250 mg) daily for 4 days.    Acute serous otitis media of left ear, recurrence not specified  Uses Zithromax as above he can use a decongestant as well.                   No follow-ups on file.    Byron Ross MD  [facility]    Tim Chew is a 61 year old who presents for the following health issues: As noted below.  He is tested twice for Covid in the last 4 days and has been negative.    HPI     Chief Complaint   Patient presents with     Cough     Onset 1 week, coughing up brownish sputum, tightness in chest gets severe.       Review of Systems   Constitutional, HEENT, cardiovascular, pulmonary, gi and gu systems are negative, except as otherwise noted.      Objective    There were no vitals taken for this visit.  There is no height or weight on file to calculate BMI.  Physical Exam   GENERAL: healthy, alert and no distress  EYES: Eyes grossly normal to inspection, PERRL and conjunctivae and sclerae normal  HENT: normal cephalic/atraumatic, right ear: Some serous fluid., left ear: TM is a little bit reddened., nose and mouth without ulcers or lesions, oropharynx clear and oral mucous membranes moist  NECK: no adenopathy, no asymmetry, masses, or scars and thyroid normal to palpation  RESP: lungs clear to auscultation - no rales, rhonchi or wheezes  CV: regular rate and rhythm, normal S1 S2, no S3 or S4, no murmur, click or rub, no peripheral edema and peripheral pulses strong  MS: no gross musculoskeletal defects noted, no edema  SKIN: no suspicious lesions or rashes  PSYCH: mentation appears normal, affect normal/bright

## 2021-12-09 ENCOUNTER — OFFICE VISIT (OUTPATIENT)
Dept: NEUROSURGERY | Facility: CLINIC | Age: 61
End: 2021-12-09
Attending: OTOLARYNGOLOGY
Payer: COMMERCIAL

## 2021-12-09 DIAGNOSIS — D32.9 MENINGIOMA (H): Primary | ICD-10-CM

## 2021-12-09 PROCEDURE — 99204 OFFICE O/P NEW MOD 45 MIN: CPT | Performed by: NEUROLOGICAL SURGERY

## 2021-12-09 NOTE — LETTER
12/9/2021         RE: Jeevan Lopez  [address] [facility] [id number]        Dear Colleague,    Thank you for referring your patient, Jeevan Lopez, to the [facility]UR[facility] [place]. Please see a copy of my visit note below.    I was asked by Dr. Dillon to see this patient in consultation    61M w/ left frontal convexity 2.4 cm meningioma.  Saw Dr. Dillon for work-up of pulsatile tinnitus, and MR/MRA was obtained which showed the lesion.  No headaches or weakness/numbness.  MR Brain, personally reviewed, shows 2.4 x 2.3 cm left posterior frontal convexity enhancing mass consistent with meningioma overlying the motor strip with local mass effect, but no flair changes.       Past Medical History:   Diagnosis Date     Adenomatous polyp of colon 9/2013    q 5 yr colonoscopy     Bell's palsy 1/17/2007     Deviated septum 9/17/2013    See CT scan     Hypertension      LVH (left ventricular hypertrophy) 6/7/2011    see stress echo     NI (obstructive sleep apnea) 2016    moderate - dental appliance     Sensory polyneuropathy 2014    feet - see neuro consult     VENTRAL HERNIA NEC 6/19/2007     Past Surgical History:   Procedure Laterality Date     COLONOSCOPY  9/30/2013    Procedure: COMBINED COLONOSCOPY, SINGLE BIOPSY/POLYPECTOMY BY BIOPSY;  colonoscopy, polypectomy by biopsy;  Surgeon: Pedro Chris MD;  [facility]     COLONOSCOPY N/A 8/7/2017    Procedure: COLONOSCOPY;  colonoscopy;  Surgeon: Kiel Solomon MD;  [facility]: [facility]     EXCISE MASS HEAD  11/18/2013    Procedure: EXCISE MASS HEAD;  Excision of left forehead mass;  Surgeon: Pelon Echavarria MD;  Location: [facility]     HC REPAIR UMBILICAL YVONNE,5+Y/O, INCARCERATED OR STRANGULATED  6/9/2004     LASER YAG CAPSULOTOMY Left 6/2/2016    Procedure: LASER YAG CAPSULOTOMY;  Surgeon: Joss Raza MD;  Location: [facility]     PHACOEMULSIFICATION WITH STANDARD INTRAOCULAR LENS IMPLANT Left 11/5/2015     Procedure: PHACOEMULSIFICATION WITH STANDARD INTRAOCULAR LENS IMPLANT;  Surgeon: Joss Raza MD;  Location:  OR     Social History     Socioeconomic History     Marital status:      Spouse name: Not on file     Number of children: Not on file     Years of education: Not on file     Highest education level: Not on file   Occupational History     Not on file   Tobacco Use     Smoking status: Never Smoker     Smokeless tobacco: Never Used   Substance and Sexual Activity     Alcohol use: Yes     Alcohol/week: 0.0 standard drinks     Comment: 24 beers per week     Drug use: No     Sexual activity: Yes     Partners: Female     Comment: single, 1 child, work - self employed   Other Topics Concern     Parent/sibling w/ CABG, MI or angioplasty before 65F 55M? Not Asked   Social History Narrative     Not on file     Social Determinants of Health     Financial Resource Strain: Not on file   Food Insecurity: Not on file   Transportation Needs: Not on file   Physical Activity: Not on file   Stress: Not on file   Social Connections: Not on file   Intimate Partner Violence: Not on file   Housing Stability: Not on file     Family History   Problem Relation Age of Onset     Diabetes Father      Hypertension Father      Heart Disease Father         double bypass-smoker     Cancer Father         lung, brain     Hypertension Brother      Hypertension Mother      Unknown/Adopted Maternal Grandmother      Unknown/Adopted Maternal Grandfather      Unknown/Adopted Paternal Grandmother      Unknown/Adopted Paternal Grandfather         ROS: 10 point ROS neg other than the symptoms noted above in the HPI.    Physical Exam  There were no vitals taken for this visit.  HEENT:  Normocephalic, atraumatic.  PERRLA.  EOM s intact.  Visual fields full to gross exam  Neck:  Supple, non-tender, without lymphadenopathy.  Heart:  No peripheral edema  Lungs:  No SOB  Abdomen:  Non-distended.   Skin:  Warm and dry.  Extremities:  No  edema, cyanosis or clubbing.  Psychiatric:  No apparent distress  Musculoskeletal:  Normal bulk and tone    NEUROLOGICAL EXAMINATION:     Mental status:  Alert and Oriented x 3, speech is fluent.  Cranial nerves:  II-XII intact.   Motor:    Shoulder Abduction:  Right:  5/5   Left:  5/5  Biceps:                      Right:  5/5   Left:  5/5  Triceps:                     Right:  5/5   Left:  5/5  Wrist Extensors:       Right:  5/5   Left:  5/5  Wrist Flexors:           Right:  5/5   Left:  5/5  interosseus :            Right:  5/5   Left:  5/5  Hip Flexion:                Right: 5/5  Left:  5/5  Quadriceps:             Right:  5/5  Left:  5/5  Hamstrings:             Right:  5/5  Left:  5/5  Gastroc Soleus:        Right:  5/5  Left:  5/5  Tib/Ant:                      Right:  5/5  Left:  5/5  EHL:                     Right:  5/5  Left:  5/5  Sensation:  Intact  Reflexes:  Negative Babinski.  Negative Clonus.  Negative Lockwood's.  Coordination:  Smooth finger to nose testing.   Negative pronator drift.  Smooth tandem walking.    A/P:  61M w/ left frontal convexity 2.4 cm meningioma    I had a discussion with the patient, reviewing the history, symptoms, and imaging  Will repeat MR Brain in 6 months for surveillance         Again, thank you for allowing me to participate in the care of your patient.        Sincerely,        Jalen Dillard MD

## 2021-12-09 NOTE — PROGRESS NOTES
I was asked by Dr. Dillon to see this patient in consultation    61M w/ left frontal convexity 2.4 cm meningioma.  Saw Dr. Dillon for work-up of pulsatile tinnitus, and MR/MRA was obtained which showed the lesion.  No headaches or weakness/numbness.  MR Brain, personally reviewed, shows 2.4 x 2.3 cm left posterior frontal convexity enhancing mass consistent with meningioma overlying the motor strip with local mass effect, but no flair changes.       Past Medical History:   Diagnosis Date     Adenomatous polyp of colon 9/2013    q 5 yr colonoscopy     Bell's palsy 1/17/2007     Deviated septum 9/17/2013    See CT scan     Hypertension      LVH (left ventricular hypertrophy) 6/7/2011    see stress echo     NI (obstructive sleep apnea) 2016    moderate - dental appliance     Sensory polyneuropathy 2014    feet - see neuro consult     VENTRAL HERNIA NEC 6/19/2007     Past Surgical History:   Procedure Laterality Date     COLONOSCOPY  9/30/2013    Procedure: COMBINED COLONOSCOPY, SINGLE BIOPSY/POLYPECTOMY BY BIOPSY;  colonoscopy, polypectomy by biopsy;  Surgeon: Pedro Chris MD;  [facility]     COLONOSCOPY N/A 8/7/2017    Procedure: COLONOSCOPY;  colonoscopy;  Surgeon: Kiel Solomon MD;  [facility]     EXCISE MASS HEAD  11/18/2013    Procedure: EXCISE MASS HEAD;  Excision of left forehead mass;  Surgeon: Pelon Echavarria MD;  [facility]     HC REPAIR UMBILICAL YVONNE,5+Y/O, INCARCERATED OR STRANGULATED  6/9/2004     LASER YAG CAPSULOTOMY Left 6/2/2016    Procedure: LASER YAG CAPSULOTOMY;  Surgeon: Joss Raza MD;  [facility]     PHACOEMULSIFICATION WITH STANDARD INTRAOCULAR LENS IMPLANT Left 11/5/2015    Procedure: PHACOEMULSIFICATION WITH STANDARD INTRAOCULAR LENS IMPLANT;  Surgeon: Joss Raza MD;  Location[facility]     Social History     Socioeconomic History     Marital status:      Spouse name: Not on file     Number of children: Not on file      Years of education: Not on file     Highest education level: Not on file   Occupational History     Not on file   Tobacco Use     Smoking status: Never Smoker     Smokeless tobacco: Never Used   Substance and Sexual Activity     Alcohol use: Yes     Alcohol/week: 0.0 standard drinks     Comment: 24 beers per week     Drug use: No     Sexual activity: Yes     Partners: Female     Comment: single, 1 child, work - self employed   Other Topics Concern     Parent/sibling w/ CABG, MI or angioplasty before 65F 55M? Not Asked   Social History Narrative     Not on file     Social Determinants of Health     Financial Resource Strain: Not on file   Food Insecurity: Not on file   Transportation Needs: Not on file   Physical Activity: Not on file   Stress: Not on file   Social Connections: Not on file   Intimate Partner Violence: Not on file   Housing Stability: Not on file     Family History   Problem Relation Age of Onset     Diabetes Father      Hypertension Father      Heart Disease Father         double bypass-smoker     Cancer Father         lung, brain     Hypertension Brother      Hypertension Mother      Unknown/Adopted Maternal Grandmother      Unknown/Adopted Maternal Grandfather      Unknown/Adopted Paternal Grandmother      Unknown/Adopted Paternal Grandfather         ROS: 10 point ROS neg other than the symptoms noted above in the HPI.    Physical Exam  There were no vitals taken for this visit.  HEENT:  Normocephalic, atraumatic.  PERRLA.  EOM s intact.  Visual fields full to gross exam  Neck:  Supple, non-tender, without lymphadenopathy.  Heart:  No peripheral edema  Lungs:  No SOB  Abdomen:  Non-distended.   Skin:  Warm and dry.  Extremities:  No edema, cyanosis or clubbing.  Psychiatric:  No apparent distress  Musculoskeletal:  Normal bulk and tone    NEUROLOGICAL EXAMINATION:     Mental status:  Alert and Oriented x 3, speech is fluent.  Cranial nerves:  II-XII intact.   Motor:    Shoulder Abduction:  Right:   5/5   Left:  5/5  Biceps:                      Right:  5/5   Left:  5/5  Triceps:                     Right:  5/5   Left:  5/5  Wrist Extensors:       Right:  5/5   Left:  5/5  Wrist Flexors:           Right:  5/5   Left:  5/5  interosseus :            Right:  5/5   Left:  5/5  Hip Flexion:                Right: 5/5  Left:  5/5  Quadriceps:             Right:  5/5  Left:  5/5  Hamstrings:             Right:  5/5  Left:  5/5  Gastroc Soleus:        Right:  5/5  Left:  5/5  Tib/Ant:                      Right:  5/5  Left:  5/5  EHL:                     Right:  5/5  Left:  5/5  Sensation:  Intact  Reflexes:  Negative Babinski.  Negative Clonus.  Negative Lockwood's.  Coordination:  Smooth finger to nose testing.   Negative pronator drift.  Smooth tandem walking.    A/P:  61M w/ left frontal convexity 2.4 cm meningioma    I had a discussion with the patient, reviewing the history, symptoms, and imaging  Will repeat MR Brain in 6 months for surveillance

## 2021-12-09 NOTE — PATIENT INSTRUCTIONS
Repeat MRI brain in 6 months. They will call to schedule. [place]: [phone].  You should schedule this within one week. We will call you with the results. If you don't hear from us 7 days after the scan, please call us.      DELFINA Hu  --  [facility]  Phone: [phone]  Fax: [phone]

## 2021-12-13 ENCOUNTER — MYC MEDICAL ADVICE (OUTPATIENT)
Dept: FAMILY MEDICINE | Facility: CLINIC | Age: 61
End: 2021-12-13
Payer: COMMERCIAL

## 2021-12-13 DIAGNOSIS — Z86.16 HISTORY OF COVID-19: Primary | ICD-10-CM

## 2021-12-17 ENCOUNTER — LAB (OUTPATIENT)
Dept: LAB | Facility: CLINIC | Age: 61
End: 2021-12-17
Payer: COMMERCIAL

## 2021-12-17 DIAGNOSIS — Z86.16 HISTORY OF COVID-19: ICD-10-CM

## 2021-12-17 PROCEDURE — 86769 SARS-COV-2 COVID-19 ANTIBODY: CPT | Mod: 90

## 2021-12-17 PROCEDURE — 36415 COLL VENOUS BLD VENIPUNCTURE: CPT

## 2021-12-17 PROCEDURE — 99000 SPECIMEN HANDLING OFFICE-LAB: CPT

## 2021-12-18 LAB
SARS-COV-2 AB SERPL IA-ACNC: 19 U/ML
SARS-COV-2 AB SERPL QL IA: POSITIVE

## 2022-02-12 ENCOUNTER — HEALTH MAINTENANCE LETTER (OUTPATIENT)
Age: 62
End: 2022-02-12

## 2022-02-17 PROBLEM — F10.20 ALCOHOL DEPENDENCE, UNCOMPLICATED (H): Status: ACTIVE | Noted: 2017-07-29

## 2022-02-23 ENCOUNTER — NURSE TRIAGE (OUTPATIENT)
Dept: NURSING | Facility: CLINIC | Age: 62
End: 2022-02-23
Payer: COMMERCIAL
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
## 2022-02-23 NOTE — TELEPHONE ENCOUNTER
"  Reason for Disposition    Numbness (i.e., loss of sensation) in hand or fingers    Additional Information    Negative: Passed out (i.e., lost consciousness, collapsed and was not responding)    Negative: Shock suspected (e.g., cold/pale/clammy skin, too weak to stand, low BP, rapid pulse)    Negative: [1] Similar pain previously AND [2] it was from \"heart attack\"    Negative: [1] Similar pain previously AND [2] it was from \"angina\" AND [3] not relieved by nitroglycerin    Negative: Sounds like a life-threatening emergency to the triager    Negative: Followed a shoulder injury    Negative: Chest pain    Negative: Difficulty breathing or unusual sweating (e.g., sweating without exertion)    Negative: [1] Pain lasting > 5 minutes AND [2] pain also present in chest  (Exception: pain is clearly made worse by movement)    Negative: [1] Age > 40 AND [2] no obvious cause AND [3] pain even when not moving the arm    (Exception: pain is clearly made worse by moving arm or bending neck)    Negative: [1] SEVERE pain AND [2] not improved 2 hours after pain medicine    Negative: [1] Red area or streak AND [2] fever    Negative: [1] Swollen joint AND [2] fever    Negative: Patient sounds very sick or weak to the triager    Negative: Entire arm is swollen    Negative: Weakness (i.e., loss of strength) in hand or fingers    (Exception: not truly weak; hand feels weak because of pain)    Negative: [1] Shoulder pains with exertion (e.g., walking) AND [2] pain goes away on resting AND [3] not present now    Negative: [1] Painful rash AND [2] multiple small blisters grouped together (i.e., dermatomal distribution or \"band\" or \"stripe\")    Negative: Looks like a boil, infected sore, deep ulcer or other infected rash (spreading redness, pus)    Negative: [1] Localized rash is very painful AND [2] no fever    Protocols used: SHOULDER PAIN-A-AH    "
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
## 2022-02-24 ENCOUNTER — OFFICE VISIT (OUTPATIENT)
Dept: FAMILY MEDICINE | Facility: CLINIC | Age: 62
End: 2022-02-24
Payer: COMMERCIAL

## 2022-02-24 VITALS
TEMPERATURE: 97.3 F | DIASTOLIC BLOOD PRESSURE: 82 MMHG | HEART RATE: 77 BPM | BODY MASS INDEX: 38.43 KG/M2 | RESPIRATION RATE: 22 BRPM | OXYGEN SATURATION: 98 % | SYSTOLIC BLOOD PRESSURE: 156 MMHG | WEIGHT: 264 LBS

## 2022-02-24 DIAGNOSIS — M79.2 RADICULAR PAIN IN RIGHT ARM: Primary | ICD-10-CM

## 2022-02-24 PROCEDURE — 99213 OFFICE O/P EST LOW 20 MIN: CPT | Performed by: FAMILY MEDICINE

## 2022-02-24 RX ORDER — PREDNISONE 20 MG/1
TABLET ORAL
Qty: 15 TABLET | Refills: 0 | Status: SHIPPED | OUTPATIENT
Start: 2022-02-24 | End: 2023-01-27

## 2022-02-24 RX ORDER — GABAPENTIN 300 MG/1
300 CAPSULE ORAL 2 TIMES DAILY
Qty: 25 CAPSULE | Refills: 0 | Status: SHIPPED | OUTPATIENT
Start: 2022-02-24 | End: 2023-01-27

## 2022-02-24 ASSESSMENT — PAIN SCALES - GENERAL: PAINLEVEL: SEVERE PAIN (6)
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
## 2022-09-07 DIAGNOSIS — I10 HYPERTENSION GOAL BP (BLOOD PRESSURE) < 140/90: ICD-10-CM

## 2022-09-09 NOTE — TELEPHONE ENCOUNTER
Routing refill request to provider for review/approval because:  Labs not current:  CREAT  A break in medication  BP elevated    Rm Thomas RN

## 2022-09-10 RX ORDER — AMLODIPINE BESYLATE 10 MG/1
TABLET ORAL
Qty: 30 TABLET | Refills: 0 | Status: SHIPPED | OUTPATIENT
Start: 2022-09-10 | End: 2023-01-27

## 2022-09-10 NOTE — TELEPHONE ENCOUNTER
One month supply refilled.  Please follow up before med runs out - need a physical and HTN follow up - 40 minutes

## 2022-09-12 ENCOUNTER — MYC MEDICAL ADVICE (OUTPATIENT)
Dept: FAMILY MEDICINE | Facility: CLINIC | Age: 62
End: 2022-09-12

## 2022-10-09 ENCOUNTER — HEALTH MAINTENANCE LETTER (OUTPATIENT)
Age: 62
End: 2022-10-09

## 2023-01-27 ENCOUNTER — OFFICE VISIT (OUTPATIENT)
Dept: FAMILY MEDICINE | Facility: OTHER | Age: 63
End: 2023-01-27
Payer: COMMERCIAL

## 2023-01-27 ENCOUNTER — ANCILLARY PROCEDURE (OUTPATIENT)
Dept: GENERAL RADIOLOGY | Facility: OTHER | Age: 63
End: 2023-01-27
Attending: FAMILY MEDICINE
Payer: COMMERCIAL

## 2023-01-27 VITALS
SYSTOLIC BLOOD PRESSURE: 150 MMHG | OXYGEN SATURATION: 95 % | HEART RATE: 91 BPM | BODY MASS INDEX: 39.25 KG/M2 | WEIGHT: 265 LBS | RESPIRATION RATE: 22 BRPM | DIASTOLIC BLOOD PRESSURE: 92 MMHG | TEMPERATURE: 97.8 F | HEIGHT: 69 IN

## 2023-01-27 DIAGNOSIS — M25.561 CHRONIC PAIN OF RIGHT KNEE: ICD-10-CM

## 2023-01-27 DIAGNOSIS — R73.9 HYPERGLYCEMIA: ICD-10-CM

## 2023-01-27 DIAGNOSIS — E66.01 MORBID OBESITY (H): ICD-10-CM

## 2023-01-27 DIAGNOSIS — K43.9 VENTRAL HERNIA WITHOUT OBSTRUCTION OR GANGRENE: ICD-10-CM

## 2023-01-27 DIAGNOSIS — Z12.11 SCREEN FOR COLON CANCER: Primary | ICD-10-CM

## 2023-01-27 DIAGNOSIS — M17.11 ARTHRITIS OF RIGHT KNEE: ICD-10-CM

## 2023-01-27 DIAGNOSIS — G62.1 ALCOHOLIC PERIPHERAL NEUROPATHY (H): ICD-10-CM

## 2023-01-27 DIAGNOSIS — D32.9 MENINGIOMA (H): ICD-10-CM

## 2023-01-27 DIAGNOSIS — G89.29 CHRONIC PAIN OF RIGHT KNEE: ICD-10-CM

## 2023-01-27 DIAGNOSIS — G47.33 OSA (OBSTRUCTIVE SLEEP APNEA): ICD-10-CM

## 2023-01-27 DIAGNOSIS — F10.20 ALCOHOL DEPENDENCE, UNCOMPLICATED (H): ICD-10-CM

## 2023-01-27 DIAGNOSIS — I10 HYPERTENSION GOAL BP (BLOOD PRESSURE) < 140/90: ICD-10-CM

## 2023-01-27 PROBLEM — M54.50 CHRONIC LEFT-SIDED LOW BACK PAIN WITHOUT SCIATICA: Status: RESOLVED | Noted: 2019-09-26 | Resolved: 2023-01-27

## 2023-01-27 PROBLEM — M25.512 ACUTE PAIN OF LEFT SHOULDER: Status: RESOLVED | Noted: 2018-05-09 | Resolved: 2023-01-27

## 2023-01-27 LAB
ALBUMIN SERPL BCG-MCNC: 4.4 G/DL (ref 3.5–5.2)
ALP SERPL-CCNC: 83 U/L (ref 40–129)
ALT SERPL W P-5'-P-CCNC: 11 U/L (ref 10–50)
ANION GAP SERPL CALCULATED.3IONS-SCNC: 10 MMOL/L (ref 7–15)
AST SERPL W P-5'-P-CCNC: 22 U/L (ref 10–50)
BILIRUB SERPL-MCNC: 0.6 MG/DL
BUN SERPL-MCNC: 10.9 MG/DL (ref 8–23)
CALCIUM SERPL-MCNC: 9.3 MG/DL (ref 8.8–10.2)
CHLORIDE SERPL-SCNC: 98 MMOL/L (ref 98–107)
CREAT SERPL-MCNC: 0.88 MG/DL (ref 0.67–1.17)
DEPRECATED HCO3 PLAS-SCNC: 28 MMOL/L (ref 22–29)
ERYTHROCYTE [DISTWIDTH] IN BLOOD BY AUTOMATED COUNT: 14.1 % (ref 10–15)
GFR SERPL CREATININE-BSD FRML MDRD: >90 ML/MIN/1.73M2
GLUCOSE SERPL-MCNC: 112 MG/DL (ref 70–99)
HBA1C MFR BLD: 5.4 % (ref 0–5.6)
HCT VFR BLD AUTO: 50.1 % (ref 40–53)
HGB BLD-MCNC: 16.5 G/DL (ref 13.3–17.7)
MCH RBC QN AUTO: 30.1 PG (ref 26.5–33)
MCHC RBC AUTO-ENTMCNC: 32.9 G/DL (ref 31.5–36.5)
MCV RBC AUTO: 91 FL (ref 78–100)
PLATELET # BLD AUTO: 243 10E3/UL (ref 150–450)
POTASSIUM SERPL-SCNC: 4.2 MMOL/L (ref 3.4–5.3)
PROT SERPL-MCNC: 7.2 G/DL (ref 6.4–8.3)
RBC # BLD AUTO: 5.49 10E6/UL (ref 4.4–5.9)
SODIUM SERPL-SCNC: 136 MMOL/L (ref 136–145)
TSH SERPL DL<=0.005 MIU/L-ACNC: 1.43 UIU/ML (ref 0.3–4.2)
WBC # BLD AUTO: 10.5 10E3/UL (ref 4–11)

## 2023-01-27 PROCEDURE — 80053 COMPREHEN METABOLIC PANEL: CPT | Performed by: FAMILY MEDICINE

## 2023-01-27 PROCEDURE — 85027 COMPLETE CBC AUTOMATED: CPT | Performed by: FAMILY MEDICINE

## 2023-01-27 PROCEDURE — 84443 ASSAY THYROID STIM HORMONE: CPT | Performed by: FAMILY MEDICINE

## 2023-01-27 PROCEDURE — 99214 OFFICE O/P EST MOD 30 MIN: CPT | Performed by: FAMILY MEDICINE

## 2023-01-27 PROCEDURE — 73562 X-RAY EXAM OF KNEE 3: CPT | Mod: TC | Performed by: RADIOLOGY

## 2023-01-27 PROCEDURE — 36415 COLL VENOUS BLD VENIPUNCTURE: CPT | Performed by: FAMILY MEDICINE

## 2023-01-27 PROCEDURE — 83036 HEMOGLOBIN GLYCOSYLATED A1C: CPT | Performed by: FAMILY MEDICINE

## 2023-01-27 RX ORDER — AMLODIPINE BESYLATE 10 MG/1
10 TABLET ORAL DAILY
Qty: 90 TABLET | Refills: 1 | Status: SHIPPED | OUTPATIENT
Start: 2023-01-27 | End: 2023-09-25

## 2023-01-27 RX ORDER — HYDROCHLOROTHIAZIDE 25 MG/1
25 TABLET ORAL DAILY
Qty: 90 TABLET | Refills: 1 | Status: SHIPPED | OUTPATIENT
Start: 2023-01-27 | End: 2023-09-25

## 2023-01-27 NOTE — RESULT ENCOUNTER NOTE
Mr. Lopez    Your recent test results are attached.  Hba1c is normal showing no signs of diabetes. Normal blood counts.     If you have any questions or concerns please contact me via My Chart or call the clinic at [phone]     Thank You  Elena Sapp MD.

## 2023-01-27 NOTE — PROGRESS NOTES
"  Assessment & Plan     Hypertension goal BP (blood pressure) < 140/90  Uncontrolled blood pressures  Restart blood pressure meds  Check labs as below to monitor kidney function and r/o thyroid dysfunction  Advised diet and lifestyle modifications  - hydrochlorothiazide (HYDRODIURIL) 25 MG tablet; Take 1 tablet (25 mg) by mouth daily  - amLODIPine (NORVASC) 10 MG tablet; Take 1 tablet (10 mg) by mouth daily  - Comprehensive metabolic panel (BMP + Alb, Alk Phos, ALT, AST, Total. Bili, TP); Future  - CBC with platelets; Future  - TSH with free T4 reflex; Future  - TSH with free T4 reflex  - CBC with platelets  - Comprehensive metabolic panel (BMP + Alb, Alk Phos, ALT, AST, Total. Bili, TP)    Screen for colon cancer  - Colonoscopy Screening  Referral; Future    NI (obstructive sleep apnea)  Encouraged to use cpap consistently    Meningioma (H)  Was seen by neurosurgery in 2021 . Overdue for a repeat MRI but patient declines this today. Will try to readdress at the next visit    Hyperglycemia  -   - Hemoglobin A1c; Future  - Hemoglobin A1c    Chronic pain of right knee  R/o arthritis . He prefers to see orthopedics for this  - XR Knee Right 3 Views; Future    Ventral hernia without obstruction or gangrene  Advised surgery eval as he has been symptomatic with it in the past and using external pressure to prevent a reccurence  - Adult General Surg Referral; Future    Alcohol dependence, uncomplicated (H)/Alcoholic peripheral neuropathy (H)  Highly recommended to quit. He declines any help today     Morbid obesity (H)  Encouraged regular exercises and  Weight loss.          BMI:   Estimated body mass index is 39.7 kg/m  as calculated from the following:    Height as of this encounter: 1.74 m (5' 8.5\").    Weight as of this encounter: 120.2 kg (265 lb).   Weight management plan: Discussed healthy diet and exercise guidelines    See Patient Instructions    Return in about 1 month (around 2/27/2023).    Elena " "MD Senait  [facility] TANA Chew is a 62 year old, presenting for the following health issues:  Recheck Medication, RECHECK (Would like labs to rule out diabetes and kidney ), and Knee Pain      Knee Pain    History of Present Illness       Reason for visit:  Sleepy loss of focus, lower leg swelling, eye conjuntivitis, trouble sleeping, high blood, etc  Symptom onset:  More than a month  Symptoms include:  Tired neuropathy pain in knee  Symptom intensity:  Moderate  Symptom progression:  Worsening  Had these symptoms before:  No    He eats 0-1 servings of fruits and vegetables daily.He consumes 0 sweetened beverage(s) daily.He exercises with enough effort to increase his heart rate 9 or less minutes per day.  He exercises with enough effort to increase his heart rate 4 days per week. He is missing 7 dose(s) of medications per week.     Blood pressures medications - out of them for more than a month.   \" Like beer a lot- I love it. I would be fine if I stopped.\"  I am prediabetic and have neuropathy due to it .  I have swelling in my ankles all the time. I am concerned about kidneys  I went 18 months with out covid and then had covid twice and was down with the illness  I was told I have reactive arthritis  I worry about my prediabetes.   My daughter is a nurse who recommended GFR, CMP and a1c.  Knee clicks and snaps due to injury that happened 5 yrs ago. I have to count steps I walk. It pops and clicks. It swelled up .  My right knee is shot. I have been taking glucosamine , chondroitin  Which tends to help. I am wondering about referral.      Hypertension Follow-up      Do you check your blood pressure regularly outside of the clinic? Yes     Are you following a low salt diet? Yes    Are your blood pressures ever more than 140 on the top number (systolic) OR more   than 90 on the bottom number (diastolic), for example 140/90? Yes      Review of Systems   Constitutional, HEENT, " "cardiovascular, pulmonary, GI, , musculoskeletal, neuro, skin, endocrine and psych systems are negative, except as otherwise noted.      Objective    BP (!) 150/98 (BP Location: Left arm, Patient Position: Sitting, Cuff Size: Adult Large)   Pulse 91   Temp 97.8  F (36.6  C) (Temporal)   Resp 22   Ht 1.74 m (5' 8.5\")   Wt 120.2 kg (265 lb)   SpO2 95%   BMI 39.70 kg/m    Body mass index is 39.7 kg/m .  Physical Exam   GENERAL: healthy, alert and no distress  NECK: no adenopathy, no asymmetry, masses, or scars and thyroid normal to palpation  RESP: lungs clear to auscultation - no rales, rhonchi or wheezes  CV: regular rate and rhythm, normal S1 S2, no S3 or S4, no murmur, click or rub, no peripheral edema and peripheral pulses strong  ABDOMEN: soft, nontender, no hepatosplenomegaly, no masses and bowel sounds normal  MS: TTP along the medial joint line.  Limited ROM                "

## 2023-01-30 NOTE — RESULT ENCOUNTER NOTE
Mr. Lopez    Your recent test results are attached. Normal kidney and liver function , normal thyroid test.     If you have any questions or concerns please contact me via My Chart or call the clinic at [phone]     Thank You  Elena Sapp MD.

## 2023-01-30 NOTE — RESULT ENCOUNTER NOTE
Contacted patient with test results and relayed any advice listed below from the provider. Provided patient with phone number for Ortho scheduling.

## 2023-02-01 ENCOUNTER — TELEPHONE (OUTPATIENT)
Dept: SURGERY | Facility: CLINIC | Age: 63
End: 2023-02-01

## 2023-02-01 ENCOUNTER — OFFICE VISIT (OUTPATIENT)
Dept: SURGERY | Facility: CLINIC | Age: 63
End: 2023-02-01
Payer: COMMERCIAL

## 2023-02-01 VITALS
BODY MASS INDEX: 38.8 KG/M2 | OXYGEN SATURATION: 96 % | HEIGHT: 69 IN | RESPIRATION RATE: 16 BRPM | TEMPERATURE: 96.2 F | SYSTOLIC BLOOD PRESSURE: 154 MMHG | WEIGHT: 262 LBS | DIASTOLIC BLOOD PRESSURE: 98 MMHG | HEART RATE: 82 BPM

## 2023-02-01 DIAGNOSIS — K43.9 VENTRAL HERNIA WITHOUT OBSTRUCTION OR GANGRENE: ICD-10-CM

## 2023-02-01 PROCEDURE — 99215 OFFICE O/P EST HI 40 MIN: CPT | Performed by: SURGERY

## 2023-02-01 ASSESSMENT — PAIN SCALES - GENERAL: PAINLEVEL: MODERATE PAIN (4)

## 2023-02-01 NOTE — LETTER
2/1/2023         RE: Jeevan Lopez  [address] [facility] [address]        Dear Colleague,    Thank you for referring your patient, Jeevan Lopez, to the [facility]. Please see a copy of my visit note below.    Patient seen in consultation for ventral hernia by Alistair Martinez    HPI:  Patient is a 62 year old male with many year history of ventral hernia.  I saw him previously several years ago and recommended weight loss with subsequent surgical repair.  Initially apparently he had some success with weight loss but he has regained this.  He now endorses worsening pain and more episodes of protrusion where he has to manually reduce the hernia.  He denies obstructive symptoms.  He thinks that he used mesh during his open umbilical hernia pair in the past.  He had CT scan in 2015 which shows a subtle supraumbilical hernia.  He is not a diabetic and not a smoker.    Review Of Systems    Skin: negative  Ears/Nose/Throat: negative  Respiratory: No shortness of breath, dyspnea on exertion, cough, or hemoptysis  Cardiovascular: negative  Gastrointestinal: negative  Genitourinary: negative  Musculoskeletal: negative  Neurologic: negative  Hematologic/Lymphatic/Immunologic: negative  Endocrine: negative      Past Medical History:   Diagnosis Date     Adenomatous polyp of colon 9/2013    q 5 yr colonoscopy     Bell's palsy 1/17/2007     Deviated septum 9/17/2013    See CT scan     Hypertension      LVH (left ventricular hypertrophy) 6/7/2011    see stress echo     NI (obstructive sleep apnea) 2016    moderate - dental appliance     Sensory polyneuropathy 2014    feet - see neuro consult     VENTRAL HERNIA NEC 6/19/2007       Past Surgical History:   Procedure Laterality Date     COLONOSCOPY  9/30/2013    Procedure: COMBINED COLONOSCOPY, SINGLE BIOPSY/POLYPECTOMY BY BIOPSY;  colonoscopy, polypectomy by biopsy;  Surgeon: Pedro Chris MD;  Location:  GI     COLONOSCOPY N/A 8/7/2017     Procedure: COLONOSCOPY;  colonoscopy;  Surgeon: Kiel Solomon MD;  Location: [facility]     EXCISE MASS HEAD  11/18/2013    Procedure: EXCISE MASS HEAD;  Excision of left forehead mass;  Surgeon: Pelon Echavarria MD;  Location: [facility]     LASER YAG CAPSULOTOMY Left 6/2/2016    Procedure: LASER YAG CAPSULOTOMY;  Surgeon: Joss Raza MD;  [facility]     PHACOEMULSIFICATION WITH STANDARD INTRAOCULAR LENS IMPLANT Left 11/5/2015    Procedure: PHACOEMULSIFICATION WITH STANDARD INTRAOCULAR LENS IMPLANT;  Surgeon: Joss Raza MD;  Location: [facility]     ZZHC REPAIR UMBILICAL YVONNE,5+Y/O, INCARCERATED OR STRANGULATED  6/9/2004       Family History   Problem Relation Age of Onset     Diabetes Father      Hypertension Father      Heart Disease Father         double bypass-smoker     Cancer Father         lung, brain     Hypertension Brother      Hypertension Mother      Unknown/Adopted Maternal Grandmother      Unknown/Adopted Maternal Grandfather      Unknown/Adopted Paternal Grandmother      Unknown/Adopted Paternal Grandfather        Social History     Socioeconomic History     Marital status:      Spouse name: Not on file     Number of children: Not on file     Years of education: Not on file     Highest education level: Not on file   Occupational History     Not on file   Tobacco Use     Smoking status: Never     Smokeless tobacco: Never   Substance and Sexual Activity     Alcohol use: Yes     Alcohol/week: 0.0 standard drinks     Comment: 24 beers per week     Drug use: No     Sexual activity: Yes     Partners: Female     Comment: single, 1 child, work - self employed   Other Topics Concern     Parent/sibling w/ CABG, MI or angioplasty before 65F 55M? Not Asked   Social History Narrative     Not on file     Social Determinants of Health     Financial Resource Strain: Not on file   Food Insecurity: Not on file   Transportation Needs: Not on file   Physical Activity: Not on file   Stress:  "Not on file   Social Connections: Not on file   Intimate Partner Violence: Not on file   Housing Stability: Not on file       Current Outpatient Medications   Medication Sig Dispense Refill     amLODIPine (NORVASC) 10 MG tablet Take 1 tablet (10 mg) by mouth daily 90 tablet 1     aspirin (ASA) 81 MG tablet Take 1 tablet by mouth daily.       cyanocobalamin (VITAMIN B-12) 1000 MCG tablet Take by mouth daily       hydrochlorothiazide (HYDRODIURIL) 25 MG tablet Take 1 tablet (25 mg) by mouth daily 90 tablet 1     NONFORMULARY        omeprazole 20 MG tablet Take 1 tablet (20 mg) by mouth daily 90 tablet 4     Vitamin A 7.5 MG (56044 UT) CAPS        vitamin D3 (CHOLECALCIFEROL) 250 mcg (00255 units) capsule Take 1 capsule by mouth daily         Medications and history reviewed    Physical exam:  Vitals: BP (!) 154/98   Pulse 82   Temp (!) 96.2  F (35.7  C) (Temporal)   Resp 16   Ht 1.74 m (5' 8.5\")   Wt 118.8 kg (262 lb)   SpO2 96%   BMI 39.26 kg/m    BMI= Body mass index is 39.26 kg/m .    Constitutional: Healthy, alert, non-distressed   Head: Normo-cephalic, atraumatic, no lesions, masses or tenderness   Cardiovascular: RRR, no new murmurs, +S1, +S2 heart sounds, no clicks, rubs or gallops   Respiratory: CTAB, no rales, rhonchi or wheezing, equal chest rise, good respiratory effort   Gastrointestinal: Soft, obese, non-tender, non distended, no rebound rigidity or guarding, small soft and reducible mid upper abdomen hernia.  Diastases recti   : Deferred  Musculoskeletal: Moves all extremities, normal  strength, no deformities noted   Skin: No suspicious lesions or rashes   Psychiatric: Mentation appears normal, affect appropriate   Hematologic/Lymphatic/Immunologic: Normal cervical and supraclavicular lymph nodes   Patient able to get up on table without difficulty.    Labs show:  No results found for this or any previous visit (from the past 24 hour(s)).    Imaging shows:  Recent Results (from the past 744 " hour(s))   XR Knee Right 3 Views    Narrative    KNEE RIGHT 3 VIEWS   1/27/2023 12:11 PM     HISTORY: Chronic pain of right knee.    COMPARISON: Knee x-rays dated 10/10/2011.    FINDINGS: There is moderate to severe medial compartment joint space  loss of the right knee with mild varus angulation of the knee.  Fragmented ossifications are seen in the knee extending to the tibial  tubercle most consistent with chronic Osgood-Schlatter's disease.  Small ossification is noted just distal to the patella on the lateral  view only and could represent an ossified intra-articular free body.  There are moderate size osteophytes in the medial and lateral  compartments of the knee with small to moderate size osteophytes in  the patellofemoral compartment of the knee. No joint effusion or acute  osseous fracture.      Impression    IMPRESSION:    1. Severe medial compartment and mild to moderate lateral and  patellofemoral compartment degenerative joint disease of the right  knee. These degenerative changes have significantly progressed since  the prior study dated 10/10/2011 in all 3 compartments of the knee.   2. Evidence of chronic Osgood-Schlatter's disease.  3. Question ossified intra-articular free body.    CAROLE BOYCE MD         SYSTEM ID:  [id number]        Assessment:     ICD-10-CM    1. Ventral hernia without obstruction or gangrene  K43.9 Adult General Surg Referral        Plan: We discussed hernia and the natural course.  We also discussed modifiable risk factors like smoking, diabetes and obesity.  He understands that should we proceed with surgery he would be at increased risk of perioperative complication and recurrence due to his ongoing obesity.  He feels as though the discomfort from the hernia is limiting his ability to successfully lose weight and wishes to discuss surgery.  After discussion I ultimately recommended robot-assisted laparoscopic ventral hernia repair with mesh. We discussed the procedure in  detail. We also discussed the risks, benefits, alternatives and post-op care and restrictions. After our informed discussion we decided to proceed with the proposed surgery.    Risks of surgery discussed including, but not limited to bleeding, infection, recurrence, damage to nerves and what is in the hernia sac.  Risks of anesthesia also discussed..  Although mesh is a better long term repair if it gets infected it must be removed.  If there is evidence of an infection at time of surgery it will be cancelled and rescheduled for when infection has resolved.     Discussed massaging hernia back in and using ice if becomes more painful.  If not able to reduce then go to emergency room.      45 minutes spent on the date of the encounter doing chart review, history and exam, documentation and further activities per the note    Clif Lua, DO        Again, thank you for allowing me to participate in the care of your patient.        Sincerely,        Clif Lua, DO

## 2023-02-01 NOTE — TELEPHONE ENCOUNTER
Type of surgery: Robot-assisted laparoscopic ventral hernia repair with mesh    Location of surgery: [facility]  Date and time of surgery: 3/10  Surgeon: Stoney  Pre-Op Appt Date: 2/20  Post-Op Appt Date: 3/17   Packet sent out: Yes  Pre-cert/Authorization completed:  Not Applicable  Date: na

## 2023-02-01 NOTE — PROGRESS NOTES
Patient seen in consultation for ventral hernia by Alistair Martinez    HPI:  Patient is a 62 year old male with many year history of ventral hernia.  I saw him previously several years ago and recommended weight loss with subsequent surgical repair.  Initially apparently he had some success with weight loss but he has regained this.  He now endorses worsening pain and more episodes of protrusion where he has to manually reduce the hernia.  He denies obstructive symptoms.  He thinks that he used mesh during his open umbilical hernia pair in the past.  He had CT scan in 2015 which shows a subtle supraumbilical hernia.  He is not a diabetic and not a smoker.    Review Of Systems    Skin: negative  Ears/Nose/Throat: negative  Respiratory: No shortness of breath, dyspnea on exertion, cough, or hemoptysis  Cardiovascular: negative  Gastrointestinal: negative  Genitourinary: negative  Musculoskeletal: negative  Neurologic: negative  Hematologic/Lymphatic/Immunologic: negative  Endocrine: negative      Past Medical History:   Diagnosis Date     Adenomatous polyp of colon 9/2013    q 5 yr colonoscopy     Bell's palsy 1/17/2007     Deviated septum 9/17/2013    See CT scan     Hypertension      LVH (left ventricular hypertrophy) 6/7/2011    see stress echo     NI (obstructive sleep apnea) 2016    moderate - dental appliance     Sensory polyneuropathy 2014    feet - see neuro consult     VENTRAL HERNIA NEC 6/19/2007       Past Surgical History:   Procedure Laterality Date     COLONOSCOPY  9/30/2013    Procedure: COMBINED COLONOSCOPY, SINGLE BIOPSY/POLYPECTOMY BY BIOPSY;  colonoscopy, polypectomy by biopsy;  Surgeon: Pedro Chris MD;  Location:  GI     COLONOSCOPY N/A 8/7/2017    Procedure: COLONOSCOPY;  colonoscopy;  Surgeon: Kiel Solomon MD;  Location:  GI     EXCISE MASS HEAD  11/18/2013    Procedure: EXCISE MASS HEAD;  Excision of left forehead mass;  Surgeon: Pelon Echavarria MD;  Location:  OR      LASER YAG CAPSULOTOMY Left 6/2/2016    Procedure: LASER YAG CAPSULOTOMY;  Surgeon: Joss Raza MD;  [facility]     PHACOEMULSIFICATION WITH STANDARD INTRAOCULAR LENS IMPLANT Left 11/5/2015    Procedure: PHACOEMULSIFICATION WITH STANDARD INTRAOCULAR LENS IMPLANT;  Surgeon: Joss Raza MD;  Location[facility]     ZZHC REPAIR UMBILICAL YVONNE,5+Y/O, INCARCERATED OR STRANGULATED  6/9/2004       Family History   Problem Relation Age of Onset     Diabetes Father      Hypertension Father      Heart Disease Father         double bypass-smoker     Cancer Father         lung, brain     Hypertension Brother      Hypertension Mother      Unknown/Adopted Maternal Grandmother      Unknown/Adopted Maternal Grandfather      Unknown/Adopted Paternal Grandmother      Unknown/Adopted Paternal Grandfather        Social History     Socioeconomic History     Marital status:      Spouse name: Not on file     Number of children: Not on file     Years of education: Not on file     Highest education level: Not on file   Occupational History     Not on file   Tobacco Use     Smoking status: Never     Smokeless tobacco: Never   Substance and Sexual Activity     Alcohol use: Yes     Alcohol/week: 0.0 standard drinks     Comment: 24 beers per week     Drug use: No     Sexual activity: Yes     Partners: Female     Comment: single, 1 child, work - self employed   Other Topics Concern     Parent/sibling w/ CABG, MI or angioplasty before 65F 55M? Not Asked   Social History Narrative     Not on file     Social Determinants of Health     Financial Resource Strain: Not on file   Food Insecurity: Not on file   Transportation Needs: Not on file   Physical Activity: Not on file   Stress: Not on file   Social Connections: Not on file   Intimate Partner Violence: Not on file   Housing Stability: Not on file       Current Outpatient Medications   Medication Sig Dispense Refill     amLODIPine (NORVASC) 10 MG tablet Take 1 tablet (10  "mg) by mouth daily 90 tablet 1     aspirin (ASA) 81 MG tablet Take 1 tablet by mouth daily.       cyanocobalamin (VITAMIN B-12) 1000 MCG tablet Take by mouth daily       hydrochlorothiazide (HYDRODIURIL) 25 MG tablet Take 1 tablet (25 mg) by mouth daily 90 tablet 1     NONFORMULARY        omeprazole 20 MG tablet Take 1 tablet (20 mg) by mouth daily 90 tablet 4     Vitamin A 7.5 MG (79290 UT) CAPS        vitamin D3 (CHOLECALCIFEROL) 250 mcg (90431 units) capsule Take 1 capsule by mouth daily         Medications and history reviewed    Physical exam:  Vitals: BP (!) 154/98   Pulse 82   Temp (!) 96.2  F (35.7  C) (Temporal)   Resp 16   Ht 1.74 m (5' 8.5\")   Wt 118.8 kg (262 lb)   SpO2 96%   BMI 39.26 kg/m    BMI= Body mass index is 39.26 kg/m .    Constitutional: Healthy, alert, non-distressed   Head: Normo-cephalic, atraumatic, no lesions, masses or tenderness   Cardiovascular: RRR, no new murmurs, +S1, +S2 heart sounds, no clicks, rubs or gallops   Respiratory: CTAB, no rales, rhonchi or wheezing, equal chest rise, good respiratory effort   Gastrointestinal: Soft, obese, non-tender, non distended, no rebound rigidity or guarding, small soft and reducible mid upper abdomen hernia.  Diastases recti   : Deferred  Musculoskeletal: Moves all extremities, normal  strength, no deformities noted   Skin: No suspicious lesions or rashes   Psychiatric: Mentation appears normal, affect appropriate   Hematologic/Lymphatic/Immunologic: Normal cervical and supraclavicular lymph nodes   Patient able to get up on table without difficulty.    Labs show:  No results found for this or any previous visit (from the past 24 hour(s)).    Imaging shows:  Recent Results (from the past 744 hour(s))   XR Knee Right 3 Views    Narrative    KNEE RIGHT 3 VIEWS   1/27/2023 12:11 PM     HISTORY: Chronic pain of right knee.    COMPARISON: Knee x-rays dated 10/10/2011.    FINDINGS: There is moderate to severe medial compartment joint " space  loss of the right knee with mild varus angulation of the knee.  Fragmented ossifications are seen in the knee extending to the tibial  tubercle most consistent with chronic Osgood-Schlatter's disease.  Small ossification is noted just distal to the patella on the lateral  view only and could represent an ossified intra-articular free body.  There are moderate size osteophytes in the medial and lateral  compartments of the knee with small to moderate size osteophytes in  the patellofemoral compartment of the knee. No joint effusion or acute  osseous fracture.      Impression    IMPRESSION:    1. Severe medial compartment and mild to moderate lateral and  patellofemoral compartment degenerative joint disease of the right  knee. These degenerative changes have significantly progressed since  the prior study dated 10/10/2011 in all 3 compartments of the knee.   2. Evidence of chronic Osgood-Schlatter's disease.  3. Question ossified intra-articular free body.    CAROLE BOYCE MD         SYSTEM ID:  [id number]        Assessment:     ICD-10-CM    1. Ventral hernia without obstruction or gangrene  K43.9 Adult General Surg Referral        Plan: We discussed hernia and the natural course.  We also discussed modifiable risk factors like smoking, diabetes and obesity.  He understands that should we proceed with surgery he would be at increased risk of perioperative complication and recurrence due to his ongoing obesity.  He feels as though the discomfort from the hernia is limiting his ability to successfully lose weight and wishes to discuss surgery.  After discussion I ultimately recommended robot-assisted laparoscopic ventral hernia repair with mesh. We discussed the procedure in detail. We also discussed the risks, benefits, alternatives and post-op care and restrictions. After our informed discussion we decided to proceed with the proposed surgery.    Risks of surgery discussed including, but not limited to bleeding,  infection, recurrence, damage to nerves and what is in the hernia sac.  Risks of anesthesia also discussed..  Although mesh is a better long term repair if it gets infected it must be removed.  If there is evidence of an infection at time of surgery it will be cancelled and rescheduled for when infection has resolved.     Discussed massaging hernia back in and using ice if becomes more painful.  If not able to reduce then go to emergency room.      45 minutes spent on the date of the encounter doing chart review, history and exam, documentation and further activities per the note    Clif Lua, DO

## 2023-02-01 NOTE — NURSING NOTE
Chief Complaint   Patient presents with     Consult     Ventral hernia ongoing for about 10 years     Jeevan to follow up with Primary Care provider regarding elevated blood pressure.
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
## 2023-02-06 ENCOUNTER — OFFICE VISIT (OUTPATIENT)
Dept: ORTHOPEDICS | Facility: CLINIC | Age: 63
End: 2023-02-06
Attending: FAMILY MEDICINE
Payer: COMMERCIAL

## 2023-02-06 VITALS — WEIGHT: 262 LBS | BODY MASS INDEX: 39.26 KG/M2

## 2023-02-06 DIAGNOSIS — M17.11 PRIMARY OSTEOARTHRITIS OF RIGHT KNEE: Primary | ICD-10-CM

## 2023-02-06 PROCEDURE — 99204 OFFICE O/P NEW MOD 45 MIN: CPT | Performed by: STUDENT IN AN ORGANIZED HEALTH CARE EDUCATION/TRAINING PROGRAM

## 2023-02-06 ASSESSMENT — PAIN SCALES - GENERAL: PAINLEVEL: MILD PAIN (3)
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
## 2023-02-06 NOTE — PATIENT INSTRUCTIONS
Hilary Lopez ,     You were seen today for your right knee pain secondary to moderate to severe primary osteoarthritis.   As discussed in clinic, treatment for knee osteoarthritis can include rest, activity modification, oral and topical anti-inflammatories, bracing, physical therapy, injections, and eventually replacement surgery.  Discussed all of the nonoperative/conservative care options, and preferences for discussing surgical options as soon as possible.  You may use non-steroidal anti-inflammatory (NSAID) medications as needed for pain relief.  Ibuprofen, naproxen are good choices, Tylenol is a good alternative.  You may try topical medications such as IcyHot, BioFreeze, Bengay, or Voltaren gel as well, which may help your symptoms.   Discussed the benefits of Physical Therapy or Home Exercise Program for flexibility, strengthening, and stabilization.  Previously completed physical therapy, continue home exercise program as instructed by your physical therapist.  Discussed injection options that include corticosteroid, viscosupplementation, PRP, stem cells.  Would prefer to defer for now in favor of discussing surgical options, consider in the future if needed.  Discussed bracing options and recommend using an  brace.  Deferred for now, consider in the future if needed.  Preference is for discussing replacement options with a knee surgeon, so orthopedic  referral was placed today.  A representative from our Orthopedic  team should be reaching out in the next 1 - 2 days to help coordinate this appointment.  In event that you are not contacted, you may also contact them at [phone].   Follow up as needed in the future for re-evaluation and update to treatment plan.   Please call the clinic for any questions or concerns.    It was a pleasure seeing you today. Thank you for choosing [facility] for your care.     Sincerely,   Gerardo Crockett, DO  [facility] - Sports  Medicine  [facility] - Department of Orthopedic Surgery

## 2023-02-13 ENCOUNTER — TELEPHONE (OUTPATIENT)
Dept: ORTHOPEDICS | Facility: CLINIC | Age: 63
End: 2023-02-13
Payer: COMMERCIAL

## 2023-02-13 NOTE — TELEPHONE ENCOUNTER
2/13 Explained we need to reschedule appointment on 2/16 with Dr. Phelps.     Patient needs to reschedule to Dr. Burdick.     Provided phone number [phone] to reschedule.     Lizzy deng Procedure   Orthopedics, Podiatry, Sports Medicine, Ent ,Eye , Audiology, Adult Endocrine & Diabetes, Nutrition & Medication Therapy Management Specialties   [facility]

## 2023-02-13 NOTE — TELEPHONE ENCOUNTER
DIAGNOSIS: Primary osteoarthritis of right knee [M17.11]   APPOINTMENT DATE: 02/21/2023   NOTES STATUS DETAILS   OFFICE NOTE from referring provider Internal 02/06/2023 Dr Crockett [facility]    OFFICE NOTE from other specialist Internal 01/27/2023 Dr Manjarrez    DISCHARGE SUMMARY from hospital N/A    DISCHARGE REPORT from the ER N/A    OPERATIVE REPORT N/A    MEDICATION LIST N/A    EMG (for Spine) N/A    IMPLANT RECORD/STICKER N/A    LABS     CBC/DIFF N/A    CULTURES N/A    INJECTIONS DONE IN RADIOLOGY N/A    MRI N/A    CT SCAN N/A    XRAYS (IMAGES & REPORTS) Internal 01/27/2023 RT knee   TUMOR     PATHOLOGY  Slides & report N/A

## 2023-02-18 ENCOUNTER — HEALTH MAINTENANCE LETTER (OUTPATIENT)
Age: 63
End: 2023-02-18

## 2023-02-20 ENCOUNTER — TRANSFERRED RECORDS (OUTPATIENT)
Dept: HEALTH INFORMATION MANAGEMENT | Facility: CLINIC | Age: 63
End: 2023-02-20

## 2023-02-20 ENCOUNTER — OFFICE VISIT (OUTPATIENT)
Dept: FAMILY MEDICINE | Facility: OTHER | Age: 63
End: 2023-02-20
Payer: COMMERCIAL

## 2023-02-20 VITALS
BODY MASS INDEX: 40.34 KG/M2 | RESPIRATION RATE: 16 BRPM | HEART RATE: 86 BPM | OXYGEN SATURATION: 95 % | SYSTOLIC BLOOD PRESSURE: 132 MMHG | TEMPERATURE: 97.5 F | DIASTOLIC BLOOD PRESSURE: 88 MMHG | WEIGHT: 257 LBS | HEIGHT: 67 IN

## 2023-02-20 DIAGNOSIS — G62.9 PERIPHERAL POLYNEUROPATHY: ICD-10-CM

## 2023-02-20 DIAGNOSIS — K43.9 VENTRAL HERNIA WITHOUT OBSTRUCTION OR GANGRENE: ICD-10-CM

## 2023-02-20 DIAGNOSIS — Z13.220 SCREENING FOR LIPOID DISORDERS: ICD-10-CM

## 2023-02-20 DIAGNOSIS — Z01.818 PREOP GENERAL PHYSICAL EXAM: Primary | ICD-10-CM

## 2023-02-20 PROCEDURE — 93000 ELECTROCARDIOGRAM COMPLETE: CPT | Performed by: FAMILY MEDICINE

## 2023-02-20 PROCEDURE — 99214 OFFICE O/P EST MOD 30 MIN: CPT | Performed by: FAMILY MEDICINE

## 2023-02-20 ASSESSMENT — PAIN SCALES - GENERAL: PAINLEVEL: NO PAIN (0)

## 2023-02-20 NOTE — H&P (VIEW-ONLY)
[address]  [facility] [address]  Phone: [phone]  Primary Provider: Alistair Martinez  Pre-op Performing Provider: GINO QUINTEROS    {Provider  Link to PREOP SmartSet    PREOPERATIVE EVALUATION:  Today's date: 2/20/2023    Jeevan Lopez is a 62 year old male who presents for a preoperative evaluation.    Surgical Information:  Surgery/Procedure: Robot-assisted laparoscopic ventral hernia repair with mesh  Surgery Location: [facility]  Surgeon: Dr. Mclain  Surgery Date: 3/10/23  Time of Surgery: 12  Where patient plans to recover: At home with family  Fax number for surgical facility: Note does not need to be faxed, will be available electronically in Epic.    Type of Anesthesia Anticipated: General    Assessment & Plan     The proposed surgical procedure is considered INTERMEDIATE risk.    Problem List Items Addressed This Visit    None  Visit Diagnoses     Preop general physical exam    -  Primary    Relevant Orders    Basic metabolic panel  (Ca, Cl, CO2, Creat, Gluc, K, Na, BUN)    Hemoglobin and hematocrit    EKG 12-lead complete w/read - Clinics (Completed)    Ventral hernia without obstruction or gangrene        Screening for lipoid disorders        Relevant Orders    Lipid panel reflex to direct LDL Fasting    Peripheral polyneuropathy        Relevant Orders    Vitamin B12                 Risks and Recommendations:  The patient has the following additional risks and recommendations for perioperative complications:   - Morbid obesity (BMI >40)  Obstructive Sleep Apnea:       Medication Instructions:   - aspirin: Discontinue aspirin 7-10 days prior to procedure to reduce bleeding risk. It should be resumed postoperatively.    - Calcium Channel Blockers: May be continued on the day of surgery.   - Diuretics: HOLD on the day of surgery.    RECOMMENDATION:  APPROVAL GIVEN to proceed with proposed procedure, without further diagnostic  evaluation.      Subjective     HPI related to upcoming procedure: patient with a ventral hernia is scheduled for repair. Here for preop    Preop Questions 2/20/2023   1. Have you ever had a heart attack or stroke? No   2. Have you ever had surgery on your heart or blood vessels, such as a stent placement, a coronary artery bypass, or surgery on an artery in your head, neck, heart, or legs? No   3. Do you have chest pain with activity? No   4. Do you have a history of  heart failure? No   5. Do you currently have a cold, bronchitis or symptoms of other infection? No   6. Do you have a cough, shortness of breath, or wheezing? No   7. Do you or anyone in your family have previous history of blood clots? No   8. Do you or does anyone in your family have a serious bleeding problem such as prolonged bleeding following surgeries or cuts? No   9. Have you ever had problems with anemia or been told to take iron pills? No   10. Have you had any abnormal blood loss such as black, tarry or bloody stools? No   11. Have you ever had a blood transfusion? No   12. Are you willing to have a blood transfusion if it is medically needed before, during, or after your surgery? Yes   13. Have you or any of your relatives ever had problems with anesthesia? No   14. Do you have sleep apnea, excessive snoring or daytime drowsiness? YES - Uses CPAP consistently   14a. Do you have a CPAP machine? Yes   15. Do you have any artifical heart valves or other implanted medical devices like a pacemaker, defibrillator, or continuous glucose monitor? No   16. Do you have artificial joints? No   17. Are you allergic to latex? No       Health Care Directive:  Patient has a Health Care Directive on file      Preoperative Review of :   reviewed - no record of controlled substances prescribed.      Status of Chronic Conditions:  See problem list for active medical problems.  Problems all longstanding and stable, except as noted/documented.  See ROS  for pertinent symptoms related to these conditions.    HYPERTENSION - Patient has longstanding history of HTN , currently denies any symptoms referable to elevated blood pressure. Specifically denies chest pain, palpitations, dyspnea, orthopnea, PND or peripheral edema. Blood pressure readings have been in normal range. Current medication regimen is as listed below. Patient denies any side effects of medication.     NI- currently on CPAP        Review of Systems  CONSTITUTIONAL: NEGATIVE for fever, chills, change in weight  INTEGUMENTARY/SKIN: NEGATIVE for worrisome rashes, moles or lesions  EYES: NEGATIVE for vision changes or irritation  ENT/MOUTH: NEGATIVE for ear, mouth and throat problems  RESP: NEGATIVE for significant cough or SOB  CV: NEGATIVE for chest pain, palpitations or peripheral edema  GI: NEGATIVE for nausea, abdominal pain, heartburn, or change in bowel habits  : NEGATIVE for frequency, dysuria, or hematuria  MUSCULOSKELETAL: NEGATIVE for significant arthralgias or myalgia  NEURO: NEGATIVE for weakness, dizziness or paresthesias  ENDOCRINE: NEGATIVE for temperature intolerance, skin/hair changes  HEME: NEGATIVE for bleeding problems  PSYCHIATRIC: NEGATIVE for changes in mood or affect    Patient Active Problem List    Diagnosis Date Noted     Meningioma (H) 01/27/2023     Priority: Medium     Morbid obesity (H) 07/29/2017     Priority: Medium     Alcoholic peripheral neuropathy (H) 07/29/2017     Priority: Medium     Alcoholism /alcohol abuse 07/29/2017     Priority: Medium     Replacing diagnoses that were inactivated after the 10/1/2021 regulatory import.       NI (obstructive sleep apnea)      Priority: Medium     moderate - Using CPAP consistently for the past 2 yrs       Anxiety 07/21/2014     Priority: Medium     ED (erectile dysfunction) 02/07/2014     Priority: Medium     Deviated septum 09/17/2013     Priority: Medium     See CT scan       Chronic rhinitis 09/09/2013     Priority:  Medium     GERD (gastroesophageal reflux disease) 09/09/2013     Priority: Medium     Adenomatous polyp of colon 09/01/2013     Priority: Medium     q 5 yr colonoscopy       Hyperlipidemia LDL goal <130 05/10/2011     Priority: Medium     Hypertension goal BP (blood pressure) < 140/90 12/20/2010     Priority: Medium      Past Medical History:   Diagnosis Date     Adenomatous polyp of colon 9/2013    q 5 yr colonoscopy     Bell's palsy 1/17/2007     Deviated septum 9/17/2013    See CT scan     Hypertension      LVH (left ventricular hypertrophy) 6/7/2011    see stress echo     NI (obstructive sleep apnea) 2016    moderate - dental appliance     Sensory polyneuropathy 2014    feet - see neuro consult     VENTRAL HERNIA NEC 6/19/2007     Past Surgical History:   Procedure Laterality Date     COLONOSCOPY  9/30/2013    Procedure: COMBINED COLONOSCOPY, SINGLE BIOPSY/POLYPECTOMY BY BIOPSY;  colonoscopy, polypectomy by biopsy;  Surgeon: Pedro Chris MD;  Location: [facility]     COLONOSCOPY N/A 8/7/2017    Procedure: COLONOSCOPY;  colonoscopy;  Surgeon: Kiel Solomon MD;  Location: [facility]     EXCISE MASS HEAD  11/18/2013    Procedure: EXCISE MASS HEAD;  Excision of left forehead mass;  Surgeon: Pelon Echavarria MD;  Location[facility]     LASER YAG CAPSULOTOMY Left 6/2/2016    Procedure: LASER YAG CAPSULOTOMY;  Surgeon: Joss Raza MD;  [facility]     PHACOEMULSIFICATION WITH STANDARD INTRAOCULAR LENS IMPLANT Left 11/5/2015    Procedure: PHACOEMULSIFICATION WITH STANDARD INTRAOCULAR LENS IMPLANT;  Surgeon: Joss Raza MD;  Location: [facility]     ZZHC REPAIR UMBILICAL YVONNE,5+Y/O, INCARCERATED OR STRANGULATED  6/9/2004     Current Outpatient Medications   Medication Sig Dispense Refill     amLODIPine (NORVASC) 10 MG tablet Take 1 tablet (10 mg) by mouth daily 90 tablet 1     aspirin (ASA) 81 MG tablet Take 1 tablet by mouth daily.       cyanocobalamin (VITAMIN B-12) 1000 MCG tablet Take by mouth  "daily       hydrochlorothiazide (HYDRODIURIL) 25 MG tablet Take 1 tablet (25 mg) by mouth daily 90 tablet 1     omeprazole 20 MG tablet Take 1 tablet (20 mg) by mouth daily 90 tablet 4     Vitamin A 7.5 MG (75581 UT) CAPS        vitamin D3 (CHOLECALCIFEROL) 250 mcg (76091 units) capsule Take 1 capsule by mouth daily       NONFORMULARY          Allergies   Allergen Reactions     Lisinopril Other (See Comments)     Dry hacky cough        Social History     Tobacco Use     Smoking status: Never     Smokeless tobacco: Never   Substance Use Topics     Alcohol use: Yes     Alcohol/week: 0.0 standard drinks     Comment: 24 beers per week       History   Drug Use No         Objective     /88 (BP Location: Right arm, Patient Position: Sitting, Cuff Size: Adult Large)   Pulse 86   Temp 97.5  F (36.4  C) (Temporal)   Resp 16   Ht 1.706 m (5' 7.17\")   Wt 116.6 kg (257 lb)   SpO2 95%   BMI 40.05 kg/m      Physical Exam    GENERAL APPEARANCE: healthy, alert and no distress     EYES: EOMI,  PERRL     HENT: ear canals and TM's normal and nose and mouth without ulcers or lesions     NECK: no adenopathy, no asymmetry, masses, or scars and thyroid normal to palpation     RESP: lungs clear to auscultation - no rales, rhonchi or wheezes     CV: regular rates and rhythm, normal S1 S2, no S3 or S4 and no murmur, click or rub     ABDOMEN:  soft, nontender, no HSM or masses and bowel sounds normal     MS: extremities normal- no gross deformities noted, no evidence of inflammation in joints, FROM in all extremities.     SKIN: no suspicious lesions or rashes     NEURO: Normal strength and tone, sensory exam grossly normal, mentation intact and speech normal     PSYCH: mentation appears normal. and affect normal/bright     LYMPHATICS: No cervical adenopathy    Recent Labs   Lab Test 01/27/23  1213 09/23/21  1131 04/20/21  0925   HGB 16.5 14.3  --     224  --      --  138   POTASSIUM 4.2  --  4.2   CR 0.88  --  0.89 "   A1C 5.4  --   --         Diagnostics:  Labs pending at this time.  Results will be reviewed when available.   EKG: appears normal, NSR, left axis, normal intervals, no acute ST/T changes c/w ischemia, no LVH by voltage criteria.    Revised Cardiac Risk Index (RCRI):  The patient has the following serious cardiovascular risks for perioperative complications:   - No serious cardiac risks = 0 points     RCRI Interpretation: 0 points: Class I (very low risk - 0.4% complication rate)           Signed Electronically by: Elena Sapp MD  Copy of this evaluation report is provided to requesting physician.

## 2023-02-20 NOTE — PROGRESS NOTES
[address]  [facility] [address]  Phone: [phone]  Primary Provider: Alistair Martinez  Pre-op Performing Provider: GINO QUINTEROS    {Provider  Link to PREOP SmartSet    PREOPERATIVE EVALUATION:  Today's date: 2/20/2023    Jeevan Lopez is a 62 year old male who presents for a preoperative evaluation.    Surgical Information:  Surgery/Procedure: Robot-assisted laparoscopic ventral hernia repair with mesh  Surgery Location: [facility]  Surgeon: Dr. Mclain  Surgery Date: 3/10/23  Time of Surgery: 12  Where patient plans to recover: At home with family  Fax number for surgical facility: Note does not need to be faxed, will be available electronically in Epic.    Type of Anesthesia Anticipated: General    Assessment & Plan     The proposed surgical procedure is considered INTERMEDIATE risk.    Problem List Items Addressed This Visit    None  Visit Diagnoses     Preop general physical exam    -  Primary    Relevant Orders    Basic metabolic panel  (Ca, Cl, CO2, Creat, Gluc, K, Na, BUN)    Hemoglobin and hematocrit    EKG 12-lead complete w/read - Clinics (Completed)    Ventral hernia without obstruction or gangrene        Screening for lipoid disorders        Relevant Orders    Lipid panel reflex to direct LDL Fasting    Peripheral polyneuropathy        Relevant Orders    Vitamin B12                 Risks and Recommendations:  The patient has the following additional risks and recommendations for perioperative complications:   - Morbid obesity (BMI >40)  Obstructive Sleep Apnea:       Medication Instructions:   - aspirin: Discontinue aspirin 7-10 days prior to procedure to reduce bleeding risk. It should be resumed postoperatively.    - Calcium Channel Blockers: May be continued on the day of surgery.   - Diuretics: HOLD on the day of surgery.    RECOMMENDATION:  APPROVAL GIVEN to proceed with proposed procedure, without further diagnostic  evaluation.      Subjective     HPI related to upcoming procedure: patient with a ventral hernia is scheduled for repair. Here for preop    Preop Questions 2/20/2023   1. Have you ever had a heart attack or stroke? No   2. Have you ever had surgery on your heart or blood vessels, such as a stent placement, a coronary artery bypass, or surgery on an artery in your head, neck, heart, or legs? No   3. Do you have chest pain with activity? No   4. Do you have a history of  heart failure? No   5. Do you currently have a cold, bronchitis or symptoms of other infection? No   6. Do you have a cough, shortness of breath, or wheezing? No   7. Do you or anyone in your family have previous history of blood clots? No   8. Do you or does anyone in your family have a serious bleeding problem such as prolonged bleeding following surgeries or cuts? No   9. Have you ever had problems with anemia or been told to take iron pills? No   10. Have you had any abnormal blood loss such as black, tarry or bloody stools? No   11. Have you ever had a blood transfusion? No   12. Are you willing to have a blood transfusion if it is medically needed before, during, or after your surgery? Yes   13. Have you or any of your relatives ever had problems with anesthesia? No   14. Do you have sleep apnea, excessive snoring or daytime drowsiness? YES - Uses CPAP consistently   14a. Do you have a CPAP machine? Yes   15. Do you have any artifical heart valves or other implanted medical devices like a pacemaker, defibrillator, or continuous glucose monitor? No   16. Do you have artificial joints? No   17. Are you allergic to latex? No       Health Care Directive:  Patient has a Health Care Directive on file      Preoperative Review of :   reviewed - no record of controlled substances prescribed.      Status of Chronic Conditions:  See problem list for active medical problems.  Problems all longstanding and stable, except as noted/documented.  See ROS  for pertinent symptoms related to these conditions.    HYPERTENSION - Patient has longstanding history of HTN , currently denies any symptoms referable to elevated blood pressure. Specifically denies chest pain, palpitations, dyspnea, orthopnea, PND or peripheral edema. Blood pressure readings have been in normal range. Current medication regimen is as listed below. Patient denies any side effects of medication.     NI- currently on CPAP        Review of Systems  CONSTITUTIONAL: NEGATIVE for fever, chills, change in weight  INTEGUMENTARY/SKIN: NEGATIVE for worrisome rashes, moles or lesions  EYES: NEGATIVE for vision changes or irritation  ENT/MOUTH: NEGATIVE for ear, mouth and throat problems  RESP: NEGATIVE for significant cough or SOB  CV: NEGATIVE for chest pain, palpitations or peripheral edema  GI: NEGATIVE for nausea, abdominal pain, heartburn, or change in bowel habits  : NEGATIVE for frequency, dysuria, or hematuria  MUSCULOSKELETAL: NEGATIVE for significant arthralgias or myalgia  NEURO: NEGATIVE for weakness, dizziness or paresthesias  ENDOCRINE: NEGATIVE for temperature intolerance, skin/hair changes  HEME: NEGATIVE for bleeding problems  PSYCHIATRIC: NEGATIVE for changes in mood or affect    Patient Active Problem List    Diagnosis Date Noted     Meningioma (H) 01/27/2023     Priority: Medium     Morbid obesity (H) 07/29/2017     Priority: Medium     Alcoholic peripheral neuropathy (H) 07/29/2017     Priority: Medium     Alcoholism /alcohol abuse 07/29/2017     Priority: Medium     Replacing diagnoses that were inactivated after the 10/1/2021 regulatory import.       NI (obstructive sleep apnea)      Priority: Medium     moderate - Using CPAP consistently for the past 2 yrs       Anxiety 07/21/2014     Priority: Medium     ED (erectile dysfunction) 02/07/2014     Priority: Medium     Deviated septum 09/17/2013     Priority: Medium     See CT scan       Chronic rhinitis 09/09/2013     Priority:  Medium     GERD (gastroesophageal reflux disease) 09/09/2013     Priority: Medium     Adenomatous polyp of colon 09/01/2013     Priority: Medium     q 5 yr colonoscopy       Hyperlipidemia LDL goal <130 05/10/2011     Priority: Medium     Hypertension goal BP (blood pressure) < 140/90 12/20/2010     Priority: Medium      Past Medical History:   Diagnosis Date     Adenomatous polyp of colon 9/2013    q 5 yr colonoscopy     Bell's palsy 1/17/2007     Deviated septum 9/17/2013    See CT scan     Hypertension      LVH (left ventricular hypertrophy) 6/7/2011    see stress echo     NI (obstructive sleep apnea) 2016    moderate - dental appliance     Sensory polyneuropathy 2014    feet - see neuro consult     VENTRAL HERNIA NEC 6/19/2007     Past Surgical History:   Procedure Laterality Date     COLONOSCOPY  9/30/2013    Procedure: COMBINED COLONOSCOPY, SINGLE BIOPSY/POLYPECTOMY BY BIOPSY;  colonoscopy, polypectomy by biopsy;  Surgeon: Pedro Chris MD;  [facility]     COLONOSCOPY N/A 8/7/2017    Procedure: COLONOSCOPY;  colonoscopy;  Surgeon: Kiel Solomon MD;  [facility]     EXCISE MASS HEAD  11/18/2013    Procedure: EXCISE MASS HEAD;  Excision of left forehead mass;  Surgeon: Pelon Echavarria MD;  [facility]     LASER YAG CAPSULOTOMY Left 6/2/2016    Procedure: LASER YAG CAPSULOTOMY;  Surgeon: oJss Raza MD;  [facility]     PHACOEMULSIFICATION WITH STANDARD INTRAOCULAR LENS IMPLANT Left 11/5/2015    Procedure: PHACOEMULSIFICATION WITH STANDARD INTRAOCULAR LENS IMPLANT;  Surgeon: Joss Raza MD;  [facility]     ZZHC REPAIR UMBILICAL YVONNE,5+Y/O, INCARCERATED OR STRANGULATED  6/9/2004     Current Outpatient Medications   Medication Sig Dispense Refill     amLODIPine (NORVASC) 10 MG tablet Take 1 tablet (10 mg) by mouth daily 90 tablet 1     aspirin (ASA) 81 MG tablet Take 1 tablet by mouth daily.       cyanocobalamin (VITAMIN B-12) 1000 MCG tablet Take by mouth  "daily       hydrochlorothiazide (HYDRODIURIL) 25 MG tablet Take 1 tablet (25 mg) by mouth daily 90 tablet 1     omeprazole 20 MG tablet Take 1 tablet (20 mg) by mouth daily 90 tablet 4     Vitamin A 7.5 MG (59058 UT) CAPS        vitamin D3 (CHOLECALCIFEROL) 250 mcg (30215 units) capsule Take 1 capsule by mouth daily       NONFORMULARY          Allergies   Allergen Reactions     Lisinopril Other (See Comments)     Dry hacky cough        Social History     Tobacco Use     Smoking status: Never     Smokeless tobacco: Never   Substance Use Topics     Alcohol use: Yes     Alcohol/week: 0.0 standard drinks     Comment: 24 beers per week       History   Drug Use No         Objective     /88 (BP Location: Right arm, Patient Position: Sitting, Cuff Size: Adult Large)   Pulse 86   Temp 97.5  F (36.4  C) (Temporal)   Resp 16   Ht 1.706 m (5' 7.17\")   Wt 116.6 kg (257 lb)   SpO2 95%   BMI 40.05 kg/m      Physical Exam    GENERAL APPEARANCE: healthy, alert and no distress     EYES: EOMI,  PERRL     HENT: ear canals and TM's normal and nose and mouth without ulcers or lesions     NECK: no adenopathy, no asymmetry, masses, or scars and thyroid normal to palpation     RESP: lungs clear to auscultation - no rales, rhonchi or wheezes     CV: regular rates and rhythm, normal S1 S2, no S3 or S4 and no murmur, click or rub     ABDOMEN:  soft, nontender, no HSM or masses and bowel sounds normal     MS: extremities normal- no gross deformities noted, no evidence of inflammation in joints, FROM in all extremities.     SKIN: no suspicious lesions or rashes     NEURO: Normal strength and tone, sensory exam grossly normal, mentation intact and speech normal     PSYCH: mentation appears normal. and affect normal/bright     LYMPHATICS: No cervical adenopathy    Recent Labs   Lab Test 01/27/23  1213 09/23/21  1131 04/20/21  0925   HGB 16.5 14.3  --     224  --      --  138   POTASSIUM 4.2  --  4.2   CR 0.88  --  0.89 "   A1C 5.4  --   --         Diagnostics:  Labs pending at this time.  Results will be reviewed when available.   EKG: appears normal, NSR, left axis, normal intervals, no acute ST/T changes c/w ischemia, no LVH by voltage criteria.    Revised Cardiac Risk Index (RCRI):  The patient has the following serious cardiovascular risks for perioperative complications:   - No serious cardiac risks = 0 points     RCRI Interpretation: 0 points: Class I (very low risk - 0.4% complication rate)           Signed Electronically by: Elena Sapp MD  Copy of this evaluation report is provided to requesting physician.

## 2023-02-20 NOTE — PATIENT INSTRUCTIONS
- Hold aspirin and Multivitamin for 1 week prior to procedure  -Hold Hydrochlorothiazide -the night before surgery  -Can take Amlodipine-on the day of procedure    For informational purposes only. Not to replace the advice of your health care provider. Copyright   ,  [facility]. All rights reserved. Clinically reviewed by Kellen Bartlett MD. [facility] [id number] - REV .  Preparing for Your Surgery  Getting started  A nurse will call you to review your health history and instructions. They will give you an arrival time based on your scheduled surgery time. Please be ready to share:  Your doctor's clinic name and phone number  Your medical, surgical, and anesthesia history  A list of allergies and sensitivities  A list of medicines, including herbal treatments and over-the-counter drugs  Whether the patient has a legal guardian (ask how to send us the papers in advance)  Please tell us if you're pregnant--or if there's any chance you might be pregnant. Some surgeries may injure a fetus (unborn baby), so they require a pregnancy test. Surgeries that are safe for a fetus don't always need a test, and you can choose whether to have one.   If you have a child who's having surgery, please ask for a copy of Preparing for Your Child's Surgery.    Preparing for surgery  Within 10 to 30 days of surgery: Have a pre-op exam (sometimes called an H&P, or History and Physical). This can be done at a clinic or pre-operative center.  If you're having a , you may not need this exam. Talk to your care team.  At your pre-op exam, talk to your care team about all medicines you take. If you need to stop any medicines before surgery, ask when to start taking them again.  We do this for your safety. Many medicines can make you bleed too much during surgery. Some change how well surgery (anesthesia) drugs work.  Call your insurance company to let them know you're having surgery. (If you don't have insurance, call  [phone].)  Call your clinic if there's any change in your health. This includes signs of a cold or flu (sore throat, runny nose, cough, rash, fever). It also includes a scrape or scratch near the surgery site.  If you have questions on the day of surgery, call your hospital or surgery center.  Eating and drinking guidelines  For your safety: Unless your surgeon tells you otherwise, follow the guidelines below.  Eat and drink as usual until 8 hours before you arrive for surgery. After that, no food or milk.  Drink clear liquids until 2 hours before you arrive. These are liquids you can see through, like water, Gatorade, and Propel Water. They also include plain black coffee and tea (no cream or milk), candy, and breath mints. You can spit out gum when you arrive.  If you drink alcohol: Stop drinking it the night before surgery.  If your care team tells you to take medicine on the morning of surgery, it's okay to take it with a sip of water.  Preventing infection  Shower or bathe the night before and morning of your surgery. Follow the instructions your clinic gave you. (If no instructions, use regular soap.)  Don't shave or clip hair near your surgery site. We'll remove the hair if needed.  Don't smoke or vape the morning of surgery. You may chew nicotine gum up to 2 hours before surgery. A nicotine patch is okay.  Note: Some surgeries require you to completely quit smoking and nicotine. Check with your surgeon.  Your care team will make every effort to keep you safe from infection. We will:  Clean our hands often with soap and water (or an alcohol-based hand rub).  Clean the skin at your surgery site with a special soap that kills germs.  Give you a special gown to keep you warm. (Cold raises the risk of infection.)  Wear special hair covers, masks, gowns and gloves during surgery.  Give antibiotic medicine, if prescribed. Not all surgeries need antibiotics.  What to bring on the day of surgery  Photo ID and  insurance card  Copy of your health care directive, if you have one  Glasses and hearing aids (bring cases)  You can't wear contacts during surgery  Inhaler and eye drops, if you use them (tell us about these when you arrive)  CPAP machine or breathing device, if you use them  A few personal items, if spending the night  If you have . . .  A pacemaker, ICD (cardiac defibrillator) or other implant: Bring the ID card.  An implanted stimulator: Bring the remote control.  A legal guardian: Bring a copy of the certified (court-stamped) guardianship papers.  Please remove any jewelry, including body piercings. Leave jewelry and other valuables at home.  If you're going home the day of surgery  You must have a responsible adult drive you home. They should stay with you overnight as well.  If you don't have someone to stay with you, and you aren't safe to go home alone, we may keep you overnight. Insurance often won't pay for this.  After surgery  If it's hard to control your pain or you need more pain medicine, please call your surgeon's office.  Questions?   If you have any questions for your care team, list them here: _________________________________________________________________________________________________________________________________________________________________________ ____________________________________ ____________________________________ ____________________________________

## 2023-02-21 ENCOUNTER — OFFICE VISIT (OUTPATIENT)
Dept: ORTHOPEDICS | Facility: CLINIC | Age: 63
End: 2023-02-21
Attending: STUDENT IN AN ORGANIZED HEALTH CARE EDUCATION/TRAINING PROGRAM
Payer: COMMERCIAL

## 2023-02-21 ENCOUNTER — PRE VISIT (OUTPATIENT)
Dept: ORTHOPEDICS | Facility: CLINIC | Age: 63
End: 2023-02-21

## 2023-02-21 VITALS — WEIGHT: 264.5 LBS | BODY MASS INDEX: 37.87 KG/M2 | HEIGHT: 70 IN

## 2023-02-21 DIAGNOSIS — M17.11 PRIMARY OSTEOARTHRITIS OF RIGHT KNEE: ICD-10-CM

## 2023-02-21 PROCEDURE — 99204 OFFICE O/P NEW MOD 45 MIN: CPT | Performed by: ORTHOPAEDIC SURGERY

## 2023-02-21 ASSESSMENT — PAIN SCALES - GENERAL: PAINLEVEL: SEVERE PAIN (7)
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
## 2023-02-28 ENCOUNTER — LAB (OUTPATIENT)
Dept: LAB | Facility: CLINIC | Age: 63
End: 2023-02-28
Payer: COMMERCIAL

## 2023-02-28 DIAGNOSIS — Z01.818 PREOP GENERAL PHYSICAL EXAM: ICD-10-CM

## 2023-02-28 DIAGNOSIS — Z13.220 SCREENING FOR LIPOID DISORDERS: ICD-10-CM

## 2023-02-28 DIAGNOSIS — G62.9 PERIPHERAL POLYNEUROPATHY: ICD-10-CM

## 2023-02-28 LAB
ANION GAP SERPL CALCULATED.3IONS-SCNC: 12 MMOL/L (ref 7–15)
BUN SERPL-MCNC: 9.3 MG/DL (ref 8–23)
CALCIUM SERPL-MCNC: 9.5 MG/DL (ref 8.8–10.2)
CHLORIDE SERPL-SCNC: 98 MMOL/L (ref 98–107)
CHOLEST SERPL-MCNC: 180 MG/DL
CREAT SERPL-MCNC: 0.8 MG/DL (ref 0.67–1.17)
DEPRECATED HCO3 PLAS-SCNC: 27 MMOL/L (ref 22–29)
GFR SERPL CREATININE-BSD FRML MDRD: >90 ML/MIN/1.73M2
GLUCOSE SERPL-MCNC: 116 MG/DL (ref 70–99)
HCT VFR BLD AUTO: 46.4 % (ref 40–53)
HDLC SERPL-MCNC: 58 MG/DL
HGB BLD-MCNC: 15.5 G/DL (ref 13.3–17.7)
LDLC SERPL CALC-MCNC: 104 MG/DL
NONHDLC SERPL-MCNC: 122 MG/DL
POTASSIUM SERPL-SCNC: 3.8 MMOL/L (ref 3.4–5.3)
SODIUM SERPL-SCNC: 137 MMOL/L (ref 136–145)
TRIGL SERPL-MCNC: 89 MG/DL
VIT B12 SERPL-MCNC: 800 PG/ML (ref 232–1245)

## 2023-02-28 PROCEDURE — 85014 HEMATOCRIT: CPT

## 2023-02-28 PROCEDURE — 82607 VITAMIN B-12: CPT

## 2023-02-28 PROCEDURE — 80061 LIPID PANEL: CPT

## 2023-02-28 PROCEDURE — 80048 BASIC METABOLIC PNL TOTAL CA: CPT

## 2023-02-28 PROCEDURE — 36415 COLL VENOUS BLD VENIPUNCTURE: CPT

## 2023-02-28 PROCEDURE — 85018 HEMOGLOBIN: CPT

## 2023-02-28 NOTE — RESULT ENCOUNTER NOTE
Mr. Lopez    Your recent test results are attached.  Improved cholesterol levels compared to last test.  Blood chemistries show marginally elevated blood glucose levels.  I would recommend a follow-up on this in 3 months to rule out diabetes.  Recommend low-carb diet and regular exercise to help improve this.    If you have any questions or concerns please contact me via My Chart or call the clinic at [phone]     Thank You  Elena Sapp MD.

## 2023-02-28 NOTE — RESULT ENCOUNTER NOTE
Mr. Lopez    Your recent test results are attached.  Normal vitamin B12 levels    If you have any questions or concerns please contact me via My Chart or call the clinic at [phone]     Thank You  Elena Sapp MD.

## 2023-03-10 ENCOUNTER — ANESTHESIA EVENT (OUTPATIENT)
Dept: SURGERY | Facility: CLINIC | Age: 63
End: 2023-03-10
Payer: COMMERCIAL

## 2023-03-10 ENCOUNTER — HOSPITAL ENCOUNTER (OUTPATIENT)
Facility: CLINIC | Age: 63
Discharge: HOME | End: 2023-03-10
Attending: SURGERY | Admitting: SURGERY
Payer: COMMERCIAL

## 2023-03-10 ENCOUNTER — ANESTHESIA (OUTPATIENT)
Dept: SURGERY | Facility: CLINIC | Age: 63
End: 2023-03-10
Payer: COMMERCIAL

## 2023-03-10 VITALS
TEMPERATURE: 98.6 F | RESPIRATION RATE: 14 BRPM | OXYGEN SATURATION: 92 % | SYSTOLIC BLOOD PRESSURE: 166 MMHG | HEART RATE: 72 BPM | DIASTOLIC BLOOD PRESSURE: 93 MMHG

## 2023-03-10 DIAGNOSIS — Z98.890 S/P ROBOT-ASSISTED SURGICAL PROCEDURE: Primary | ICD-10-CM

## 2023-03-10 PROCEDURE — 250N000009 HC RX 250: Performed by: NURSE ANESTHETIST, CERTIFIED REGISTERED

## 2023-03-10 PROCEDURE — 370N000017 HC ANESTHESIA TECHNICAL FEE, PER MIN: Performed by: SURGERY

## 2023-03-10 PROCEDURE — 250N000026 HC DESFLURANE, PER MIN: Performed by: SURGERY

## 2023-03-10 PROCEDURE — 250N000011 HC RX IP 250 OP 636: Performed by: SURGERY

## 2023-03-10 PROCEDURE — 250N000011 HC RX IP 250 OP 636: Performed by: NURSE ANESTHETIST, CERTIFIED REGISTERED

## 2023-03-10 PROCEDURE — 710N000010 HC RECOVERY PHASE 1, LEVEL 2, PER MIN: Performed by: SURGERY

## 2023-03-10 PROCEDURE — 272N000001 HC OR GENERAL SUPPLY STERILE: Performed by: SURGERY

## 2023-03-10 PROCEDURE — 710N000012 HC RECOVERY PHASE 2, PER MINUTE: Performed by: SURGERY

## 2023-03-10 PROCEDURE — C1781 MESH (IMPLANTABLE): HCPCS | Performed by: SURGERY

## 2023-03-10 PROCEDURE — 250N000013 HC RX MED GY IP 250 OP 250 PS 637: Performed by: SURGERY

## 2023-03-10 PROCEDURE — 49594 RPR AA HRN 1ST 3-10 NCR/STRN: CPT | Performed by: SURGERY

## 2023-03-10 PROCEDURE — S2900 ROBOTIC SURGICAL SYSTEM: HCPCS | Performed by: SURGERY

## 2023-03-10 PROCEDURE — 258N000003 HC RX IP 258 OP 636: Performed by: NURSE ANESTHETIST, CERTIFIED REGISTERED

## 2023-03-10 PROCEDURE — 999N000141 HC STATISTIC PRE-PROCEDURE NURSING ASSESSMENT: Performed by: SURGERY

## 2023-03-10 PROCEDURE — 360N000080 HC SURGERY LEVEL 7, PER MIN: Performed by: SURGERY

## 2023-03-10 PROCEDURE — 250N000009 HC RX 250: Performed by: SURGERY

## 2023-03-10 DEVICE — MESH VENTRALIGHT ST W/ECHO POS SYSTEM 4.5" CIRCLE 5955450: Type: IMPLANTABLE DEVICE | Site: ABDOMEN | Status: FUNCTIONAL

## 2023-03-10 RX ORDER — ONDANSETRON 4 MG/1
4 TABLET, ORALLY DISINTEGRATING ORAL EVERY 30 MIN PRN
Status: DISCONTINUED | OUTPATIENT
Start: 2023-03-10 | End: 2023-03-10 | Stop reason: HOSPADM

## 2023-03-10 RX ORDER — FENTANYL CITRATE 50 UG/ML
INJECTION, SOLUTION INTRAMUSCULAR; INTRAVENOUS PRN
Status: DISCONTINUED | OUTPATIENT
Start: 2023-03-10 | End: 2023-03-10

## 2023-03-10 RX ORDER — CEFAZOLIN SODIUM/WATER 2 G/20 ML
2 SYRINGE (ML) INTRAVENOUS
Status: COMPLETED | OUTPATIENT
Start: 2023-03-10 | End: 2023-03-10

## 2023-03-10 RX ORDER — ONDANSETRON 2 MG/ML
4 INJECTION INTRAMUSCULAR; INTRAVENOUS
Status: COMPLETED | OUTPATIENT
Start: 2023-03-10 | End: 2023-03-10

## 2023-03-10 RX ORDER — LIDOCAINE 40 MG/G
CREAM TOPICAL
Status: DISCONTINUED | OUTPATIENT
Start: 2023-03-10 | End: 2023-03-10 | Stop reason: HOSPADM

## 2023-03-10 RX ORDER — SODIUM CHLORIDE, SODIUM LACTATE, POTASSIUM CHLORIDE, CALCIUM CHLORIDE 600; 310; 30; 20 MG/100ML; MG/100ML; MG/100ML; MG/100ML
INJECTION, SOLUTION INTRAVENOUS CONTINUOUS
Status: DISCONTINUED | OUTPATIENT
Start: 2023-03-10 | End: 2023-03-10 | Stop reason: HOSPADM

## 2023-03-10 RX ORDER — FENTANYL CITRATE 50 UG/ML
50 INJECTION, SOLUTION INTRAMUSCULAR; INTRAVENOUS EVERY 5 MIN PRN
Status: DISCONTINUED | OUTPATIENT
Start: 2023-03-10 | End: 2023-03-10 | Stop reason: HOSPADM

## 2023-03-10 RX ORDER — HYDROMORPHONE HYDROCHLORIDE 1 MG/ML
0.4 INJECTION, SOLUTION INTRAMUSCULAR; INTRAVENOUS; SUBCUTANEOUS EVERY 5 MIN PRN
Status: DISCONTINUED | OUTPATIENT
Start: 2023-03-10 | End: 2023-03-10 | Stop reason: HOSPADM

## 2023-03-10 RX ORDER — BUPIVACAINE HYDROCHLORIDE AND EPINEPHRINE 2.5; 5 MG/ML; UG/ML
INJECTION, SOLUTION EPIDURAL; INFILTRATION; INTRACAUDAL; PERINEURAL PRN
Status: DISCONTINUED | OUTPATIENT
Start: 2023-03-10 | End: 2023-03-10 | Stop reason: HOSPADM

## 2023-03-10 RX ORDER — ONDANSETRON 2 MG/ML
4 INJECTION INTRAMUSCULAR; INTRAVENOUS EVERY 30 MIN PRN
Status: DISCONTINUED | OUTPATIENT
Start: 2023-03-10 | End: 2023-03-10 | Stop reason: HOSPADM

## 2023-03-10 RX ORDER — FENTANYL CITRATE 50 UG/ML
25 INJECTION, SOLUTION INTRAMUSCULAR; INTRAVENOUS EVERY 5 MIN PRN
Status: DISCONTINUED | OUTPATIENT
Start: 2023-03-10 | End: 2023-03-10 | Stop reason: HOSPADM

## 2023-03-10 RX ORDER — OXYCODONE AND ACETAMINOPHEN 5; 325 MG/1; MG/1
1-2 TABLET ORAL EVERY 4 HOURS PRN
Qty: 12 TABLET | Refills: 0 | Status: SHIPPED | OUTPATIENT
Start: 2023-03-10 | End: 2023-09-25

## 2023-03-10 RX ORDER — DEXAMETHASONE SODIUM PHOSPHATE 4 MG/ML
INJECTION, SOLUTION INTRA-ARTICULAR; INTRALESIONAL; INTRAMUSCULAR; INTRAVENOUS; SOFT TISSUE PRN
Status: DISCONTINUED | OUTPATIENT
Start: 2023-03-10 | End: 2023-03-10

## 2023-03-10 RX ORDER — OXYCODONE AND ACETAMINOPHEN 5; 325 MG/1; MG/1
1 TABLET ORAL
Status: COMPLETED | OUTPATIENT
Start: 2023-03-10 | End: 2023-03-10

## 2023-03-10 RX ORDER — HYDROMORPHONE HYDROCHLORIDE 1 MG/ML
0.2 INJECTION, SOLUTION INTRAMUSCULAR; INTRAVENOUS; SUBCUTANEOUS EVERY 5 MIN PRN
Status: DISCONTINUED | OUTPATIENT
Start: 2023-03-10 | End: 2023-03-10 | Stop reason: HOSPADM

## 2023-03-10 RX ORDER — CEFAZOLIN SODIUM/WATER 2 G/20 ML
2 SYRINGE (ML) INTRAVENOUS SEE ADMIN INSTRUCTIONS
Status: DISCONTINUED | OUTPATIENT
Start: 2023-03-10 | End: 2023-03-10 | Stop reason: HOSPADM

## 2023-03-10 RX ORDER — KETAMINE HYDROCHLORIDE 10 MG/ML
INJECTION INTRAMUSCULAR; INTRAVENOUS PRN
Status: DISCONTINUED | OUTPATIENT
Start: 2023-03-10 | End: 2023-03-10

## 2023-03-10 RX ORDER — PROPOFOL 10 MG/ML
INJECTION, EMULSION INTRAVENOUS PRN
Status: DISCONTINUED | OUTPATIENT
Start: 2023-03-10 | End: 2023-03-10

## 2023-03-10 RX ORDER — KETOROLAC TROMETHAMINE 30 MG/ML
INJECTION, SOLUTION INTRAMUSCULAR; INTRAVENOUS PRN
Status: DISCONTINUED | OUTPATIENT
Start: 2023-03-10 | End: 2023-03-10

## 2023-03-10 RX ORDER — LIDOCAINE HYDROCHLORIDE 20 MG/ML
INJECTION, SOLUTION INFILTRATION; PERINEURAL PRN
Status: DISCONTINUED | OUTPATIENT
Start: 2023-03-10 | End: 2023-03-10

## 2023-03-10 RX ADMIN — FENTANYL CITRATE 50 MCG: 50 INJECTION, SOLUTION INTRAMUSCULAR; INTRAVENOUS at 15:03

## 2023-03-10 RX ADMIN — Medication 10 MG: at 14:01

## 2023-03-10 RX ADMIN — HYDROMORPHONE HYDROCHLORIDE 0.4 MG: 1 INJECTION, SOLUTION INTRAMUSCULAR; INTRAVENOUS; SUBCUTANEOUS at 15:43

## 2023-03-10 RX ADMIN — FENTANYL CITRATE 50 MCG: 50 INJECTION, SOLUTION INTRAMUSCULAR; INTRAVENOUS at 15:14

## 2023-03-10 RX ADMIN — DEXAMETHASONE SODIUM PHOSPHATE 8 MG: 4 INJECTION, SOLUTION INTRA-ARTICULAR; INTRALESIONAL; INTRAMUSCULAR; INTRAVENOUS; SOFT TISSUE at 13:20

## 2023-03-10 RX ADMIN — LIDOCAINE HYDROCHLORIDE 50 MG: 20 INJECTION, SOLUTION INFILTRATION; PERINEURAL at 13:10

## 2023-03-10 RX ADMIN — PROPOFOL 200 MG: 10 INJECTION, EMULSION INTRAVENOUS at 13:10

## 2023-03-10 RX ADMIN — FENTANYL CITRATE 50 MCG: 50 INJECTION, SOLUTION INTRAMUSCULAR; INTRAVENOUS at 13:03

## 2023-03-10 RX ADMIN — OXYCODONE HYDROCHLORIDE AND ACETAMINOPHEN 1 TABLET: 5; 325 TABLET ORAL at 16:24

## 2023-03-10 RX ADMIN — FENTANYL CITRATE 50 MCG: 50 INJECTION, SOLUTION INTRAMUSCULAR; INTRAVENOUS at 15:19

## 2023-03-10 RX ADMIN — ROCURONIUM BROMIDE 50 MG: 50 INJECTION, SOLUTION INTRAVENOUS at 13:11

## 2023-03-10 RX ADMIN — ROCURONIUM BROMIDE 20 MG: 50 INJECTION, SOLUTION INTRAVENOUS at 13:45

## 2023-03-10 RX ADMIN — SUGAMMADEX 200 MG: 100 INJECTION, SOLUTION INTRAVENOUS at 14:34

## 2023-03-10 RX ADMIN — SODIUM CHLORIDE, POTASSIUM CHLORIDE, SODIUM LACTATE AND CALCIUM CHLORIDE: 600; 310; 30; 20 INJECTION, SOLUTION INTRAVENOUS at 12:12

## 2023-03-10 RX ADMIN — FENTANYL CITRATE 50 MCG: 50 INJECTION, SOLUTION INTRAMUSCULAR; INTRAVENOUS at 14:38

## 2023-03-10 RX ADMIN — HYDROMORPHONE HYDROCHLORIDE 0.4 MG: 1 INJECTION, SOLUTION INTRAMUSCULAR; INTRAVENOUS; SUBCUTANEOUS at 15:24

## 2023-03-10 RX ADMIN — ONDANSETRON 4 MG: 2 INJECTION INTRAMUSCULAR; INTRAVENOUS at 14:34

## 2023-03-10 RX ADMIN — KETOROLAC TROMETHAMINE 30 MG: 30 INJECTION, SOLUTION INTRAMUSCULAR at 14:36

## 2023-03-10 RX ADMIN — Medication 30 MG: at 13:26

## 2023-03-10 RX ADMIN — Medication 2 G: at 13:01

## 2023-03-10 RX ADMIN — MIDAZOLAM 1 MG: 1 INJECTION INTRAMUSCULAR; INTRAVENOUS at 13:03

## 2023-03-10 RX ADMIN — FENTANYL CITRATE 50 MCG: 50 INJECTION, SOLUTION INTRAMUSCULAR; INTRAVENOUS at 14:58

## 2023-03-10 ASSESSMENT — ACTIVITIES OF DAILY LIVING (ADL)
ADLS_ACUITY_SCORE: 35

## 2023-03-10 NOTE — INTERVAL H&P NOTE
"I have reviewed the surgical (or preoperative) H&P that is linked to this encounter, and examined the patient. There are no significant changes    Clinical Conditions Present on Arrival:  Clinically Significant Risk Factors Present on Admission                    # Obesity: Estimated body mass index is 38.5 kg/m  as calculated from the following:    Height as of 2/21/23: 1.765 m (5' 9.5\").    Weight as of 2/21/23: 120 kg (264 lb 8 oz).       "

## 2023-03-10 NOTE — ANESTHESIA PREPROCEDURE EVALUATION
Anesthesia Pre-Procedure Evaluation    Patient: Jeevan Lopez   MRN: [id number] : 1960        Procedure : Procedure(s):  Robot-assisted laparoscopic ventral hernia repair with mesh          Past Medical History:   Diagnosis Date     Adenomatous polyp of colon 2013    q 5 yr colonoscopy     Bell's palsy 2007     Deviated septum 2013    See CT scan     Hypertension      LVH (left ventricular hypertrophy) 2011    see stress echo     NI (obstructive sleep apnea)     moderate - dental appliance     Sensory polyneuropathy     feet - see neuro consult     VENTRAL HERNIA NEC 2007      Past Surgical History:   Procedure Laterality Date     COLONOSCOPY  2013    Procedure: COMBINED COLONOSCOPY, SINGLE BIOPSY/POLYPECTOMY BY BIOPSY;  colonoscopy, polypectomy by biopsy;  Surgeon: Pedro Chris MD;  [facility]     COLONOSCOPY N/A 2017    Procedure: COLONOSCOPY;  colonoscopy;  Surgeon: Kiel Solomon MD;  [facility]     EXCISE MASS HEAD  2013    Procedure: EXCISE MASS HEAD;  Excision of left forehead mass;  Surgeon: Pelon Echavarria MD;  [facility]     LASER YAG CAPSULOTOMY Left 2016    Procedure: LASER YAG CAPSULOTOMY;  Surgeon: Joss Raza MD;  [facility]     PHACOEMULSIFICATION WITH STANDARD INTRAOCULAR LENS IMPLANT Left 2015    Procedure: PHACOEMULSIFICATION WITH STANDARD INTRAOCULAR LENS IMPLANT;  Surgeon: Joss Raza MD;  [facility]     ZZHC REPAIR UMBILICAL YVONNE,5+Y/O, INCARCERATED OR STRANGULATED  2004      Allergies   Allergen Reactions     Lisinopril Other (See Comments)     Dry hacky cough      Social History     Tobacco Use     Smoking status: Never     Smokeless tobacco: Never   Substance Use Topics     Alcohol use: Yes     Alcohol/week: 0.0 standard drinks     Comment: 24 beers per week      Wt Readings from Last 1 Encounters:   23 120 kg (264 lb 8 oz)        Anesthesia Evaluation   Pt  has had prior anesthetic. Type: General and MAC.        ROS/MED HX  ENT/Pulmonary:     (+) sleep apnea, mild, uses CPAP,     Neurologic:  - neg neurologic ROS     Cardiovascular:     (+) hypertension-----Previous cardiac testing   Echo: Date: Results:    Stress Test: Date: 11/29/2017 Results:  Interpretation Summary  Normal resting heart rate and blood pressure with normal response to exercise  Normal exercise capacity for age  Normal resting ECG without ischemic changes postexercise. Bigeminal PVCs seen  in the recovery phase  Normal resting LV function with good augmentation of all wall segments  postexercise. No wall motion abnormalities are seen.  This test indicates low probability of significant exercise induced myocardial  ischemia.  ECG Reviewed: Date: 02/20/23 Results:  - NSR   Cath: Date: Results:      METS/Exercise Tolerance:     Hematologic:  - neg hematologic  ROS     Musculoskeletal:  - neg musculoskeletal ROS     GI/Hepatic:     (+) GERD, Asymptomatic on medication,     Renal/Genitourinary:       Endo:     (+) Obesity,     Psychiatric/Substance Use:  - neg psychiatric ROS     Infectious Disease:       Malignancy:  - neg malignancy ROS     Other:            Physical Exam    Airway        Mallampati: II   TM distance: > 3 FB   Neck ROM: full   Mouth opening: > 3 cm    Respiratory Devices and Support         Dental       (+) Minor Abnormalities - some fillings, tiny chips      Cardiovascular   cardiovascular exam normal          Pulmonary   pulmonary exam normal                OUTSIDE LABS:  CBC:   Lab Results   Component Value Date    WBC 10.5 01/27/2023    WBC 7.2 09/23/2021    HGB 15.5 02/28/2023    HGB 16.5 01/27/2023    HCT 46.4 02/28/2023    HCT 50.1 01/27/2023     01/27/2023     09/23/2021     BMP:   Lab Results   Component Value Date     02/28/2023     01/27/2023    POTASSIUM 3.8 02/28/2023    POTASSIUM 4.2 01/27/2023    CHLORIDE 98 02/28/2023    CHLORIDE 98 01/27/2023     CO2 27 02/28/2023    CO2 28 01/27/2023    BUN 9.3 02/28/2023    BUN 10.9 01/27/2023    CR 0.80 02/28/2023    CR 0.88 01/27/2023     (H) 02/28/2023     (H) 01/27/2023     COAGS: No results found for: PTT, INR, FIBR  POC: No results found for: BGM, HCG, HCGS  HEPATIC:   Lab Results   Component Value Date    ALBUMIN 4.4 01/27/2023    PROTTOTAL 7.2 01/27/2023    ALT 11 01/27/2023    AST 22 01/27/2023    ALKPHOS 83 01/27/2023    BILITOTAL 0.6 01/27/2023     OTHER:   Lab Results   Component Value Date    A1C 5.4 01/27/2023    HAYLEY 9.5 02/28/2023    TSH 1.43 01/27/2023    CRP 12.2 11/28/2017    SED 5 09/17/2014       Anesthesia Plan    ASA Status:  3   NPO Status:  NPO Appropriate    Anesthesia Type: General.     - Airway: ETT   Induction: Propofol, Intravenous.   Maintenance: Balanced.        Consents    Anesthesia Plan(s) and associated risks, benefits, and realistic alternatives discussed. Questions answered and patient/representative(s) expressed understanding.     - Discussed: Risks, Benefits and Alternatives for BOTH SEDATION and the PROCEDURE were discussed     - Discussed with:  Patient      - Extended Intubation/Ventilatory Support Discussed: No.      - Patient is DNR/DNI Status: No    Use of blood products discussed: Yes.     - Discussed with: Patient.     Postoperative Care    Pain management: IV analgesics.   PONV prophylaxis: Dexamethasone or Solumedrol, Ondansetron (or other 5HT-3), Background Propofol Infusion     Comments:    Other Comments: The risks and benefits of anesthesia, and the alternatives where applicable, have been discussed with the patient, and they wish to proceed.            PRASAD Negrete CRNA

## 2023-03-10 NOTE — OP NOTE
Procedure Date: 3/10/2023     PROCEDURE:  Robot-assisted laparoscopic incarcerated ventral hernia repair with mesh                                           PREOPERATIVE DIAGNOSIS: ventral hernia.     POSTOPERATIVE DIAGNOSIS:  incarcerated ventral hernia    SURGEON:  Clif Lua DO     ASSISTANT:  None     ANESTHESIA:  General endotracheal anesthesia.     SPECIMENS:  none     ESTIMATED BLOOD LOSS:  5 mL.      COMPLICATIONS:  None immediately apparent.     OPERATIVE FINDINGS:  A 3 cm ventral hernia defect with falciform fat incarceration, umbilicus looked intact     INDICATIONS FOR PROCEDURE: Jeevan is a 62 year old male whom I met in the surgical clinic with complaint of painful supra-umbilical bulge.  Physical exam corroborated the history and robot-assisted laparoscopic ventral hernia repair with mesh was recommended.  We discussed the procedure in detail and after the discussion, agreed to proceed with surgery.     DESCRIPTION OF PROCEDURE:  After the informed consent was obtained, the patient was brought from the preoperative holding area to the operating room and placed in the supine position.  Anesthesia was induced. They were prepped and draped in the normal sterile fashion.  Timeout was performed.  After correct patient and correct procedure were verified, we began by making an 8 mm incision in the left upper quadrant just underneath the left subcostal margin.  A Veress needle was inserted into the peritoneal cavity and the abdomen was insufflated to 15 mmHg.  Trocar was inserted.  Camera was inserted.  General survey of the abdomen revealed an epigastric hernia with incarcerated falciform.  A robotic camera trocar was placed in the left mid abdomen and an additional robot trocar placed in left lower quadrant both under direct visualization.  The patient was placed in slight right side down position.  We chose a 4.5 inch Echo Ventralight ST mesh for the repair.  This was placed in the peritoneal cavity  through the left lower quadrant incision.  Two 2-0 Stratafix sutures and an 0 Stratafix suture were placed in the peritoneal cavity as well.  The robot was docked.  We used a Cadiere grasper in the left arm and a monopolar scissors in the right arm.  We began by incising the peritoneum inferiorly several centimeters below the hernia defect.  The peritoneum and preperitoneal fat were dissected away from the posterior sheath all the way up to and through the hernia.  Hernia sac was reduced in the peritoneal cavity with the preperitoneal fat hernia contents. Once an appropriate space had been cleared for the mesh, we used a needle  to close the hernia defect using the 0 Stratafix suture in running fashion.  Then, a small nick incision was made externally and a PMI grasper was used to grasp the catheter at the center of the mesh and pulled through the abdominal wall.  The scaffolding was insufflated and clamped.  This mesh was secured in place using 2-0 Stratafix suture in a running fashion circumferentially.  The catheter was then cut externally and the scaffolding was removed from the mesh.  The mesh appeared to be in excellent position.  The scaffolding was removed from the peritoneal cavity as well as all 3 needles and sutures intact without issue.  Another survey of the abdomen revealed no operative complications and no bleeding.  The robot was then undocked.  The abdomen was then desufflated while the ports were removed under direct visualization.  The skin was instilled with 0.25% Marcaine with epinephrine for local anesthesia.  Skin was closed with inverted interrupted 4-0 Monocryl sutures and Exofin dressing was applied.  At the completion of the case all instruments, needles, and sponges were accounted for after a correct count.  The patient was then awoken from anesthesia and brought to recovery room in stable condition.     Clif Lua DO

## 2023-03-10 NOTE — ANESTHESIA POSTPROCEDURE EVALUATION
Patient: Jeevan Lopez    Procedure: Procedure(s):  Robot-assisted laparoscopic ventral hernia repair with mesh       Anesthesia Type:  General    Note:  Disposition: Outpatient   Postop Pain Control: Uneventful            Sign Out: Well controlled pain   PONV: No   Neuro/Psych: Uneventful            Sign Out: Acceptable/Baseline neuro status   Airway/Respiratory: Uneventful            Sign Out: Acceptable/Baseline resp. status   CV/Hemodynamics: Uneventful            Sign Out: Acceptable CV status   Other NRE: NONE   DID A NON-ROUTINE EVENT OCCUR? No    Event details/Postop Comments:   No complications.  I will follow up with the pt if needed.           Last vitals:  Vitals Value Taken Time   /93 03/10/23 1545   Temp 98.06  F (36.7  C) 03/10/23 1556   Pulse 79 03/10/23 1556   Resp 17 03/10/23 1556   SpO2 96 % 03/10/23 1556   Vitals shown include unvalidated device data.    Electronically Signed By: PRASAD Negrete CRNA  March 10, 2023  5:06 PM

## 2023-03-10 NOTE — ANESTHESIA PROCEDURE NOTES
Airway       Patient location during procedure: OR       Procedure Start/Stop Times: 3/10/2023 1:13 PM  Staff -        CRNA: Nakul Paz APRN CRNA       Performed By: CRNA  Consent for Airway        Urgency: elective  Indications and Patient Condition       Indications for airway management: christoph-procedural       Induction type:intravenous       Mask difficulty assessment: 2 - vent by mask + OA or adjuvant +/- NMBA    Final Airway Details       Final airway type: endotracheal airway       Successful airway: ETT - single  Endotracheal Airway Details        ETT size (mm): 7.5       Cuffed: yes       Successful intubation technique: direct laryngoscopy       DL Blade Type: MAC 3       Grade View of Cords: 2       Adjucts: stylet       Position: Right       Measured from: lips       Secured at (cm): 23       Bite block used: Oral Airway    Post intubation assessment        Placement verified by: capnometry, equal breath sounds and chest rise        Number of attempts at approach: 1       Number of other approaches attempted: 0       Secured with: plastic tape       Ease of procedure: easy       Dentition: Intact and Unchanged    Medication(s) Administered   Medication Administration Time: 3/10/2023 1:13 PM

## 2023-03-10 NOTE — BRIEF OP NOTE
[facility] Brief Operative Note    Pre-operative diagnosis: Ventral hernia without obstruction or gangrene [K43.9]   Post-operative diagnosis incarcerated ventral hernia   Procedure: Procedure(s):  Robot-assisted laparoscopic ventral hernia repair with mesh   Surgeon(s): Surgeon(s) and Role:     * Clif Lua, DO - Primary   Estimated blood loss: 5ml    Specimens: * No specimens in log *   Findings: 3cm ventral hernia with incarcerated falciform

## 2023-03-10 NOTE — ANESTHESIA CARE TRANSFER NOTE
Patient: Jeevan Lopez    Procedure: Procedure(s):  Robot-assisted laparoscopic ventral hernia repair with mesh       Diagnosis: Ventral hernia without obstruction or gangrene [K43.9]  Diagnosis Additional Information: No value filed.    Anesthesia Type:   General     Note:    Oropharynx: oropharynx clear of all foreign objects and spontaneously breathing  Level of Consciousness: drowsy  Oxygen Supplementation: face mask  Level of Supplemental Oxygen (L/min / FiO2): 6  Independent Airway: airway patency satisfactory and stable  Dentition: dentition unchanged  Vital Signs Stable: post-procedure vital signs reviewed and stable  Report to RN Given: handoff report given  Patient transferred to: PACU    Handoff Report: Identifed the Patient, Identified the Reponsible Provider, Reviewed the pertinent medical history, Discussed the surgical course, Reviewed Intra-OP anesthesia mangement and issues during anesthesia, Set expectations for post-procedure period and Allowed opportunity for questions and acknowledgement of understanding      Vitals:  Vitals Value Taken Time   BP     Temp     Pulse     Resp     SpO2         Electronically Signed By: PRASAD Negrete CRNA  March 10, 2023  2:49 PM

## 2023-03-10 NOTE — DISCHARGE INSTRUCTIONS
[facility]    Home Care Following Hernia Repair    Dr. Lua    Hernia Type:  Ventral    Care of the Incision:  Surgical glue was used, keep your incision dry for 24 hours.  Then you may shower, but don t submerge under water for at least 2 weeks.  Gently pat your incision dry with a freshly laundered towel.  Do not touch your incision with bare hands or pick at scabs.  Leave your incision open to air.  Cover it only if clothing rubs or irritates it.  Activity:  Gradually increase your activity.  Walk short distances several times each day and increase the distance as your strength allows.  To promote circulation, do not cross your legs while sitting.  No strenuous lifting or straining for 4 weeks.   Do not lift anything over 15 pounds for 4 weeks.  Return to work will be determined by the type of work you do and should be discussed with your physician.  Do not drive or operate equipment while taking prescription pain medicines.  You may drive 1 week after surgery if you have stopped taking prescription pain medicines and are pain-free enough to react quickly and make an emergency stop if necessary.    Diet:  Return to the diet you were on before surgery.  Drink plenty of  water.  Avoid foods that cause constipation.    REMEMBER--most prescription pain pills cause constipation.  Walking, extra fluids, and increased fiber (fresh fruits and vegetables, etc.) are natural remedies for constipation.  You can also take mineral oil, 1-2 Tablespoons per day.  If still constipated you may try a stool softener such as Colace or Miralax.    Call Your Physician if You Have:  Redness, increased swelling or cloudy drainage from your incision.  A temperature of more than 101 degrees F.  Worsening pain in your incision not relieved by your prescription pain pills and/or a short rest.  Any questions or concerns about your recovery, please call Business hours [phone]    After hours [phone] Nurse  Advice Line (24 hours a day)    [facility]   Adult Discharge Orders & Instructions Following Anesthesia    For 24 hours after surgery    Get plenty of rest.  A responsible adult must stay with you for at least 24 hours after you leave the hospital.   Do not drive or use heavy equipment.  If you have weakness or tingling, don't drive or use heavy equipment until this feeling goes away.  Do not drink alcohol.  Avoid strenuous or risky activities.  Ask for help when climbing stairs.   You may feel lightheaded.  IF so, sit for a few minutes before standing.  Have someone help you get up.   If you have nausea (feel sick to your stomach): Drink only clear liquids such as apple juice, ginger ale, broth or 7-Up.  Rest may also help.  Be sure to drink enough fluids.  Move to a regular diet as you feel able.  You may have a slight fever. Call the doctor if your fever is over 100 F (37.7 C) (taken under the tongue) or lasts longer than 24 hours.  You may have a dry mouth, a sore throat, muscle aches or trouble sleeping.  These should go away after 24 hours.  Do not make important or legal decisions.

## 2023-03-22 ENCOUNTER — OFFICE VISIT (OUTPATIENT)
Dept: SURGERY | Facility: CLINIC | Age: 63
End: 2023-03-22
Payer: COMMERCIAL

## 2023-03-22 VITALS
WEIGHT: 264 LBS | SYSTOLIC BLOOD PRESSURE: 148 MMHG | HEIGHT: 69 IN | DIASTOLIC BLOOD PRESSURE: 90 MMHG | BODY MASS INDEX: 39.1 KG/M2

## 2023-03-22 DIAGNOSIS — Z98.890 S/P ROBOT-ASSISTED SURGICAL PROCEDURE: Primary | ICD-10-CM

## 2023-03-22 PROCEDURE — 99213 OFFICE O/P EST LOW 20 MIN: CPT | Performed by: SURGERY

## 2023-03-22 NOTE — PROGRESS NOTES
General Surgery Follow Up    Pt returns for follow up visit s/p robot-assisted ventral hernia repair with mesh    HPI:  Pretty well.  Initially had some constipation but this is resolved.  Denies any significant discomfort.  Incisions healing well.  Mild bump at the previous hernia site consistent with seroma or hematoma not causing any problems.      Past Medical History:   Diagnosis Date     Adenomatous polyp of colon 9/2013    q 5 yr colonoscopy     Bell's palsy 1/17/2007     Deviated septum 9/17/2013    See CT scan     Hypertension      LVH (left ventricular hypertrophy) 6/7/2011    see stress echo     NI (obstructive sleep apnea) 2016    moderate - dental appliance     Sensory polyneuropathy 2014    feet - see neuro consult     VENTRAL HERNIA NEC 6/19/2007       Past Surgical History:   Procedure Laterality Date     COLONOSCOPY  9/30/2013    Procedure: COMBINED COLONOSCOPY, SINGLE BIOPSY/POLYPECTOMY BY BIOPSY;  colonoscopy, polypectomy by biopsy;  Surgeon: Pedro Chris MD;  [facility]     COLONOSCOPY N/A 8/7/2017    Procedure: COLONOSCOPY;  colonoscopy;  Surgeon: Kiel Solomon MD;  Location:  GI     DAVINCI XI HERNIORRHAPHY VENTRAL N/A 3/10/2023    Procedure: Robot-assisted laparoscopic ventral hernia repair with mesh;  Surgeon: Clif Lua DO;  [facility]     EXCISE MASS HEAD  11/18/2013    Procedure: EXCISE MASS HEAD;  Excision of left forehead mass;  Surgeon: Pelon Echavarria MD;  [facility]     LASER YAG CAPSULOTOMY Left 6/2/2016    Procedure: LASER YAG CAPSULOTOMY;  Surgeon: Joss Raza MD;  [facility]     PHACOEMULSIFICATION WITH STANDARD INTRAOCULAR LENS IMPLANT Left 11/5/2015    Procedure: PHACOEMULSIFICATION WITH STANDARD INTRAOCULAR LENS IMPLANT;  Surgeon: Joss Raza MD;  [facility]     ZZHC REPAIR UMBILICAL YVONNE,5+Y/O, INCARCERATED OR STRANGULATED  6/9/2004       Social History     Socioeconomic History     Marital status:  "     Spouse name: Not on file     Number of children: Not on file     Years of education: Not on file     Highest education level: Not on file   Occupational History     Not on file   Tobacco Use     Smoking status: Never     Smokeless tobacco: Never   Vaping Use     Vaping Use: Never used   Substance and Sexual Activity     Alcohol use: Yes     Alcohol/week: 0.0 standard drinks     Comment: 24 beers per week     Drug use: No     Sexual activity: Yes     Partners: Female     Comment: single, 1 child, work - self employed   Other Topics Concern     Parent/sibling w/ CABG, MI or angioplasty before 65F 55M? Not Asked   Social History Narrative     Not on file     Social Determinants of Health     Financial Resource Strain: Not on file   Food Insecurity: Not on file   Transportation Needs: Not on file   Physical Activity: Not on file   Stress: Not on file   Social Connections: Not on file   Intimate Partner Violence: Not on file   Housing Stability: Not on file       Current Outpatient Medications   Medication Sig Dispense Refill     amLODIPine (NORVASC) 10 MG tablet Take 1 tablet (10 mg) by mouth daily 90 tablet 1     aspirin (ASA) 81 MG tablet Take 1 tablet by mouth daily.       cyanocobalamin (VITAMIN B-12) 1000 MCG tablet Take by mouth daily       hydrochlorothiazide (HYDRODIURIL) 25 MG tablet Take 1 tablet (25 mg) by mouth daily 90 tablet 1     NONFORMULARY        omeprazole 20 MG tablet Take 1 tablet (20 mg) by mouth daily 90 tablet 4     Vitamin A 7.5 MG (58484 UT) CAPS        vitamin D3 (CHOLECALCIFEROL) 250 mcg (18245 units) capsule Take 1 capsule by mouth daily       oxyCODONE-acetaminophen (PERCOCET) 5-325 MG tablet Take 1-2 tablets by mouth every 4 hours as needed for pain (Patient not taking: Reported on 3/22/2023) 12 tablet 0       Medications and history reviewed    Physical exam:  Vitals: BP (!) 148/90   Ht 1.753 m (5' 9\")   Wt 119.7 kg (264 lb)   BMI 38.99 kg/m    BMI= Body mass index is " 38.99 kg/m .    HEART: RRR, no new murmurs  LUNGS: CTAB, equal chest rise, good effort  ABD: soft, non tender, non distended  INCISIONS: Clean dry and intact.  Ventral abdomen with small lump, irreducible.  Consistent with seroma or hematoma  EXT: OCONNOR, no deformities    PATHOLOGY:  None    Assessment:     ICD-10-CM    1. S/P robot-assisted surgical procedure  Z98.890         Plan: Doing pretty well.  Restrictions reviewed and questions answered.  We discussed postoperative seroma and how this might take several weeks to even months to resolve.  If there are no symptoms or signs of infection it is best to leave it alone.  He understands.    20 minutes spent on the date of the encounter doing chart review, history and exam, documentation and further activities per the note    DO Jeevan Fish to follow up with Primary Care provider regarding elevated blood pressure.

## 2023-03-22 NOTE — LETTER
3/22/2023         RE: Jeevan Lopez  [address] [facility] [address]        Dear Colleague,    Thank you for referring your patient, Jeevan Lopez, to the [facility]. Please see a copy of my visit note below.    General Surgery Follow Up    Pt returns for follow up visit s/p robot-assisted ventral hernia repair with mesh    HPI:  Pretty well.  Initially had some constipation but this is resolved.  Denies any significant discomfort.  Incisions healing well.  Mild bump at the previous hernia site consistent with seroma or hematoma not causing any problems.      Past Medical History:   Diagnosis Date     Adenomatous polyp of colon 9/2013    q 5 yr colonoscopy     Bell's palsy 1/17/2007     Deviated septum 9/17/2013    See CT scan     Hypertension      LVH (left ventricular hypertrophy) 6/7/2011    see stress echo     NI (obstructive sleep apnea) 2016    moderate - dental appliance     Sensory polyneuropathy 2014    feet - see neuro consult     VENTRAL HERNIA NEC 6/19/2007       Past Surgical History:   Procedure Laterality Date     COLONOSCOPY  9/30/2013    Procedure: COMBINED COLONOSCOPY, SINGLE BIOPSY/POLYPECTOMY BY BIOPSY;  colonoscopy, polypectomy by biopsy;  Surgeon: Pedro Chris MD;  Location:  GI     COLONOSCOPY N/A 8/7/2017    Procedure: COLONOSCOPY;  colonoscopy;  Surgeon: Kiel Solomon MD;  Location:  GI     DAVINCI XI HERNIORRHAPHY VENTRAL N/A 3/10/2023    Procedure: Robot-assisted laparoscopic ventral hernia repair with mesh;  Surgeon: Clif Lua DO;  Location: [facility]     EXCISE MASS HEAD  11/18/2013    Procedure: EXCISE MASS HEAD;  Excision of left forehead mass;  Surgeon: Pelon Echavarria MD;  Location: [facility]     LASER YAG CAPSULOTOMY Left 6/2/2016    Procedure: LASER YAG CAPSULOTOMY;  Surgeon: Joss Raza MD;  Location: [facility]     PHACOEMULSIFICATION WITH STANDARD INTRAOCULAR LENS IMPLANT Left 11/5/2015    Procedure:  PHACOEMULSIFICATION WITH STANDARD INTRAOCULAR LENS IMPLANT;  Surgeon: Joss Raza MD;  Location: [facility]     ZZHC REPAIR UMBILICAL YVONNE,5+Y/O, INCARCERATED OR STRANGULATED  6/9/2004       Social History     Socioeconomic History     Marital status:      Spouse name: Not on file     Number of children: Not on file     Years of education: Not on file     Highest education level: Not on file   Occupational History     Not on file   Tobacco Use     Smoking status: Never     Smokeless tobacco: Never   Vaping Use     Vaping Use: Never used   Substance and Sexual Activity     Alcohol use: Yes     Alcohol/week: 0.0 standard drinks     Comment: 24 beers per week     Drug use: No     Sexual activity: Yes     Partners: Female     Comment: single, 1 child, work - self employed   Other Topics Concern     Parent/sibling w/ CABG, MI or angioplasty before 65F 55M? Not Asked   Social History Narrative     Not on file     Social Determinants of Health     Financial Resource Strain: Not on file   Food Insecurity: Not on file   Transportation Needs: Not on file   Physical Activity: Not on file   Stress: Not on file   Social Connections: Not on file   Intimate Partner Violence: Not on file   Housing Stability: Not on file       Current Outpatient Medications   Medication Sig Dispense Refill     amLODIPine (NORVASC) 10 MG tablet Take 1 tablet (10 mg) by mouth daily 90 tablet 1     aspirin (ASA) 81 MG tablet Take 1 tablet by mouth daily.       cyanocobalamin (VITAMIN B-12) 1000 MCG tablet Take by mouth daily       hydrochlorothiazide (HYDRODIURIL) 25 MG tablet Take 1 tablet (25 mg) by mouth daily 90 tablet 1     NONFORMULARY        omeprazole 20 MG tablet Take 1 tablet (20 mg) by mouth daily 90 tablet 4     Vitamin A 7.5 MG (11100 UT) CAPS        vitamin D3 (CHOLECALCIFEROL) 250 mcg (77843 units) capsule Take 1 capsule by mouth daily       oxyCODONE-acetaminophen (PERCOCET) 5-325 MG tablet Take 1-2 tablets by mouth every 4  "hours as needed for pain (Patient not taking: Reported on 3/22/2023) 12 tablet 0       Medications and history reviewed    Physical exam:  Vitals: BP (!) 148/90   Ht 1.753 m (5' 9\")   Wt 119.7 kg (264 lb)   BMI 38.99 kg/m    BMI= Body mass index is 38.99 kg/m .    HEART: RRR, no new murmurs  LUNGS: CTAB, equal chest rise, good effort  ABD: soft, non tender, non distended  INCISIONS: Clean dry and intact.  Ventral abdomen with small lump, irreducible.  Consistent with seroma or hematoma  EXT: OCONNOR, no deformities    PATHOLOGY:  None    Assessment:     ICD-10-CM    1. S/P robot-assisted surgical procedure  Z98.890         Plan: Doing pretty well.  Restrictions reviewed and questions answered.  We discussed postoperative seroma and how this might take several weeks to even months to resolve.  If there are no symptoms or signs of infection it is best to leave it alone.  He understands.    20 minutes spent on the date of the encounter doing chart review, history and exam, documentation and further activities per the note    DO Areli Fishn to follow up with Primary Care provider regarding elevated blood pressure.        Again, thank you for allowing me to participate in the care of your patient.        Sincerely,        Clif Lua,     "

## 2023-04-06 ENCOUNTER — MYC MEDICAL ADVICE (OUTPATIENT)
Dept: PODIATRY | Facility: CLINIC | Age: 63
End: 2023-04-06
Payer: COMMERCIAL

## 2023-04-12 ENCOUNTER — TELEPHONE (OUTPATIENT)
Dept: GASTROENTEROLOGY | Facility: CLINIC | Age: 63
End: 2023-04-12
Payer: COMMERCIAL

## 2023-04-12 NOTE — TELEPHONE ENCOUNTER
Screening Questions  BLUE  KIND OF PREP RED  LOCATION [review exclusion criteria] GREEN  SEDATION TYPE    Y  Are you active on mychart?   Elena Sapp MD   Ordering/Referring Provider?    MEDICA  What type of coverage do you have?  N  Have you had a positive covid test in the last 14 days?    36.9  1. BMI  [BMI 40+ - review exclusion criteria - MAC + [facility]]            *NEED PAC APPT AT [facility] + MAC (ONLY)*     SELF   2. Are you able to give consent for your medical care? [IF NO,RN REVIEW]          N  3. Are you taking any prescription pain medications on a routine schedule   (ex narcotics: tramadol, oxycodone, roxicodone, oxycontin,  and percocet)?    [RN Review]             N  3a. EXTENDED PREP What kind of prescription?     N 4. Do you have any chemical dependencies such as alcohol, street drugs, or methadone?    **If yes 3- 5 , please schedule with MAC sedation.**          IF YES TO ANY 6 - 10 - HOSPITAL SETTING ONLY.     N 6.   Do you need assistance transferring?     N 7.   Have you had a heart or lung transplant?    N 8.   Are you currently on dialysis?   N 9.   Do you use daily home oxygen?   N 10. Do you take nitroglycerin?   10a. N If yes, how often?     11. [FEMALES]   Are you currently pregnant?     11a.  If yes, how many weeks? [ Greater than 12 weeks, OR NEEDED]    N 12. [review exclusion criteria]  Do you have any implantable devices in your body (pacemaker, defib, LVAD)?            *NEED PAC APPT AT [facility]*     N 13. Do you have Pulmonary Hypertension?             *NEED PAC APPT AT [facility]*     N 14. In the past 6 months, have you had any heart related issues including cardiomyopathy or heart attack?     N 14a. If yes, did it require cardiac stenting if so when?     N 15. Have you had a stroke or Transient ischemic attack (TIA - aka  mini stroke ) within 6 months?      Y - NI/CPAP 16. Do you have mod to severe Obstructive Sleep Apnea?  [Hospital only]    N 17. Do you have SEVERE AND UNCONTROLLED asthma?  "             *NEED PAC APPT AT [facility]*     N - BABY ASPRIN 18. Are you currently taking any blood thinners?     18a. No. Continue to 19.   18b. Yes/no Blood Thinner: No [CONTINUE TO #19]    N 19. Do you take the medication Phentermine?    19a. If yes, \"Hold for 7 days before procedure.  Please consult your prescribing provider if you have questions about holding this medication.\"     N  20. Do you have chronic kidney disease?      N  21. Do you have a diagnosis of diabetes?     N  22. On a regular basis do you go 3-5 days between bowel movements?     23. Preferred LOCAL Pharmacy for Pre Prescription    [ LIST ONLY ONE PHARMACY]        [facility] - [place] [address] [address]        - CLOSING REMINDERS -    Informed patient they will need an adult          Cannot take any type of public or medical transportation alone    Conscious Sedation- Needs  for 6 hours after the procedure        MAC/General-Needs  for 24 hours after procedure    Pre-Procedure Covid test to be completed         [[place] [facility]/HOME Testing Required] + ADULT to ACCOMPANY     Confirmed Nurse will call to complete assessment       - SCHEDULING DETAILS -      Hospital Setting Required? If yes, what is the exclusion?:  OCTAVIA GAONA   Surgeon   06/19/2023  Date of Procedure  MAC  Sedation Type     N  PAC / Pre-op Required   Location  [facility]        Type of Procedure Scheduled  Lower Endoscopy [Colonoscopy]      Which Colonoscopy Prep was Sent?     MIRALAX GATORADE WITHOUT MAGNEISUM CITRATE           "

## 2023-04-13 ENCOUNTER — TELEPHONE (OUTPATIENT)
Dept: ORTHOPEDICS | Facility: CLINIC | Age: 63
End: 2023-04-13

## 2023-04-13 NOTE — TELEPHONE ENCOUNTER
Received message that the patient would like a call back about his upcoming surgery on 5/15 and that he might have to reschedule. I left the patient a message to call me back directly.  Nga Jordan  Surgery Scheduling Coordinator  Ph: [phone]

## 2023-04-13 NOTE — TELEPHONE ENCOUNTER
Date Scheduled: 10-2-23  Facility: Surgery Locations: [facility]  Surgeon: Dr. Davies   Post-op appointment scheduled:    scheduled?: No  Surgery packet/instructions confirmed received?  Yes  Pre op physical/PAC appointment:   Special Considerations:     Nga Jordan  Surgery Scheduling Coordinator  [phone]

## 2023-04-27 ENCOUNTER — TELEPHONE (OUTPATIENT)
Dept: NEUROSURGERY | Facility: OTHER | Age: 63
End: 2023-04-27
Payer: COMMERCIAL

## 2023-04-27 DIAGNOSIS — D32.9 MENINGIOMA (H): Primary | ICD-10-CM

## 2023-04-27 NOTE — TELEPHONE ENCOUNTER
Reason for Call:  Other appointment    Detailed comments: Radiology calling to state that they need an order added for a MRV of brain with and without contrast - patient already has an MRI set up for Monday- they just need this added on. Radiology will watch for this order and add on once it's placed.     Phone Number Patient can be reached at: Home number on file      Best Time: any      Can we leave a detailed message on this number? YES    Call taken on 4/27/2023 at 8:11 AM by Mayte Ramsey

## 2023-05-01 ENCOUNTER — HOSPITAL ENCOUNTER (OUTPATIENT)
Dept: MRI IMAGING | Facility: CLINIC | Age: 63
Discharge: HOME | End: 2023-05-01
Attending: NEUROLOGICAL SURGERY
Payer: COMMERCIAL

## 2023-05-01 ENCOUNTER — MYC MEDICAL ADVICE (OUTPATIENT)
Dept: NEUROSURGERY | Facility: CLINIC | Age: 63
End: 2023-05-01
Payer: COMMERCIAL

## 2023-05-01 DIAGNOSIS — D32.9 MENINGIOMA (H): ICD-10-CM

## 2023-05-01 PROCEDURE — 70553 MRI BRAIN STEM W/O & W/DYE: CPT

## 2023-05-01 PROCEDURE — 255N000002 HC RX 255 OP 636: Performed by: NEUROLOGICAL SURGERY

## 2023-05-01 PROCEDURE — A9585 GADOBUTROL INJECTION: HCPCS | Performed by: NEUROLOGICAL SURGERY

## 2023-05-01 PROCEDURE — 70546 MR ANGIOGRAPH HEAD W/O&W/DYE: CPT

## 2023-05-01 RX ORDER — GADOBUTROL 604.72 MG/ML
10 INJECTION INTRAVENOUS ONCE
Status: COMPLETED | OUTPATIENT
Start: 2023-05-01 | End: 2023-05-01

## 2023-05-01 RX ADMIN — GADOBUTROL 10 ML: 604.72 INJECTION INTRAVENOUS at 10:43

## 2023-05-01 NOTE — TELEPHONE ENCOUNTER
Dr. Dillard states MRI shows stable meningioma, and MRV is normal.  Repeat MRI in 1 year. Pt updated. Reminder send to RN pool for 1 year.

## 2023-06-19 ENCOUNTER — ANESTHESIA (OUTPATIENT)
Dept: GASTROENTEROLOGY | Facility: CLINIC | Age: 63
End: 2023-06-19
Payer: COMMERCIAL

## 2023-06-19 ENCOUNTER — ANESTHESIA EVENT (OUTPATIENT)
Dept: GASTROENTEROLOGY | Facility: CLINIC | Age: 63
End: 2023-06-19
Payer: COMMERCIAL

## 2023-06-19 ENCOUNTER — HOSPITAL ENCOUNTER (OUTPATIENT)
Facility: CLINIC | Age: 63
Discharge: HOME | End: 2023-06-19
Attending: SURGERY | Admitting: SURGERY
Payer: COMMERCIAL

## 2023-06-19 VITALS
RESPIRATION RATE: 20 BRPM | WEIGHT: 250 LBS | BODY MASS INDEX: 36.92 KG/M2 | OXYGEN SATURATION: 97 % | DIASTOLIC BLOOD PRESSURE: 95 MMHG | TEMPERATURE: 98.4 F | HEART RATE: 75 BPM | SYSTOLIC BLOOD PRESSURE: 151 MMHG

## 2023-06-19 LAB — COLONOSCOPY: NORMAL

## 2023-06-19 PROCEDURE — 250N000009 HC RX 250: Performed by: NURSE ANESTHETIST, CERTIFIED REGISTERED

## 2023-06-19 PROCEDURE — 250N000011 HC RX IP 250 OP 636: Performed by: NURSE ANESTHETIST, CERTIFIED REGISTERED

## 2023-06-19 PROCEDURE — 45380 COLONOSCOPY AND BIOPSY: CPT | Performed by: SURGERY

## 2023-06-19 PROCEDURE — 258N000003 HC RX IP 258 OP 636: Performed by: NURSE ANESTHETIST, CERTIFIED REGISTERED

## 2023-06-19 PROCEDURE — 88305 TISSUE EXAM BY PATHOLOGIST: CPT | Mod: 26 | Performed by: PATHOLOGY

## 2023-06-19 PROCEDURE — 45385 COLONOSCOPY W/LESION REMOVAL: CPT | Performed by: SURGERY

## 2023-06-19 PROCEDURE — 88305 TISSUE EXAM BY PATHOLOGIST: CPT | Mod: TC | Performed by: SURGERY

## 2023-06-19 PROCEDURE — 45385 COLONOSCOPY W/LESION REMOVAL: CPT | Mod: PT | Performed by: SURGERY

## 2023-06-19 PROCEDURE — 370N000017 HC ANESTHESIA TECHNICAL FEE, PER MIN: Performed by: SURGERY

## 2023-06-19 RX ORDER — SODIUM CHLORIDE, SODIUM LACTATE, POTASSIUM CHLORIDE, CALCIUM CHLORIDE 600; 310; 30; 20 MG/100ML; MG/100ML; MG/100ML; MG/100ML
INJECTION, SOLUTION INTRAVENOUS CONTINUOUS PRN
Status: DISCONTINUED | OUTPATIENT
Start: 2023-06-19 | End: 2023-06-19

## 2023-06-19 RX ORDER — ONDANSETRON 4 MG/1
4 TABLET, ORALLY DISINTEGRATING ORAL EVERY 30 MIN PRN
Status: DISCONTINUED | OUTPATIENT
Start: 2023-06-19 | End: 2023-06-19 | Stop reason: HOSPADM

## 2023-06-19 RX ORDER — LIDOCAINE 40 MG/G
CREAM TOPICAL
Status: DISCONTINUED | OUTPATIENT
Start: 2023-06-19 | End: 2023-06-19

## 2023-06-19 RX ORDER — ONDANSETRON 2 MG/ML
4 INJECTION INTRAMUSCULAR; INTRAVENOUS
Status: DISCONTINUED | OUTPATIENT
Start: 2023-06-19 | End: 2023-06-19 | Stop reason: HOSPADM

## 2023-06-19 RX ORDER — ACETAMINOPHEN 325 MG/1
975 TABLET ORAL EVERY 6 HOURS PRN
Status: DISCONTINUED | OUTPATIENT
Start: 2023-06-19 | End: 2023-06-19 | Stop reason: HOSPADM

## 2023-06-19 RX ORDER — NALOXONE HYDROCHLORIDE 0.4 MG/ML
0.2 INJECTION, SOLUTION INTRAMUSCULAR; INTRAVENOUS; SUBCUTANEOUS
Status: DISCONTINUED | OUTPATIENT
Start: 2023-06-19 | End: 2023-06-19 | Stop reason: HOSPADM

## 2023-06-19 RX ORDER — SODIUM CHLORIDE, SODIUM LACTATE, POTASSIUM CHLORIDE, CALCIUM CHLORIDE 600; 310; 30; 20 MG/100ML; MG/100ML; MG/100ML; MG/100ML
INJECTION, SOLUTION INTRAVENOUS CONTINUOUS
Status: DISCONTINUED | OUTPATIENT
Start: 2023-06-19 | End: 2023-06-19 | Stop reason: HOSPADM

## 2023-06-19 RX ORDER — PROPOFOL 10 MG/ML
INJECTION, EMULSION INTRAVENOUS PRN
Status: DISCONTINUED | OUTPATIENT
Start: 2023-06-19 | End: 2023-06-19

## 2023-06-19 RX ORDER — PROPOFOL 10 MG/ML
INJECTION, EMULSION INTRAVENOUS CONTINUOUS PRN
Status: DISCONTINUED | OUTPATIENT
Start: 2023-06-19 | End: 2023-06-19

## 2023-06-19 RX ORDER — ONDANSETRON 2 MG/ML
4 INJECTION INTRAMUSCULAR; INTRAVENOUS EVERY 6 HOURS PRN
Status: DISCONTINUED | OUTPATIENT
Start: 2023-06-19 | End: 2023-06-19 | Stop reason: HOSPADM

## 2023-06-19 RX ORDER — NALOXONE HYDROCHLORIDE 0.4 MG/ML
0.4 INJECTION, SOLUTION INTRAMUSCULAR; INTRAVENOUS; SUBCUTANEOUS
Status: DISCONTINUED | OUTPATIENT
Start: 2023-06-19 | End: 2023-06-19 | Stop reason: HOSPADM

## 2023-06-19 RX ORDER — PROCHLORPERAZINE MALEATE 5 MG
10 TABLET ORAL EVERY 6 HOURS PRN
Status: DISCONTINUED | OUTPATIENT
Start: 2023-06-19 | End: 2023-06-19 | Stop reason: HOSPADM

## 2023-06-19 RX ORDER — LIDOCAINE 40 MG/G
CREAM TOPICAL
Status: DISCONTINUED | OUTPATIENT
Start: 2023-06-19 | End: 2023-06-19 | Stop reason: HOSPADM

## 2023-06-19 RX ORDER — ONDANSETRON 2 MG/ML
4 INJECTION INTRAMUSCULAR; INTRAVENOUS EVERY 30 MIN PRN
Status: DISCONTINUED | OUTPATIENT
Start: 2023-06-19 | End: 2023-06-19 | Stop reason: HOSPADM

## 2023-06-19 RX ORDER — ONDANSETRON 4 MG/1
4 TABLET, ORALLY DISINTEGRATING ORAL EVERY 6 HOURS PRN
Status: DISCONTINUED | OUTPATIENT
Start: 2023-06-19 | End: 2023-06-19 | Stop reason: HOSPADM

## 2023-06-19 RX ORDER — FLUMAZENIL 0.1 MG/ML
0.2 INJECTION, SOLUTION INTRAVENOUS
Status: DISCONTINUED | OUTPATIENT
Start: 2023-06-19 | End: 2023-06-19 | Stop reason: HOSPADM

## 2023-06-19 RX ORDER — LIDOCAINE HYDROCHLORIDE 20 MG/ML
INJECTION, SOLUTION INFILTRATION; PERINEURAL PRN
Status: DISCONTINUED | OUTPATIENT
Start: 2023-06-19 | End: 2023-06-19

## 2023-06-19 RX ADMIN — SODIUM CHLORIDE, POTASSIUM CHLORIDE, SODIUM LACTATE AND CALCIUM CHLORIDE: 600; 310; 30; 20 INJECTION, SOLUTION INTRAVENOUS at 09:25

## 2023-06-19 RX ADMIN — SODIUM CHLORIDE, POTASSIUM CHLORIDE, SODIUM LACTATE AND CALCIUM CHLORIDE 10 ML/HR: 600; 310; 30; 20 INJECTION, SOLUTION INTRAVENOUS at 08:25

## 2023-06-19 RX ADMIN — LIDOCAINE HYDROCHLORIDE 50 MG: 20 INJECTION, SOLUTION INFILTRATION; PERINEURAL at 09:28

## 2023-06-19 RX ADMIN — PROPOFOL 200 MCG/KG/MIN: 10 INJECTION, EMULSION INTRAVENOUS at 09:28

## 2023-06-19 RX ADMIN — PROPOFOL 100 MG: 10 INJECTION, EMULSION INTRAVENOUS at 09:28

## 2023-06-19 ASSESSMENT — ACTIVITIES OF DAILY LIVING (ADL): ADLS_ACUITY_SCORE: 35

## 2023-06-19 NOTE — H&P
Patient seen for Endoscopy    HPI:  Patient is a 62 year old male w hx polyps here for endoscopy. Not taking blood thinning medications. No MI or CVA history. No issues with previous sedation. No recent acute illness.    Review Of Systems    Skin: negative  Ears/Nose/Throat: negative  Respiratory: No shortness of breath, dyspnea on exertion, cough, or hemoptysis  Cardiovascular: negative  Gastrointestinal: negative  Genitourinary: negative  Musculoskeletal: negative  Neurologic: negative  Hematologic/Lymphatic/Immunologic: negative  Endocrine: negative      Past Medical History:   Diagnosis Date     Adenomatous polyp of colon 9/2013    q 5 yr colonoscopy     Bell's palsy 1/17/2007     Deviated septum 9/17/2013    See CT scan     Hypertension      LVH (left ventricular hypertrophy) 6/7/2011    see stress echo     NI (obstructive sleep apnea) 2016    moderate - dental appliance     Sensory polyneuropathy 2014    feet - see neuro consult     VENTRAL HERNIA NEC 6/19/2007       Past Surgical History:   Procedure Laterality Date     COLONOSCOPY  9/30/2013    Procedure: COMBINED COLONOSCOPY, SINGLE BIOPSY/POLYPECTOMY BY BIOPSY;  colonoscopy, polypectomy by biopsy;  Surgeon: Pedro Chris MD;  Location: [facility] GI     COLONOSCOPY N/A 8/7/2017    Procedure: COLONOSCOPY;  colonoscopy;  Surgeon: Kiel Solomon MD;  Location:  GI     DAVINCI XI HERNIORRHAPHY VENTRAL N/A 3/10/2023    Procedure: Robot-assisted laparoscopic ventral hernia repair with mesh;  Surgeon: Clif Lua DO;  Location: [facility] OR     EXCISE MASS HEAD  11/18/2013    Procedure: EXCISE MASS HEAD;  Excision of left forehead mass;  Surgeon: Pelon Echavarria MD;  Location: [facility] OR     LASER YAG CAPSULOTOMY Left 6/2/2016    Procedure: LASER YAG CAPSULOTOMY;  Surgeon: Joss Raza MD;  Location: [facility] OR     PHACOEMULSIFICATION WITH STANDARD INTRAOCULAR LENS IMPLANT Left 11/5/2015    Procedure: PHACOEMULSIFICATION WITH STANDARD INTRAOCULAR  LENS IMPLANT;  Surgeon: Joss Raza MD;  Location: [facility] OR     ZZHC REPAIR UMBILICAL YVONNE,5+Y/O, INCARCERATED OR STRANGULATED  6/9/2004       Family History   Problem Relation Age of Onset     Diabetes Father      Hypertension Father      Heart Disease Father         double bypass-smoker     Cancer Father         lung, brain     Hypertension Brother      Hypertension Mother      Unknown/Adopted Maternal Grandmother      Unknown/Adopted Maternal Grandfather      Unknown/Adopted Paternal Grandmother      Unknown/Adopted Paternal Grandfather        Social History     Socioeconomic History     Marital status:      Spouse name: Not on file     Number of children: Not on file     Years of education: Not on file     Highest education level: Not on file   Occupational History     Not on file   Tobacco Use     Smoking status: Never     Smokeless tobacco: Never   Vaping Use     Vaping status: Never Used   Substance and Sexual Activity     Alcohol use: Yes     Alcohol/week: 0.0 standard drinks of alcohol     Comment: 24 beers per week     Drug use: No     Sexual activity: Yes     Partners: Female     Comment: single, 1 child, work - self employed   Other Topics Concern     Parent/sibling w/ CABG, MI or angioplasty before 65F 55M? Not Asked   Social History Narrative     Not on file     Social Determinants of Health     Financial Resource Strain: Not on file   Food Insecurity: Not on file   Transportation Needs: Not on file   Physical Activity: Not on file   Stress: Not on file   Social Connections: Not on file   Intimate Partner Violence: Not on file   Housing Stability: Not on file       No current outpatient medications on file.       Medications and history reviewed    Physical exam:  Vitals: BP (!) 152/107   Temp 98.4  F (36.9  C) (Oral)   Resp 20   Wt 113.4 kg (250 lb)   SpO2 98%   BMI 36.92 kg/m    BMI= Body mass index is 36.92 kg/m .    Constitutional: Healthy, alert, non-distressed   Head:  Normo-cephalic, atraumatic, no lesions, masses or tenderness   Cardiovascular: RRR, no new murmurs, +S1, +S2 heart sounds, no clicks, rubs or gallops   Respiratory: CTAB, no rales, rhonchi or wheezing, equal chest rise, good respiratory effort   Gastrointestinal: Soft, non-tender, non distended, no rebound rigidity or guarding, no masses or hernias palpated   : Deferred  Musculoskeletal: Moves all extremities, normal  strength, no deformities noted   Skin: No suspicious lesions or rashes   Psychiatric: Mentation appears normal, affect appropriate   Hematologic/Lymphatic/Immunologic: Normal cervical and supraclavicular lymph nodes   Patient able to get up on table without difficulty.    Labs show:  No results found for this or any previous visit (from the past 24 hour(s)).    Assessment: Endoscopy  Plan: Pt cleared for anesthesia for proposed procedure.    Clif Lua, DO

## 2023-06-19 NOTE — ANESTHESIA CARE TRANSFER NOTE
Patient: Jeevan Lopez    Procedure: Procedure(s):  COLONOSCOPY, WITH POLYPECTOMY AND BIOPSY       Diagnosis: Screen for colon cancer [Z12.11]  Diagnosis Additional Information: No value filed.    Anesthesia Type:   MAC     Note:    Oropharynx: oropharynx clear of all foreign objects and spontaneously breathing  Level of Consciousness: awake  Oxygen Supplementation: room air    Independent Airway: airway patency satisfactory and stable  Dentition: dentition unchanged  Vital Signs Stable: post-procedure vital signs reviewed and stable  Report to RN Given: handoff report given  Patient transferred to: Phase II    Handoff Report: Identifed the Patient, Identified the Reponsible Provider, Reviewed the pertinent medical history, Discussed the surgical course, Reviewed Intra-OP anesthesia mangement and issues during anesthesia, Set expectations for post-procedure period and Allowed opportunity for questions and acknowledgement of understanding      Vitals:  Vitals Value Taken Time   BP     Temp     Pulse     Resp     SpO2 98 % 06/19/23 1000   Vitals shown include unvalidated device data.    Electronically Signed By: PRASAD Negrete CRNA  June 19, 2023  10:01 AM   PT RESTING WELL TO CART WITH MIN PAIN. REMAINS TO TELE AND CONT PULSE OX. HR CONTROLLED, SINUS RHYTHM AT 80BPM. NO NAUSEA, DIAPHORESIS OR VOMITING. PT DOES COMPLAIN OF CP 5/10 BUT NO DISTRESS. FAMILY REMAINS TO BEDSIDE.

## 2023-06-19 NOTE — LETTER
Jeevan Lopez  [address] [facility] [address]  June 21, 2023    Dear Jeevan,  This letter is to inform you of the results of your pathology report from your colonoscopy.  Your pathology report was:  Final Diagnosis   A(1).  Colon, Transverse, polyps x2, polypectomy:  -Tubular adenomas, fragments  -Negative for high-grade dysplasia and malignancy.  B(2).   Colon, Sigmoid, polyp, polypectomy:  -Tubular adenoma, fragments  -Negative for high-grade dysplasia and malignancy.   These are benign polyps. These types of polyps do carry a small risk of developing into a cancer over time if not removed. Yours were completely removed at the time of your colonoscopy. You should have another surveillance colonoscopy in 3 years.  If you have further questions please don t hesitate to call our clinic at [phone].   Sincerely,     Clif Lua, DO

## 2023-06-19 NOTE — ANESTHESIA POSTPROCEDURE EVALUATION
Patient: Jeevan Lopez    Procedure: Procedure(s):  COLONOSCOPY, WITH POLYPECTOMY AND BIOPSY       Anesthesia Type:  MAC    Note:  Disposition: Outpatient   Postop Pain Control: Uneventful            Sign Out: Well controlled pain   PONV: No   Neuro/Psych: Uneventful            Sign Out: Acceptable/Baseline neuro status   Airway/Respiratory: Uneventful            Sign Out: Acceptable/Baseline resp. status   CV/Hemodynamics: Uneventful            Sign Out: Acceptable CV status   Other NRE: NONE   DID A NON-ROUTINE EVENT OCCUR? No    Event details/Postop Comments:  Pt was happy with anesthesia care.  No complications.  I will follow up with the pt if needed.           Last vitals:  Vitals Value Taken Time   /82 06/19/23 1000   Temp     Pulse 75 06/19/23 1000   Resp     SpO2 97 % 06/19/23 1007   Vitals shown include unvalidated device data.    Electronically Signed By: PRASAD Negrete CRNA  June 19, 2023  10:15 AM

## 2023-06-19 NOTE — DISCHARGE INSTRUCTIONS
[facility]    Home Care Following Endoscopy          Activity:  You have just undergone an endoscopic procedure performed with sedation.  Do not work or operate machinery (including a car) for at least 12 hours.      Diet:  Return to the diet you were on before your procedure but eat lightly for the first 12-24 hours.  Drink plenty of water.  Resume any regular medications unless otherwise advised by your physician.  Please begin any new medication prescribed as a result of your procedure as directed by your physician.   If you had any biopsy or polyp removed please refrain from aspirin or aspirin products for 2 days.  If on Coumadin please restart as instructed by your physician.   Pain:  You may take Tylenol as needed for pain.  Expected Recovery:  You can expect some mild abdominal fullness and/or discomfort due to the air used to inflate your intestinal tract. I encourage you to walk and attempt to pass this air as soon as possible.    Call Your Physician if You Have:  After Colonoscopy:  Worsening persisting abdominal pain which is worse with activity.  Fevers (>101 degrees F), chills or shakes.  Passage of continued blood with bowel movements.     Any questions or concerns about your recovery, please call [phone] or after hours [phone] ([phone]) Nurse Advice Line.    Follow-up Care:  You did have polyps/biopsy tissue sample(s) removed.  The polyps/biopsy tissue sample(s) will be sent to pathology.    You should receive letter in your My Chart from Dr. Lua with your results within 1-2 weeks.   Please call if you have not received a notification of your results.

## 2023-06-19 NOTE — ANESTHESIA PREPROCEDURE EVALUATION
Anesthesia Pre-Procedure Evaluation    Patient: Jeevan Lopez   MRN: [id number] : 1960        Procedure : Procedure(s):  Colonoscopy            Past Medical History:   Diagnosis Date     Adenomatous polyp of colon 2013    q 5 yr colonoscopy     Bell's palsy 2007     Deviated septum 2013    See CT scan     Hypertension      LVH (left ventricular hypertrophy) 2011    see stress echo     NI (obstructive sleep apnea)     moderate - dental appliance     Sensory polyneuropathy 2014    feet - see neuro consult     VENTRAL HERNIA NEC 2007      Past Surgical History:   Procedure Laterality Date     COLONOSCOPY  2013    Procedure: COMBINED COLONOSCOPY, SINGLE BIOPSY/POLYPECTOMY BY BIOPSY;  colonoscopy, polypectomy by biopsy;  Surgeon: Pedro Chris MD;  Location:  GI     COLONOSCOPY N/A 2017    Procedure: COLONOSCOPY;  colonoscopy;  Surgeon: Kiel Solomon MD;  Location:  GI     DAVINCI XI HERNIORRHAPHY VENTRAL N/A 3/10/2023    Procedure: Robot-assisted laparoscopic ventral hernia repair with mesh;  Surgeon: Clif Lua DO;  Location: [facility]     EXCISE MASS HEAD  2013    Procedure: EXCISE MASS HEAD;  Excision of left forehead mass;  Surgeon: Pelon Echavarria MD;  Location: [facility]     LASER YAG CAPSULOTOMY Left 2016    Procedure: LASER YAG CAPSULOTOMY;  Surgeon: Joss Raza MD;  Location: [facility]     PHACOEMULSIFICATION WITH STANDARD INTRAOCULAR LENS IMPLANT Left 2015    Procedure: PHACOEMULSIFICATION WITH STANDARD INTRAOCULAR LENS IMPLANT;  Surgeon: Joss Raza MD;  Location: [facility]     ZZHC REPAIR UMBILICAL YVONNE,5+Y/O, INCARCERATED OR STRANGULATED  2004      Allergies   Allergen Reactions     Lisinopril Other (See Comments)     Dry hacky cough      Social History     Tobacco Use     Smoking status: Never     Smokeless tobacco: Never   Vaping Use     Vaping status: Never Used   Substance Use Topics     Alcohol use: Yes      Alcohol/week: 0.0 standard drinks of alcohol     Comment: 24 beers per week      Wt Readings from Last 1 Encounters:   03/22/23 119.7 kg (264 lb)        Anesthesia Evaluation   Pt has had prior anesthetic. Type: General and MAC.        ROS/MED HX  ENT/Pulmonary:     (+) sleep apnea, mild, uses CPAP,     Neurologic:  - neg neurologic ROS     Cardiovascular:     (+) hypertension-----Previous cardiac testing   Echo: Date: Results:    Stress Test: Date: 11/29/2017 Results:  Interpretation Summary  Normal resting heart rate and blood pressure with normal response to exercise  Normal exercise capacity for age  Normal resting ECG without ischemic changes postexercise. Bigeminal PVCs seen  in the recovery phase  Normal resting LV function with good augmentation of all wall segments  postexercise. No wall motion abnormalities are seen.  This test indicates low probability of significant exercise induced myocardial  ischemia.  ECG Reviewed: Date: 02/20/23 Results:  - NSR   Cath: Date: Results:      METS/Exercise Tolerance:     Hematologic:  - neg hematologic  ROS     Musculoskeletal:  - neg musculoskeletal ROS     GI/Hepatic:     (+) GERD, Asymptomatic on medication,     Renal/Genitourinary:       Endo:     (+) Obesity,     Psychiatric/Substance Use:  - neg psychiatric ROS     Infectious Disease:       Malignancy:  - neg malignancy ROS     Other:            Physical Exam    Airway        Mallampati: II   TM distance: > 3 FB   Neck ROM: full   Mouth opening: > 3 cm    Respiratory Devices and Support         Dental       (+) Minor Abnormalities - some fillings, tiny chips      Cardiovascular   cardiovascular exam normal          Pulmonary   pulmonary exam normal                OUTSIDE LABS:  CBC:   Lab Results   Component Value Date    WBC 10.5 01/27/2023    WBC 7.2 09/23/2021    HGB 15.5 02/28/2023    HGB 16.5 01/27/2023    HCT 46.4 02/28/2023    HCT 50.1 01/27/2023     01/27/2023     09/23/2021     BMP:    Lab Results   Component Value Date     02/28/2023     01/27/2023    POTASSIUM 3.8 02/28/2023    POTASSIUM 4.2 01/27/2023    CHLORIDE 98 02/28/2023    CHLORIDE 98 01/27/2023    CO2 27 02/28/2023    CO2 28 01/27/2023    BUN 9.3 02/28/2023    BUN 10.9 01/27/2023    CR 0.80 02/28/2023    CR 0.88 01/27/2023     (H) 02/28/2023     (H) 01/27/2023     COAGS: No results found for: PTT, INR, FIBR  POC: No results found for: BGM, HCG, HCGS  HEPATIC:   Lab Results   Component Value Date    ALBUMIN 4.4 01/27/2023    PROTTOTAL 7.2 01/27/2023    ALT 11 01/27/2023    AST 22 01/27/2023    ALKPHOS 83 01/27/2023    BILITOTAL 0.6 01/27/2023     OTHER:   Lab Results   Component Value Date    A1C 5.4 01/27/2023    HAYLEY 9.5 02/28/2023    TSH 1.43 01/27/2023    CRP 12.2 11/28/2017    SED 5 09/17/2014       Anesthesia Plan    ASA Status:  3   NPO Status:  NPO Appropriate    Anesthesia Type: MAC.   Induction: Intravenous, Propofol.           Consents    Anesthesia Plan(s) and associated risks, benefits, and realistic alternatives discussed. Questions answered and patient/representative(s) expressed understanding.     - Discussed: Risks, Benefits and Alternatives for BOTH SEDATION and the PROCEDURE were discussed     - Discussed with:  Patient      - Extended Intubation/Ventilatory Support Discussed: No.      - Patient is DNR/DNI Status: No    Use of blood products discussed: Yes.     - Discussed with: Patient.     Postoperative Care    Pain management: IV analgesics.        Comments:    Other Comments: The risks and benefits of anesthesia, and the alternatives where applicable, have been discussed with the patient, and they wish to proceed.                PRASAD Negrete CRNA   no

## 2023-06-20 LAB
PATH REPORT.COMMENTS IMP SPEC: NORMAL
PATH REPORT.COMMENTS IMP SPEC: NORMAL
PATH REPORT.FINAL DX SPEC: NORMAL
PATH REPORT.GROSS SPEC: NORMAL
PATH REPORT.MICROSCOPIC SPEC OTHER STN: NORMAL
PATH REPORT.RELEVANT HX SPEC: NORMAL
PHOTO IMAGE: NORMAL
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
## 2023-07-28 ENCOUNTER — OFFICE VISIT (OUTPATIENT)
Dept: NEUROSURGERY | Facility: CLINIC | Age: 63
End: 2023-07-28
Payer: COMMERCIAL

## 2023-07-28 ENCOUNTER — PRE VISIT (OUTPATIENT)
Dept: NEUROSURGERY | Facility: CLINIC | Age: 63
End: 2023-07-28

## 2023-07-28 VITALS
BODY MASS INDEX: 37.03 KG/M2 | HEART RATE: 95 BPM | DIASTOLIC BLOOD PRESSURE: 92 MMHG | SYSTOLIC BLOOD PRESSURE: 160 MMHG | HEIGHT: 69 IN | WEIGHT: 250 LBS

## 2023-07-28 DIAGNOSIS — R20.0 NUMBNESS AND TINGLING IN RIGHT HAND: Primary | ICD-10-CM

## 2023-07-28 DIAGNOSIS — R20.2 NUMBNESS AND TINGLING IN RIGHT HAND: Primary | ICD-10-CM

## 2023-07-28 PROCEDURE — 99214 OFFICE O/P EST MOD 30 MIN: CPT | Performed by: PHYSICIAN ASSISTANT

## 2023-07-28 ASSESSMENT — PAIN SCALES - GENERAL: PAINLEVEL: MILD PAIN (2)

## 2023-07-28 NOTE — PROGRESS NOTES
Neurosurgery Consult    HPI    Mr. Lopez is a 62-year-old male who presents to clinic for evaluation of numbness in his right hand between his second and third digits.  This began a few years ago when he had severe radicular pain in his right arm, he did not undergo imaging at that time.  He manages it with traction and chiropractic care.  He did not have good insurance at the time so did not pursue imaging.  He is now concerned because he feels like the numbness is starting to progress slightly in his right hand.  He also reports right suprascapular soreness.  He denies any other radicular symptoms in his upper or lower extremities.    Medical history  Brain meningioma    Social history  Self-employed      B/P: 160/92, T: Data Unavailable, P: 95, R: Data Unavailable       Exam    Alert and oriented no acute distress  Bilateral upper extremities with 5/5 strength  Ashley sign negative  Reflexes 2+ triceps, biceps, brachioradialis    Bilateral lower extremities with 5/5 strength  Reflexes 2+ patella/ankle  Negative straight leg raise bilaterally  Negative ankle clonus negative Babinski bilaterally  Gait is normal    Imaging    No recent cervical imaging to review    Assessment    Neck pain, right hand numbness    Plan:      Recommend the patient obtain a cervical MRI without contrast to evaluate the progressive and constant numbness in his right hand and I will contact him with results when they are available.

## 2023-07-28 NOTE — LETTER
7/28/2023         RE: Jeevan Lopez  [address] [facility] [address]        Dear Colleague,    Thank you for referring your patient, Jeevan Lopez, to the [facility] [place]. Please see a copy of my visit note below.    Neurosurgery Consult    HPI    Mr. Lopez is a 62-year-old male who presents to clinic for evaluation of numbness in his right hand between his second and third digits.  This began a few years ago when he had severe radicular pain in his right arm, he did not undergo imaging at that time.  He manages it with traction and chiropractic care.  He did not have good insurance at the time so did not pursue imaging.  He is now concerned because he feels like the numbness is starting to progress slightly in his right hand.  He also reports right suprascapular soreness.  He denies any other radicular symptoms in his upper or lower extremities.    Medical history  Brain meningioma    Social history  Self-employed      B/P: 160/92, T: Data Unavailable, P: 95, R: Data Unavailable       Exam    Alert and oriented no acute distress  Bilateral upper extremities with 5/5 strength  Ashley sign negative  Reflexes 2+ triceps, biceps, brachioradialis    Bilateral lower extremities with 5/5 strength  Reflexes 2+ patella/ankle  Negative straight leg raise bilaterally  Negative ankle clonus negative Babinski bilaterally  Gait is normal    Imaging    No recent cervical imaging to review    Assessment    Neck pain, right hand numbness    Plan:      Recommend the patient obtain a cervical MRI without contrast to evaluate the progressive and constant numbness in his right hand and I will contact him with results when they are available.      Again, thank you for allowing me to participate in the care of your patient.        Sincerely,        George Flores PA-C
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
## 2023-08-14 ENCOUNTER — HOSPITAL ENCOUNTER (OUTPATIENT)
Dept: MRI IMAGING | Facility: CLINIC | Age: 63
Discharge: HOME | End: 2023-08-14
Attending: PHYSICIAN ASSISTANT | Admitting: PHYSICIAN ASSISTANT
Payer: COMMERCIAL

## 2023-08-14 DIAGNOSIS — R20.0 NUMBNESS AND TINGLING IN RIGHT HAND: ICD-10-CM

## 2023-08-14 DIAGNOSIS — R20.2 NUMBNESS AND TINGLING IN RIGHT HAND: ICD-10-CM

## 2023-08-14 PROCEDURE — 72141 MRI NECK SPINE W/O DYE: CPT

## 2023-08-15 ENCOUNTER — MYC MEDICAL ADVICE (OUTPATIENT)
Dept: NEUROSURGERY | Facility: CLINIC | Age: 63
End: 2023-08-15
Payer: COMMERCIAL

## 2023-08-15 DIAGNOSIS — R20.2 NUMBNESS AND TINGLING IN RIGHT HAND: Primary | ICD-10-CM

## 2023-08-15 DIAGNOSIS — R20.0 NUMBNESS AND TINGLING IN RIGHT HAND: Primary | ICD-10-CM

## 2023-08-30 ENCOUNTER — THERAPY VISIT (OUTPATIENT)
Dept: PHYSICAL THERAPY | Facility: CLINIC | Age: 63
End: 2023-08-30
Attending: FAMILY MEDICINE
Payer: COMMERCIAL

## 2023-08-30 DIAGNOSIS — R20.2 NUMBNESS AND TINGLING IN RIGHT HAND: ICD-10-CM

## 2023-08-30 DIAGNOSIS — R20.0 NUMBNESS AND TINGLING IN RIGHT HAND: ICD-10-CM

## 2023-08-30 PROCEDURE — 97110 THERAPEUTIC EXERCISES: CPT | Mod: GP | Performed by: PHYSICAL THERAPIST

## 2023-08-30 PROCEDURE — 97161 PT EVAL LOW COMPLEX 20 MIN: CPT | Mod: GP | Performed by: PHYSICAL THERAPIST
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
## 2023-09-06 ENCOUNTER — THERAPY VISIT (OUTPATIENT)
Dept: PHYSICAL THERAPY | Facility: CLINIC | Age: 63
End: 2023-09-06
Attending: PHYSICIAN ASSISTANT
Payer: COMMERCIAL

## 2023-09-06 DIAGNOSIS — R20.2 NUMBNESS AND TINGLING IN RIGHT HAND: Primary | ICD-10-CM

## 2023-09-06 DIAGNOSIS — R20.0 NUMBNESS AND TINGLING IN RIGHT HAND: Primary | ICD-10-CM

## 2023-09-06 PROCEDURE — 97110 THERAPEUTIC EXERCISES: CPT | Mod: GP | Performed by: PHYSICAL THERAPIST

## 2023-09-06 PROCEDURE — 97012 MECHANICAL TRACTION THERAPY: CPT | Mod: GP | Performed by: PHYSICAL THERAPIST

## 2023-09-21 NOTE — PROGRESS NOTES
Ortho Navigator Note      Pre-op Date 9/25/23     Medical Clearance  APPROVAL GIVEN to proceed with proposed procedure, without further diagnostic evaluation.      Labs Stable      COVID Test Date No longer indicated      Skin  Intact      Activity: Ambulates independently without assistive device      Equipment Need Patient will likely need a walker for discharge. Defer to PT/OT for recs.       Meds to Hold Held all supplements 14 days prior to surgery  Hold hydrochlorothiazide on DOS  * Medication recommendations are not intended to be exhaustive; they are limited to common medications that are potentially dangerous if incorrectly managed   NPO Instructions  Instructed to stop solids 8 hr prior to arrival and clears 2 hr prior to arrival.       Pre-op Joint Education Complete? Complete   Discharge Plan Patient has plan to discharge home on morning of POD 1.   Significant other Roseanne will arrive at hospital at 0800 to participate in therapy and discharge education. They will then transport patient home    /Transportation Roseanne will be .  is physically able to care for patient.      Barriers to Discharge No barriers to discharge.      Additional Info/   Special Needs : Patient had no unanswered questions or concerns.           09/21/23 5429   Discharge Planning   Patient/Family Anticipates Transition to home with family   Concerns to be Addressed no discharge needs identified   Living Arrangements   People in Home significant other   Type of Residence Private Residence   Is your private residence a single family home or apartment? Single family home   Number of Stairs, Within Home, Primary seven   Stair Railings, Within Home, Primary railings safe and in good condition   Once home, are you able to live on one level? Yes   Bathroom Shower/Tub Walk-in shower   Equipment Currently Used at Home none   Support System   Support Systems Spouse/Significant Other   Do you have someone available to stay with  you one or two nights once you are home? Yes   Medical Clearance   Date of Physical 09/25/23   Blood   Known Bleeding Disorder or Coagulopathy No   Does the patient have any Roman Catholic/cultural preferences related to blood products? No   Education   Has the patient scheduled or completed pre-op total joint education, either in class or online, in the last 12 months? No   Patient attended total joint pre-op class/received pre-op teaching  online   Relationship/Living Environment   Name(s) of People in Home Roseanne (S/O)

## 2023-09-25 ENCOUNTER — OFFICE VISIT (OUTPATIENT)
Dept: FAMILY MEDICINE | Facility: OTHER | Age: 63
End: 2023-09-25
Payer: COMMERCIAL

## 2023-09-25 VITALS
RESPIRATION RATE: 18 BRPM | OXYGEN SATURATION: 98 % | HEART RATE: 74 BPM | TEMPERATURE: 98.3 F | WEIGHT: 267 LBS | DIASTOLIC BLOOD PRESSURE: 82 MMHG | BODY MASS INDEX: 39.55 KG/M2 | SYSTOLIC BLOOD PRESSURE: 166 MMHG | HEIGHT: 69 IN

## 2023-09-25 DIAGNOSIS — Z01.818 PREOP GENERAL PHYSICAL EXAM: Primary | ICD-10-CM

## 2023-09-25 DIAGNOSIS — F10.20 ALCOHOL DEPENDENCE, UNCOMPLICATED (H): ICD-10-CM

## 2023-09-25 DIAGNOSIS — M17.11 PRIMARY OSTEOARTHRITIS OF RIGHT KNEE: ICD-10-CM

## 2023-09-25 DIAGNOSIS — I10 HYPERTENSION GOAL BP (BLOOD PRESSURE) < 140/90: ICD-10-CM

## 2023-09-25 DIAGNOSIS — G47.33 OSA (OBSTRUCTIVE SLEEP APNEA): ICD-10-CM

## 2023-09-25 PROCEDURE — 99214 OFFICE O/P EST MOD 30 MIN: CPT | Performed by: PHYSICIAN ASSISTANT

## 2023-09-25 RX ORDER — AMLODIPINE BESYLATE 10 MG/1
10 TABLET ORAL DAILY
Qty: 90 TABLET | Refills: 3 | Status: SHIPPED | OUTPATIENT
Start: 2023-09-25

## 2023-09-25 ASSESSMENT — PAIN SCALES - GENERAL: PAINLEVEL: NO PAIN (0)

## 2023-09-25 NOTE — H&P (VIEW-ONLY)
[address]  [facility] [address]  Phone: [phone]  Primary Provider: Alistair Martinez  Pre-op Performing Provider: JET NICOLAS    PREOPERATIVE EVALUATION:  Today's date: 9/25/2023    Jeevan is a 62 year old male who presents for a preoperative evaluation.      9/25/2023     9:56 AM   Additional Questions   Roomed by Margaret   Accompanied by Self         9/25/2023     9:56 AM   Patient Reported Additional Medications   Patient reports taking the following new medications none       Surgical Information:  Surgery/Procedure: ARTHROPLASTY, RIGHT KNEE, TOTAL   Surgery Location: [facility]  Surgeon: Darian Davies MD   Surgery Date: 10/02/2023  Time of Surgery: 7:30 am  Where patient plans to recover: At home with family  Fax number for surgical facility: Note does not need to be faxed, will be available electronically in Epic.    Assessment & Plan     The proposed surgical procedure is considered INTERMEDIATE risk.      ICD-10-CM    1. Preop general physical exam  Z01.818       2. Primary osteoarthritis of right knee  M17.11       3. Hypertension goal BP (blood pressure) < 140/90  I10 amLODIPine (NORVASC) 10 MG tablet      4. NI (obstructive sleep apnea)  G47.33       5. Alcoholism /alcohol abuse  F10.20           Blood pressure elevated during both readings today but he did not take his amlodipine today. He also stopped his hydrochlorithiazide because he has heard about possible cancer side effects. I discussed trying chlorthalidone instead but he declines at this time. He will monitor home pressures closely and if consistently above 140/90, he will contact the clinic.    He drinks 4 beers nightly and I strongly recommend he cut back on this and ideally quit. He denies any hard alcohol use and states he has quit for 9 months previously without issues. He does not sound as interested in quitting at this time.  LFTs normal in January.      - No identified additional risk factors other than previously addressed    Antiplatelet or Anticoagulation Medication Instructions:   - aspirin: Discontinue aspirin 7-10 days prior to procedure to reduce bleeding risk. It should be resumed postoperatively.     Additional Medication Instructions:  Patient is to take all scheduled medications on the day of surgery    RECOMMENDATION:  APPROVAL GIVEN to proceed with proposed procedure, without further diagnostic evaluation.    Subjective       HPI related to upcoming procedure: He has worsening right knee osteoarthritis so will be undergoing a total knee arthroplasty as noted above.         9/25/2023     7:40 AM   Preop Questions   1. Have you ever had a heart attack or stroke? No   2. Have you ever had surgery on your heart or blood vessels, such as a stent placement, a coronary artery bypass, or surgery on an artery in your head, neck, heart, or legs? No   3. Do you have chest pain with activity? No   4. Do you have a history of  heart failure? No   5. Do you currently have a cold, bronchitis or symptoms of other infection? No   6. Do you have a cough, shortness of breath, or wheezing? No   7. Do you or anyone in your family have previous history of blood clots? No   8. Do you or does anyone in your family have a serious bleeding problem such as prolonged bleeding following surgeries or cuts? No   9. Have you ever had problems with anemia or been told to take iron pills? No   10. Have you had any abnormal blood loss such as black, tarry or bloody stools? No   11. Have you ever had a blood transfusion? No   12. Are you willing to have a blood transfusion if it is medically needed before, during, or after your surgery? Yes   13. Have you or any of your relatives ever had problems with anesthesia? YES - took awhile for anaesthesia effects to wear off in March 2023    14. Do you have sleep apnea, excessive snoring or daytime drowsiness? YES -  CPAP   14a. Do you have a CPAP machine? Yes   15. Do you have any artifical heart valves or other implanted medical devices like a pacemaker, defibrillator, or continuous glucose monitor? No   16. Do you have artificial joints? No   17. Are you allergic to latex? No       Health Care Directive:  Patient has a Health Care Directive on file    Status of Chronic Conditions:  See problem list for active medical problems.  Problems all longstanding and stable, except as noted/documented.  See ROS for pertinent symptoms related to these conditions.    Review of Systems  CONSTITUTIONAL: NEGATIVE for fever, chills, change in weight  INTEGUMENTARY/SKIN: NEGATIVE for worrisome rashes, moles or lesions  EYES: NEGATIVE for vision changes or irritation  ENT/MOUTH: NEGATIVE for ear, mouth and throat problems  RESP: NEGATIVE for significant cough or SOB  CV: NEGATIVE for chest pain, palpitations or peripheral edema  GI: NEGATIVE for nausea, abdominal pain, heartburn, or change in bowel habits  : NEGATIVE for frequency, dysuria, or hematuria  MUSCULOSKELETAL: +Right knee pain. NEGATIVE for myalgia  NEURO: NEGATIVE for weakness, dizziness or paresthesias  ENDOCRINE: NEGATIVE for temperature intolerance, skin/hair changes  HEME: NEGATIVE for bleeding problems  PSYCHIATRIC: NEGATIVE for changes in mood or affect    Patient Active Problem List    Diagnosis Date Noted    Numbness and tingling in right hand 08/30/2023     Priority: Medium    Meningioma (H) 01/27/2023     Priority: Medium    Morbid obesity (H) 07/29/2017     Priority: Medium    Alcoholic peripheral neuropathy (H) 07/29/2017     Priority: Medium    Alcoholism /alcohol abuse 07/29/2017     Priority: Medium     Replacing diagnoses that were inactivated after the 10/1/2021 regulatory import.      NI (obstructive sleep apnea)      Priority: Medium     moderate - Using CPAP consistently for the past 2 yrs      Anxiety 07/21/2014     Priority: Medium    ED (erectile  dysfunction) 02/07/2014     Priority: Medium    Deviated septum 09/17/2013     Priority: Medium     See CT scan      Chronic rhinitis 09/09/2013     Priority: Medium    GERD (gastroesophageal reflux disease) 09/09/2013     Priority: Medium    Adenomatous polyp of colon 09/01/2013     Priority: Medium     q 5 yr colonoscopy      Hyperlipidemia LDL goal <130 05/10/2011     Priority: Medium    Hypertension goal BP (blood pressure) < 140/90 12/20/2010     Priority: Medium      Past Medical History:   Diagnosis Date    Adenomatous polyp of colon 09/2013    q 5 yr colonoscopy    Bell's palsy 01/17/2007    Deviated septum 09/17/2013    See CT scan    Hypertension     LVH (left ventricular hypertrophy) 06/07/2011    see stress echo    NI (obstructive sleep apnea) 2016    moderate - dental appliance    Sensory polyneuropathy 2014    feet - see neuro consult    VENTRAL HERNIA NEC 06/19/2007     Past Surgical History:   Procedure Laterality Date    ABDOMEN SURGERY  March 2023    hernia    COLONOSCOPY  09/30/2013    Procedure: COMBINED COLONOSCOPY, SINGLE BIOPSY/POLYPECTOMY BY BIOPSY;  colonoscopy, polypectomy by biopsy;  Surgeon: Pedro Chris MD;  Location: [facility]    COLONOSCOPY N/A 08/07/2017    Procedure: COLONOSCOPY;  colonoscopy;  Surgeon: Kiel Solomon MD;  Location: [facility]    COLONOSCOPY N/A 06/19/2023    Procedure: COLONOSCOPY, WITH POLYPECTOMY AND BIOPSY;  Surgeon: Clif Lua DO;  Location:  GI    DAVINCI XI HERNIORRHAPHY VENTRAL N/A 03/10/2023    Procedure: Robot-assisted laparoscopic ventral hernia repair with mesh;  Surgeon: Clif Lua DO;  Location: [facility]    EXCISE MASS HEAD  11/18/2013    Procedure: EXCISE MASS HEAD;  Excision of left forehead mass;  Surgeon: Pelon Echavarria MD;  Location: [facility]    LASER YAG CAPSULOTOMY Left 06/02/2016    Procedure: LASER YAG CAPSULOTOMY;  Surgeon: Joss Raza MD;  Location: [facility]    PHACOEMULSIFICATION WITH STANDARD  "INTRAOCULAR LENS IMPLANT Left 11/05/2015    Procedure: PHACOEMULSIFICATION WITH STANDARD INTRAOCULAR LENS IMPLANT;  Surgeon: Joss Raza MD;  Location: [facility]    ZZHC REPAIR UMBILICAL YVONNE,5+Y/O, INCARCERATED OR STRANGULATED  06/09/2004     Current Outpatient Medications   Medication Sig Dispense Refill    amLODIPine (NORVASC) 10 MG tablet Take 1 tablet (10 mg) by mouth daily 90 tablet 3    cyanocobalamin (VITAMIN B-12) 1000 MCG tablet Take by mouth daily      omeprazole 20 MG tablet Take 1 tablet (20 mg) by mouth daily 90 tablet 4    Vitamin A 7.5 MG (47925 UT) CAPS       vitamin D3 (CHOLECALCIFEROL) 250 mcg (97194 units) capsule Take 1 capsule by mouth daily      aspirin (ASA) 81 MG tablet Take 1 tablet by mouth daily. (Patient not taking: Reported on 9/25/2023)      NONFORMULARY  (Patient not taking: Reported on 9/25/2023)         Allergies   Allergen Reactions    Lisinopril Other (See Comments)     Dry hacky cough        Social History     Tobacco Use    Smoking status: Never    Smokeless tobacco: Never   Substance Use Topics    Alcohol use: Yes     Comment: beer only, and occionally     History   Drug Use No         Objective     BP (!) 166/82 (BP Location: Right arm, Patient Position: Sitting, Cuff Size: Adult Large)   Pulse 74   Temp 98.3  F (36.8  C) (Temporal)   Resp 18   Ht 1.753 m (5' 9\")   Wt 121.1 kg (267 lb)   SpO2 98%   BMI 39.43 kg/m      Physical Exam    GENERAL APPEARANCE: healthy, alert and no distress     EYES: EOMI,  PERRL     HENT: ear canals and TM's normal and nose and mouth without ulcers or lesions     NECK: no adenopathy, no asymmetry, masses, or scars and thyroid normal to palpation     RESP: lungs clear to auscultation - no rales, rhonchi or wheezes     CV: regular rate and rhythm, normal S1 S2, no S3 or S4 and no murmur, click or rub     ABDOMEN:  soft, nontender, no HSM or masses and bowel sounds normal     MS: extremities normal- no gross deformities noted, no evidence " of inflammation in joints, FROM in all extremities.     SKIN: no suspicious lesions or rashes     NEURO: Normal strength and tone, sensory exam grossly normal, mentation intact and speech normal. Gait is stable.      PSYCH: mentation appears normal. and affect normal/bright     LYMPHATICS: No cervical adenopathy    Recent Labs   Lab Test 02/28/23  0830 01/27/23  1213   HGB 15.5 16.5   PLT  --  243    136   POTASSIUM 3.8 4.2   CR 0.80 0.88   A1C  --  5.4        Diagnostics:  No labs were ordered during this visit.   No EKG required, no history of coronary heart disease, significant arrhythmia, peripheral arterial disease or other structural heart disease.    Revised Cardiac Risk Index (RCRI):  The patient has the following serious cardiovascular risks for perioperative complications:   - No serious cardiac risks = 0 points     RCRI Interpretation: 0 points: Class I (very low risk - 0.4% complication rate)         Signed Electronically by: Joe Licea PA-C  Copy of this evaluation report is provided to requesting physician.

## 2023-09-25 NOTE — PATIENT INSTRUCTIONS
Consider chlorthalidone for your blood pressure.   Monitor at home.     Preparing for Your Surgery  Getting started  A nurse will call you to review your health history and instructions. They will give you an arrival time based on your scheduled surgery time. Please be ready to share:  Your doctor's clinic name and phone number  Your medical, surgical, and anesthesia history  A list of allergies and sensitivities  A list of medicines, including herbal treatments and over-the-counter drugs  Whether the patient has a legal guardian (ask how to send us the papers in advance)  Please tell us if you're pregnant--or if there's any chance you might be pregnant. Some surgeries may injure a fetus (unborn baby), so they require a pregnancy test. Surgeries that are safe for a fetus don't always need a test, and you can choose whether to have one.   If you have a child who's having surgery, please ask for a copy of Preparing for Your Child's Surgery.    Preparing for surgery  Within 10 to 30 days of surgery: Have a pre-op exam (sometimes called an H&P, or History and Physical). This can be done at a clinic or pre-operative center.  If you're having a , you may not need this exam. Talk to your care team.  At your pre-op exam, talk to your care team about all medicines you take. If you need to stop any medicines before surgery, ask when to start taking them again.  We do this for your safety. Many medicines can make you bleed too much during surgery. Some change how well surgery (anesthesia) drugs work.  Call your insurance company to let them know you're having surgery. (If you don't have insurance, call [phone].)  Call your clinic if there's any change in your health. This includes signs of a cold or flu (sore throat, runny nose, cough, rash, fever). It also includes a scrape or scratch near the surgery site.  If you have questions on the day of surgery, call your hospital or surgery center.  Eating and drinking  guidelines  For your safety: Unless your surgeon tells you otherwise, follow the guidelines below.  Eat and drink as usual until 8 hours before you arrive for surgery. After that, no food or milk.  Drink clear liquids until 2 hours before you arrive. These are liquids you can see through, like water, Gatorade, and Propel Water. They also include plain black coffee and tea (no cream or milk), candy, and breath mints. You can spit out gum when you arrive.  If you drink alcohol: Stop drinking it the night before surgery.  If your care team tells you to take medicine on the morning of surgery, it's okay to take it with a sip of water.  Preventing infection  Shower or bathe the night before and morning of your surgery. Follow the instructions your clinic gave you. (If no instructions, use regular soap.)  Don't shave or clip hair near your surgery site. We'll remove the hair if needed.  Don't smoke or vape the morning of surgery. You may chew nicotine gum up to 2 hours before surgery. A nicotine patch is okay.  Note: Some surgeries require you to completely quit smoking and nicotine. Check with your surgeon.  Your care team will make every effort to keep you safe from infection. We will:  Clean our hands often with soap and water (or an alcohol-based hand rub).  Clean the skin at your surgery site with a special soap that kills germs.  Give you a special gown to keep you warm. (Cold raises the risk of infection.)  Wear special hair covers, masks, gowns and gloves during surgery.  Give antibiotic medicine, if prescribed. Not all surgeries need antibiotics.  What to bring on the day of surgery  Photo ID and insurance card  Copy of your health care directive, if you have one  Glasses and hearing aids (bring cases)  You can't wear contacts during surgery  Inhaler and eye drops, if you use them (tell us about these when you arrive)  CPAP machine or breathing device, if you use them  A few personal items, if spending the  night  If you have . . .  A pacemaker, ICD (cardiac defibrillator) or other implant: Bring the ID card.  An implanted stimulator: Bring the remote control.  A legal guardian: Bring a copy of the certified (court-stamped) guardianship papers.  Please remove any jewelry, including body piercings. Leave jewelry and other valuables at home.  If you're going home the day of surgery  You must have a responsible adult drive you home. They should stay with you overnight as well.  If you don't have someone to stay with you, and you aren't safe to go home alone, we may keep you overnight. Insurance often won't pay for this.  After surgery  If it's hard to control your pain or you need more pain medicine, please call your surgeon's office.  Questions?   If you have any questions for your care team, list them here: _________________________________________________________________________________________________________________________________________________________________________ ____________________________________ ____________________________________ ____________________________________  For informational purposes only. Not to replace the advice of your health care provider. Copyright   2003, 2019 [employer]. All rights reserved. Clinically reviewed by Kellen Bartlett MD. [employer] [id number] - REV 12/22.    How to Take Your Medication Before Surgery  Hold aspirin until after surgery.

## 2023-09-25 NOTE — PROGRESS NOTES
[address]  [facility] [address]  Phone: [phone]  Primary Provider: Alistair Martinez  Pre-op Performing Provider: JET NICOLAS    PREOPERATIVE EVALUATION:  Today's date: 9/25/2023    Jeevan is a 62 year old male who presents for a preoperative evaluation.      9/25/2023     9:56 AM   Additional Questions   Roomed by Margaret   Accompanied by Self         9/25/2023     9:56 AM   Patient Reported Additional Medications   Patient reports taking the following new medications none       Surgical Information:  Surgery/Procedure: ARTHROPLASTY, RIGHT KNEE, TOTAL   Surgery Location: [facility]  Surgeon: Darian Davies MD   Surgery Date: 10/02/2023  Time of Surgery: 7:30 am  Where patient plans to recover: At home with family  Fax number for surgical facility: Note does not need to be faxed, will be available electronically in Epic.    Assessment & Plan     The proposed surgical procedure is considered INTERMEDIATE risk.      ICD-10-CM    1. Preop general physical exam  Z01.818       2. Primary osteoarthritis of right knee  M17.11       3. Hypertension goal BP (blood pressure) < 140/90  I10 amLODIPine (NORVASC) 10 MG tablet      4. NI (obstructive sleep apnea)  G47.33       5. Alcoholism /alcohol abuse  F10.20           Blood pressure elevated during both readings today but he did not take his amlodipine today. He also stopped his hydrochlorithiazide because he has heard about possible cancer side effects. I discussed trying chlorthalidone instead but he declines at this time. He will monitor home pressures closely and if consistently above 140/90, he will contact the clinic.    He drinks 4 beers nightly and I strongly recommend he cut back on this and ideally quit. He denies any hard alcohol use and states he has quit for 9 months previously without issues. He does not sound as interested in quitting at this time.  LFTs normal in January.      - No identified additional risk factors other than previously addressed    Antiplatelet or Anticoagulation Medication Instructions:   - aspirin: Discontinue aspirin 7-10 days prior to procedure to reduce bleeding risk. It should be resumed postoperatively.     Additional Medication Instructions:  Patient is to take all scheduled medications on the day of surgery    RECOMMENDATION:  APPROVAL GIVEN to proceed with proposed procedure, without further diagnostic evaluation.    Subjective       HPI related to upcoming procedure: He has worsening right knee osteoarthritis so will be undergoing a total knee arthroplasty as noted above.         9/25/2023     7:40 AM   Preop Questions   1. Have you ever had a heart attack or stroke? No   2. Have you ever had surgery on your heart or blood vessels, such as a stent placement, a coronary artery bypass, or surgery on an artery in your head, neck, heart, or legs? No   3. Do you have chest pain with activity? No   4. Do you have a history of  heart failure? No   5. Do you currently have a cold, bronchitis or symptoms of other infection? No   6. Do you have a cough, shortness of breath, or wheezing? No   7. Do you or anyone in your family have previous history of blood clots? No   8. Do you or does anyone in your family have a serious bleeding problem such as prolonged bleeding following surgeries or cuts? No   9. Have you ever had problems with anemia or been told to take iron pills? No   10. Have you had any abnormal blood loss such as black, tarry or bloody stools? No   11. Have you ever had a blood transfusion? No   12. Are you willing to have a blood transfusion if it is medically needed before, during, or after your surgery? Yes   13. Have you or any of your relatives ever had problems with anesthesia? YES - took awhile for anaesthesia effects to wear off in March 2023    14. Do you have sleep apnea, excessive snoring or daytime drowsiness? YES -  CPAP   14a. Do you have a CPAP machine? Yes   15. Do you have any artifical heart valves or other implanted medical devices like a pacemaker, defibrillator, or continuous glucose monitor? No   16. Do you have artificial joints? No   17. Are you allergic to latex? No       Health Care Directive:  Patient has a Health Care Directive on file    Status of Chronic Conditions:  See problem list for active medical problems.  Problems all longstanding and stable, except as noted/documented.  See ROS for pertinent symptoms related to these conditions.    Review of Systems  CONSTITUTIONAL: NEGATIVE for fever, chills, change in weight  INTEGUMENTARY/SKIN: NEGATIVE for worrisome rashes, moles or lesions  EYES: NEGATIVE for vision changes or irritation  ENT/MOUTH: NEGATIVE for ear, mouth and throat problems  RESP: NEGATIVE for significant cough or SOB  CV: NEGATIVE for chest pain, palpitations or peripheral edema  GI: NEGATIVE for nausea, abdominal pain, heartburn, or change in bowel habits  : NEGATIVE for frequency, dysuria, or hematuria  MUSCULOSKELETAL: +Right knee pain. NEGATIVE for myalgia  NEURO: NEGATIVE for weakness, dizziness or paresthesias  ENDOCRINE: NEGATIVE for temperature intolerance, skin/hair changes  HEME: NEGATIVE for bleeding problems  PSYCHIATRIC: NEGATIVE for changes in mood or affect    Patient Active Problem List    Diagnosis Date Noted    Numbness and tingling in right hand 08/30/2023     Priority: Medium    Meningioma (H) 01/27/2023     Priority: Medium    Morbid obesity (H) 07/29/2017     Priority: Medium    Alcoholic peripheral neuropathy (H) 07/29/2017     Priority: Medium    Alcoholism /alcohol abuse 07/29/2017     Priority: Medium     Replacing diagnoses that were inactivated after the 10/1/2021 regulatory import.      NI (obstructive sleep apnea)      Priority: Medium     moderate - Using CPAP consistently for the past 2 yrs      Anxiety 07/21/2014     Priority: Medium    ED (erectile  dysfunction) 02/07/2014     Priority: Medium    Deviated septum 09/17/2013     Priority: Medium     See CT scan      Chronic rhinitis 09/09/2013     Priority: Medium    GERD (gastroesophageal reflux disease) 09/09/2013     Priority: Medium    Adenomatous polyp of colon 09/01/2013     Priority: Medium     q 5 yr colonoscopy      Hyperlipidemia LDL goal <130 05/10/2011     Priority: Medium    Hypertension goal BP (blood pressure) < 140/90 12/20/2010     Priority: Medium      Past Medical History:   Diagnosis Date    Adenomatous polyp of colon 09/2013    q 5 yr colonoscopy    Bell's palsy 01/17/2007    Deviated septum 09/17/2013    See CT scan    Hypertension     LVH (left ventricular hypertrophy) 06/07/2011    see stress echo    NI (obstructive sleep apnea) 2016    moderate - dental appliance    Sensory polyneuropathy 2014    feet - see neuro consult    VENTRAL HERNIA NEC 06/19/2007     Past Surgical History:   Procedure Laterality Date    ABDOMEN SURGERY  March 2023    hernia    COLONOSCOPY  09/30/2013    Procedure: COMBINED COLONOSCOPY, SINGLE BIOPSY/POLYPECTOMY BY BIOPSY;  colonoscopy, polypectomy by biopsy;  Surgeon: Pedro Chris MD;  [facility]    COLONOSCOPY N/A 08/07/2017    Procedure: COLONOSCOPY;  colonoscopy;  Surgeon: Kiel Solomon MD;  [facility]    COLONOSCOPY N/A 06/19/2023    Procedure: COLONOSCOPY, WITH POLYPECTOMY AND BIOPSY;  Surgeon: Clif Lua DO;  Location:  GI    DAVINCI XI HERNIORRHAPHY VENTRAL N/A 03/10/2023    Procedure: Robot-assisted laparoscopic ventral hernia repair with mesh;  Surgeon: Clif Lua DO;  [facility]    EXCISE MASS HEAD  11/18/2013    Procedure: EXCISE MASS HEAD;  Excision of left forehead mass;  Surgeon: Pelon Echavarria MD;  [facility]    LASER YAG CAPSULOTOMY Left 06/02/2016    Procedure: LASER YAG CAPSULOTOMY;  Surgeon: Joss Raza MD;  [facility]    PHACOEMULSIFICATION WITH STANDARD  "INTRAOCULAR LENS IMPLANT Left 11/05/2015    Procedure: PHACOEMULSIFICATION WITH STANDARD INTRAOCULAR LENS IMPLANT;  Surgeon: Joss Raza MD;  [facility]    ZZHC REPAIR UMBILICAL YVONNE,5+Y/O, INCARCERATED OR STRANGULATED  06/09/2004     Current Outpatient Medications   Medication Sig Dispense Refill    amLODIPine (NORVASC) 10 MG tablet Take 1 tablet (10 mg) by mouth daily 90 tablet 3    cyanocobalamin (VITAMIN B-12) 1000 MCG tablet Take by mouth daily      omeprazole 20 MG tablet Take 1 tablet (20 mg) by mouth daily 90 tablet 4    Vitamin A 7.5 MG (88956 UT) CAPS       vitamin D3 (CHOLECALCIFEROL) 250 mcg (42079 units) capsule Take 1 capsule by mouth daily      aspirin (ASA) 81 MG tablet Take 1 tablet by mouth daily. (Patient not taking: Reported on 9/25/2023)      NONFORMULARY  (Patient not taking: Reported on 9/25/2023)         Allergies   Allergen Reactions    Lisinopril Other (See Comments)     Dry hacky cough        Social History     Tobacco Use    Smoking status: Never    Smokeless tobacco: Never   Substance Use Topics    Alcohol use: Yes     Comment: beer only, and occionally     History   Drug Use No         Objective     BP (!) 166/82 (BP Location: Right arm, Patient Position: Sitting, Cuff Size: Adult Large)   Pulse 74   Temp 98.3  F (36.8  C) (Temporal)   Resp 18   Ht 1.753 m (5' 9\")   Wt 121.1 kg (267 lb)   SpO2 98%   BMI 39.43 kg/m      Physical Exam    GENERAL APPEARANCE: healthy, alert and no distress     EYES: EOMI,  PERRL     HENT: ear canals and TM's normal and nose and mouth without ulcers or lesions     NECK: no adenopathy, no asymmetry, masses, or scars and thyroid normal to palpation     RESP: lungs clear to auscultation - no rales, rhonchi or wheezes     CV: regular rate and rhythm, normal S1 S2, no S3 or S4 and no murmur, click or rub     ABDOMEN:  soft, nontender, no HSM or masses and bowel sounds normal     MS: extremities normal- no gross deformities noted, no evidence " of inflammation in joints, FROM in all extremities.     SKIN: no suspicious lesions or rashes     NEURO: Normal strength and tone, sensory exam grossly normal, mentation intact and speech normal. Gait is stable.      PSYCH: mentation appears normal. and affect normal/bright     LYMPHATICS: No cervical adenopathy    Recent Labs   Lab Test 02/28/23  0830 01/27/23  1213   HGB 15.5 16.5   PLT  --  243    136   POTASSIUM 3.8 4.2   CR 0.80 0.88   A1C  --  5.4        Diagnostics:  No labs were ordered during this visit.   No EKG required, no history of coronary heart disease, significant arrhythmia, peripheral arterial disease or other structural heart disease.    Revised Cardiac Risk Index (RCRI):  The patient has the following serious cardiovascular risks for perioperative complications:   - No serious cardiac risks = 0 points     RCRI Interpretation: 0 points: Class I (very low risk - 0.4% complication rate)         Signed Electronically by: Joe Licea PA-C  Copy of this evaluation report is provided to requesting physician.

## 2023-09-26 DIAGNOSIS — Z96.651 STATUS POST TOTAL RIGHT KNEE REPLACEMENT: Primary | ICD-10-CM

## 2023-09-28 ENCOUNTER — MYC MEDICAL ADVICE (OUTPATIENT)
Dept: FAMILY MEDICINE | Facility: OTHER | Age: 63
End: 2023-09-28
Payer: COMMERCIAL

## 2023-09-28 NOTE — PROGRESS NOTES
DISCHARGE  Reason for Discharge: Patient chooses to discontinue therapy.    Equipment Issued: none    Discharge Plan: Patient to continue home program.    Referring Provider:  George Flores

## 2023-09-29 ENCOUNTER — ANESTHESIA EVENT (OUTPATIENT)
Dept: SURGERY | Facility: CLINIC | Age: 63
End: 2023-09-29
Payer: COMMERCIAL

## 2023-10-02 ENCOUNTER — APPOINTMENT (OUTPATIENT)
Dept: PHYSICAL THERAPY | Facility: CLINIC | Age: 63
End: 2023-10-02
Attending: ORTHOPAEDIC SURGERY
Payer: COMMERCIAL

## 2023-10-02 ENCOUNTER — HOSPITAL ENCOUNTER (OUTPATIENT)
Facility: CLINIC | Age: 63
Discharge: HOME HEALTH | End: 2023-10-04
Attending: ORTHOPAEDIC SURGERY | Admitting: ORTHOPAEDIC SURGERY
Payer: COMMERCIAL

## 2023-10-02 ENCOUNTER — APPOINTMENT (OUTPATIENT)
Dept: GENERAL RADIOLOGY | Facility: CLINIC | Age: 63
End: 2023-10-02
Payer: COMMERCIAL

## 2023-10-02 ENCOUNTER — ANESTHESIA (OUTPATIENT)
Dept: SURGERY | Facility: CLINIC | Age: 63
End: 2023-10-02
Payer: COMMERCIAL

## 2023-10-02 DIAGNOSIS — Z96.651 STATUS POST TOTAL RIGHT KNEE REPLACEMENT: ICD-10-CM

## 2023-10-02 DIAGNOSIS — M17.11 PRIMARY OSTEOARTHRITIS OF RIGHT KNEE: Primary | ICD-10-CM

## 2023-10-02 LAB — GLUCOSE BLDC GLUCOMTR-MCNC: 126 MG/DL (ref 70–99)

## 2023-10-02 PROCEDURE — 258N000001 HC RX 258: Performed by: ORTHOPAEDIC SURGERY

## 2023-10-02 PROCEDURE — 272N000001 HC OR GENERAL SUPPLY STERILE: Performed by: ORTHOPAEDIC SURGERY

## 2023-10-02 PROCEDURE — 250N000011 HC RX IP 250 OP 636: Mod: JZ

## 2023-10-02 PROCEDURE — 99305 1ST NF CARE MODERATE MDM 35: CPT | Performed by: INTERNAL MEDICINE

## 2023-10-02 PROCEDURE — 250N000009 HC RX 250: Performed by: ORTHOPAEDIC SURGERY

## 2023-10-02 PROCEDURE — 360N000077 HC SURGERY LEVEL 4, PER MIN: Performed by: ORTHOPAEDIC SURGERY

## 2023-10-02 PROCEDURE — 250N000009 HC RX 250: Performed by: NURSE ANESTHETIST, CERTIFIED REGISTERED

## 2023-10-02 PROCEDURE — 999N000065 XR KNEE PORT RIGHT 1/2 VIEWS: Mod: RT

## 2023-10-02 PROCEDURE — 97530 THERAPEUTIC ACTIVITIES: CPT | Mod: GP

## 2023-10-02 PROCEDURE — 999N000141 HC STATISTIC PRE-PROCEDURE NURSING ASSESSMENT: Performed by: ORTHOPAEDIC SURGERY

## 2023-10-02 PROCEDURE — C1776 JOINT DEVICE (IMPLANTABLE): HCPCS | Performed by: ORTHOPAEDIC SURGERY

## 2023-10-02 PROCEDURE — 258N000003 HC RX IP 258 OP 636: Performed by: NURSE ANESTHETIST, CERTIFIED REGISTERED

## 2023-10-02 PROCEDURE — 250N000013 HC RX MED GY IP 250 OP 250 PS 637

## 2023-10-02 PROCEDURE — 250N000011 HC RX IP 250 OP 636: Performed by: ANESTHESIOLOGY

## 2023-10-02 PROCEDURE — 250N000011 HC RX IP 250 OP 636: Mod: JZ | Performed by: ANESTHESIOLOGY

## 2023-10-02 PROCEDURE — 250N000011 HC RX IP 250 OP 636: Performed by: NURSE ANESTHETIST, CERTIFIED REGISTERED

## 2023-10-02 PROCEDURE — 250N000011 HC RX IP 250 OP 636: Mod: JZ | Performed by: ORTHOPAEDIC SURGERY

## 2023-10-02 PROCEDURE — 370N000017 HC ANESTHESIA TECHNICAL FEE, PER MIN: Performed by: ORTHOPAEDIC SURGERY

## 2023-10-02 PROCEDURE — 258N000003 HC RX IP 258 OP 636: Performed by: ORTHOPAEDIC SURGERY

## 2023-10-02 PROCEDURE — 97161 PT EVAL LOW COMPLEX 20 MIN: CPT | Mod: GP

## 2023-10-02 PROCEDURE — 250N000011 HC RX IP 250 OP 636

## 2023-10-02 PROCEDURE — C1713 ANCHOR/SCREW BN/BN,TIS/BN: HCPCS | Performed by: ORTHOPAEDIC SURGERY

## 2023-10-02 PROCEDURE — 250N000009 HC RX 250: Performed by: ANESTHESIOLOGY

## 2023-10-02 PROCEDURE — 97110 THERAPEUTIC EXERCISES: CPT | Mod: GP

## 2023-10-02 PROCEDURE — 710N000010 HC RECOVERY PHASE 1, LEVEL 2, PER MIN: Performed by: ORTHOPAEDIC SURGERY

## 2023-10-02 DEVICE — GENESIS II NON-POROUS C/R FEMORAL                                    SIZE 7 RIGHT
Type: IMPLANTABLE DEVICE | Site: KNEE | Status: FUNCTIONAL
Brand: GENESIS II

## 2023-10-02 DEVICE — BONE CEMENT SIMPLEX W/TOBRAMYCIN 6197-9-001: Type: IMPLANTABLE DEVICE | Site: KNEE | Status: FUNCTIONAL

## 2023-10-02 DEVICE — GENESIS II NON-POROUS TIBIAL                                    BASEPLATE SIZE 6 RIGHT
Type: IMPLANTABLE DEVICE | Site: KNEE | Status: FUNCTIONAL
Brand: GENESIS II

## 2023-10-02 DEVICE — IMP INSERT ARTICULAR S&N LGN CR XLPE SZ5-6 9MM 71453121: Type: IMPLANTABLE DEVICE | Site: KNEE | Status: FUNCTIONAL

## 2023-10-02 RX ORDER — BISACODYL 10 MG
10 SUPPOSITORY, RECTAL RECTAL DAILY PRN
Status: DISCONTINUED | OUTPATIENT
Start: 2023-10-02 | End: 2023-10-04 | Stop reason: HOSPADM

## 2023-10-02 RX ORDER — OXYCODONE HYDROCHLORIDE 5 MG/1
5 TABLET ORAL EVERY 4 HOURS PRN
Status: DISCONTINUED | OUTPATIENT
Start: 2023-10-02 | End: 2023-10-04 | Stop reason: HOSPADM

## 2023-10-02 RX ORDER — PANTOPRAZOLE SODIUM 40 MG/1
40 TABLET, DELAYED RELEASE ORAL
Status: DISCONTINUED | OUTPATIENT
Start: 2023-10-03 | End: 2023-10-04 | Stop reason: HOSPADM

## 2023-10-02 RX ORDER — FENTANYL CITRATE 50 UG/ML
25-50 INJECTION, SOLUTION INTRAMUSCULAR; INTRAVENOUS
Status: DISCONTINUED | OUTPATIENT
Start: 2023-10-02 | End: 2023-10-02

## 2023-10-02 RX ORDER — ACETAMINOPHEN 325 MG/1
650 TABLET ORAL EVERY 4 HOURS PRN
Status: DISCONTINUED | OUTPATIENT
Start: 2023-10-05 | End: 2023-10-04 | Stop reason: HOSPADM

## 2023-10-02 RX ORDER — POLYETHYLENE GLYCOL 3350 17 G/17G
17 POWDER, FOR SOLUTION ORAL DAILY
Status: DISCONTINUED | OUTPATIENT
Start: 2023-10-03 | End: 2023-10-04 | Stop reason: HOSPADM

## 2023-10-02 RX ORDER — OXYCODONE HYDROCHLORIDE 10 MG/1
10 TABLET ORAL
Status: DISCONTINUED | OUTPATIENT
Start: 2023-10-02 | End: 2023-10-02 | Stop reason: HOSPADM

## 2023-10-02 RX ORDER — OXYCODONE HYDROCHLORIDE 5 MG/1
5 TABLET ORAL
Status: DISCONTINUED | OUTPATIENT
Start: 2023-10-02 | End: 2023-10-02 | Stop reason: HOSPADM

## 2023-10-02 RX ORDER — HYDROMORPHONE HYDROCHLORIDE 1 MG/ML
0.2 INJECTION, SOLUTION INTRAMUSCULAR; INTRAVENOUS; SUBCUTANEOUS EVERY 5 MIN PRN
Status: DISCONTINUED | OUTPATIENT
Start: 2023-10-02 | End: 2023-10-02 | Stop reason: HOSPADM

## 2023-10-02 RX ORDER — FENTANYL CITRATE 50 UG/ML
25 INJECTION, SOLUTION INTRAMUSCULAR; INTRAVENOUS EVERY 5 MIN PRN
Status: DISCONTINUED | OUTPATIENT
Start: 2023-10-02 | End: 2023-10-02 | Stop reason: HOSPADM

## 2023-10-02 RX ORDER — LIDOCAINE 40 MG/G
CREAM TOPICAL
Status: DISCONTINUED | OUTPATIENT
Start: 2023-10-02 | End: 2023-10-04 | Stop reason: HOSPADM

## 2023-10-02 RX ORDER — BUPIVACAINE HYDROCHLORIDE 2.5 MG/ML
INJECTION, SOLUTION EPIDURAL; INFILTRATION; INTRACAUDAL
Status: COMPLETED | OUTPATIENT
Start: 2023-10-02 | End: 2023-10-02

## 2023-10-02 RX ORDER — ACETAMINOPHEN 325 MG/1
975 TABLET ORAL ONCE
Status: COMPLETED | OUTPATIENT
Start: 2023-10-02 | End: 2023-10-02

## 2023-10-02 RX ORDER — ONDANSETRON 2 MG/ML
INJECTION INTRAMUSCULAR; INTRAVENOUS PRN
Status: DISCONTINUED | OUTPATIENT
Start: 2023-10-02 | End: 2023-10-02

## 2023-10-02 RX ORDER — TRANEXAMIC ACID 650 MG/1
1950 TABLET ORAL ONCE
Status: COMPLETED | OUTPATIENT
Start: 2023-10-02 | End: 2023-10-02

## 2023-10-02 RX ORDER — ONDANSETRON 4 MG/1
4 TABLET, ORALLY DISINTEGRATING ORAL EVERY 30 MIN PRN
Status: DISCONTINUED | OUTPATIENT
Start: 2023-10-02 | End: 2023-10-02 | Stop reason: HOSPADM

## 2023-10-02 RX ORDER — LIDOCAINE HYDROCHLORIDE 20 MG/ML
INJECTION, SOLUTION INFILTRATION; PERINEURAL PRN
Status: DISCONTINUED | OUTPATIENT
Start: 2023-10-02 | End: 2023-10-02

## 2023-10-02 RX ORDER — CEFAZOLIN SODIUM/WATER 2 G/20 ML
2 SYRINGE (ML) INTRAVENOUS
Status: COMPLETED | OUTPATIENT
Start: 2023-10-02 | End: 2023-10-02

## 2023-10-02 RX ORDER — ASPIRIN 81 MG/1
81 TABLET ORAL 2 TIMES DAILY
Status: DISCONTINUED | OUTPATIENT
Start: 2023-10-02 | End: 2023-10-04 | Stop reason: HOSPADM

## 2023-10-02 RX ORDER — ONDANSETRON 2 MG/ML
4 INJECTION INTRAMUSCULAR; INTRAVENOUS EVERY 30 MIN PRN
Status: DISCONTINUED | OUTPATIENT
Start: 2023-10-02 | End: 2023-10-02 | Stop reason: HOSPADM

## 2023-10-02 RX ORDER — CEFAZOLIN SODIUM/WATER 2 G/20 ML
2 SYRINGE (ML) INTRAVENOUS SEE ADMIN INSTRUCTIONS
Status: DISCONTINUED | OUTPATIENT
Start: 2023-10-02 | End: 2023-10-02 | Stop reason: HOSPADM

## 2023-10-02 RX ORDER — NALOXONE HYDROCHLORIDE 0.4 MG/ML
0.2 INJECTION, SOLUTION INTRAMUSCULAR; INTRAVENOUS; SUBCUTANEOUS
Status: DISCONTINUED | OUTPATIENT
Start: 2023-10-02 | End: 2023-10-04 | Stop reason: HOSPADM

## 2023-10-02 RX ORDER — HYDROMORPHONE HYDROCHLORIDE 1 MG/ML
0.2 INJECTION, SOLUTION INTRAMUSCULAR; INTRAVENOUS; SUBCUTANEOUS
Status: DISCONTINUED | OUTPATIENT
Start: 2023-10-02 | End: 2023-10-04 | Stop reason: HOSPADM

## 2023-10-02 RX ORDER — PROPOFOL 10 MG/ML
INJECTION, EMULSION INTRAVENOUS CONTINUOUS PRN
Status: DISCONTINUED | OUTPATIENT
Start: 2023-10-02 | End: 2023-10-02

## 2023-10-02 RX ORDER — AMOXICILLIN 250 MG
1 CAPSULE ORAL 2 TIMES DAILY
Status: DISCONTINUED | OUTPATIENT
Start: 2023-10-02 | End: 2023-10-04 | Stop reason: HOSPADM

## 2023-10-02 RX ORDER — HYDROMORPHONE HYDROCHLORIDE 1 MG/ML
0.4 INJECTION, SOLUTION INTRAMUSCULAR; INTRAVENOUS; SUBCUTANEOUS
Status: DISCONTINUED | OUTPATIENT
Start: 2023-10-02 | End: 2023-10-04 | Stop reason: HOSPADM

## 2023-10-02 RX ORDER — NALOXONE HYDROCHLORIDE 0.4 MG/ML
0.4 INJECTION, SOLUTION INTRAMUSCULAR; INTRAVENOUS; SUBCUTANEOUS
Status: DISCONTINUED | OUTPATIENT
Start: 2023-10-02 | End: 2023-10-02 | Stop reason: HOSPADM

## 2023-10-02 RX ORDER — NALOXONE HYDROCHLORIDE 0.4 MG/ML
0.2 INJECTION, SOLUTION INTRAMUSCULAR; INTRAVENOUS; SUBCUTANEOUS
Status: DISCONTINUED | OUTPATIENT
Start: 2023-10-02 | End: 2023-10-02 | Stop reason: HOSPADM

## 2023-10-02 RX ORDER — SODIUM CHLORIDE, SODIUM LACTATE, POTASSIUM CHLORIDE, CALCIUM CHLORIDE 600; 310; 30; 20 MG/100ML; MG/100ML; MG/100ML; MG/100ML
INJECTION, SOLUTION INTRAVENOUS CONTINUOUS
Status: DISCONTINUED | OUTPATIENT
Start: 2023-10-02 | End: 2023-10-04 | Stop reason: HOSPADM

## 2023-10-02 RX ORDER — FLUMAZENIL 0.1 MG/ML
0.2 INJECTION, SOLUTION INTRAVENOUS
Status: DISCONTINUED | OUTPATIENT
Start: 2023-10-02 | End: 2023-10-02 | Stop reason: HOSPADM

## 2023-10-02 RX ORDER — HYDROMORPHONE HYDROCHLORIDE 1 MG/ML
0.4 INJECTION, SOLUTION INTRAMUSCULAR; INTRAVENOUS; SUBCUTANEOUS EVERY 5 MIN PRN
Status: DISCONTINUED | OUTPATIENT
Start: 2023-10-02 | End: 2023-10-02 | Stop reason: HOSPADM

## 2023-10-02 RX ORDER — SODIUM CHLORIDE, SODIUM LACTATE, POTASSIUM CHLORIDE, CALCIUM CHLORIDE 600; 310; 30; 20 MG/100ML; MG/100ML; MG/100ML; MG/100ML
INJECTION, SOLUTION INTRAVENOUS CONTINUOUS
Status: DISCONTINUED | OUTPATIENT
Start: 2023-10-02 | End: 2023-10-02 | Stop reason: HOSPADM

## 2023-10-02 RX ORDER — SODIUM CHLORIDE, SODIUM LACTATE, POTASSIUM CHLORIDE, CALCIUM CHLORIDE 600; 310; 30; 20 MG/100ML; MG/100ML; MG/100ML; MG/100ML
INJECTION, SOLUTION INTRAVENOUS CONTINUOUS PRN
Status: DISCONTINUED | OUTPATIENT
Start: 2023-10-02 | End: 2023-10-02

## 2023-10-02 RX ORDER — BUPIVACAINE HYDROCHLORIDE 7.5 MG/ML
INJECTION, SOLUTION INTRASPINAL
Status: COMPLETED | OUTPATIENT
Start: 2023-10-02 | End: 2023-10-02

## 2023-10-02 RX ORDER — PROCHLORPERAZINE MALEATE 10 MG
10 TABLET ORAL EVERY 6 HOURS PRN
Status: DISCONTINUED | OUTPATIENT
Start: 2023-10-02 | End: 2023-10-04 | Stop reason: HOSPADM

## 2023-10-02 RX ORDER — DEXAMETHASONE SODIUM PHOSPHATE 10 MG/ML
INJECTION, SOLUTION INTRAMUSCULAR; INTRAVENOUS
Status: COMPLETED | OUTPATIENT
Start: 2023-10-02 | End: 2023-10-02

## 2023-10-02 RX ORDER — OXYCODONE HYDROCHLORIDE 10 MG/1
10 TABLET ORAL EVERY 4 HOURS PRN
Status: DISCONTINUED | OUTPATIENT
Start: 2023-10-02 | End: 2023-10-04 | Stop reason: HOSPADM

## 2023-10-02 RX ORDER — ONDANSETRON 4 MG/1
4 TABLET, ORALLY DISINTEGRATING ORAL EVERY 6 HOURS PRN
Status: DISCONTINUED | OUTPATIENT
Start: 2023-10-02 | End: 2023-10-04 | Stop reason: HOSPADM

## 2023-10-02 RX ORDER — DEXMEDETOMIDINE HYDROCHLORIDE 4 UG/ML
INJECTION, SOLUTION INTRAVENOUS
Status: COMPLETED | OUTPATIENT
Start: 2023-10-02 | End: 2023-10-02

## 2023-10-02 RX ORDER — AMLODIPINE BESYLATE 10 MG/1
10 TABLET ORAL DAILY
Status: DISCONTINUED | OUTPATIENT
Start: 2023-10-03 | End: 2023-10-04 | Stop reason: HOSPADM

## 2023-10-02 RX ORDER — PROPOFOL 10 MG/ML
INJECTION, EMULSION INTRAVENOUS PRN
Status: DISCONTINUED | OUTPATIENT
Start: 2023-10-02 | End: 2023-10-02

## 2023-10-02 RX ORDER — ONDANSETRON 2 MG/ML
4 INJECTION INTRAMUSCULAR; INTRAVENOUS EVERY 6 HOURS PRN
Status: DISCONTINUED | OUTPATIENT
Start: 2023-10-02 | End: 2023-10-04 | Stop reason: HOSPADM

## 2023-10-02 RX ORDER — CEFAZOLIN SODIUM 2 G/100ML
2 INJECTION, SOLUTION INTRAVENOUS EVERY 8 HOURS
Status: COMPLETED | OUTPATIENT
Start: 2023-10-02 | End: 2023-10-03

## 2023-10-02 RX ORDER — NALOXONE HYDROCHLORIDE 0.4 MG/ML
0.4 INJECTION, SOLUTION INTRAMUSCULAR; INTRAVENOUS; SUBCUTANEOUS
Status: DISCONTINUED | OUTPATIENT
Start: 2023-10-02 | End: 2023-10-04 | Stop reason: HOSPADM

## 2023-10-02 RX ORDER — KETOROLAC TROMETHAMINE 30 MG/ML
15 INJECTION, SOLUTION INTRAMUSCULAR; INTRAVENOUS
Status: COMPLETED | OUTPATIENT
Start: 2023-10-02 | End: 2023-10-02

## 2023-10-02 RX ORDER — FENTANYL CITRATE 50 UG/ML
50 INJECTION, SOLUTION INTRAMUSCULAR; INTRAVENOUS EVERY 5 MIN PRN
Status: DISCONTINUED | OUTPATIENT
Start: 2023-10-02 | End: 2023-10-02 | Stop reason: HOSPADM

## 2023-10-02 RX ORDER — ACETAMINOPHEN 325 MG/1
975 TABLET ORAL EVERY 8 HOURS
Status: DISCONTINUED | OUTPATIENT
Start: 2023-10-02 | End: 2023-10-04 | Stop reason: HOSPADM

## 2023-10-02 RX ORDER — MAGNESIUM HYDROXIDE 1200 MG/15ML
LIQUID ORAL PRN
Status: DISCONTINUED | OUTPATIENT
Start: 2023-10-02 | End: 2023-10-02 | Stop reason: HOSPADM

## 2023-10-02 RX ADMIN — PROPOFOL 30 MG: 10 INJECTION, EMULSION INTRAVENOUS at 08:57

## 2023-10-02 RX ADMIN — PROPOFOL 30 MG: 10 INJECTION, EMULSION INTRAVENOUS at 07:49

## 2023-10-02 RX ADMIN — BUPIVACAINE HYDROCHLORIDE 20 ML: 2.5 INJECTION, SOLUTION EPIDURAL; INFILTRATION; INTRACAUDAL at 07:20

## 2023-10-02 RX ADMIN — PROPOFOL 30 MG: 10 INJECTION, EMULSION INTRAVENOUS at 09:55

## 2023-10-02 RX ADMIN — LIDOCAINE HYDROCHLORIDE 60 MG: 20 INJECTION, SOLUTION INFILTRATION; PERINEURAL at 07:43

## 2023-10-02 RX ADMIN — SODIUM CHLORIDE, POTASSIUM CHLORIDE, SODIUM LACTATE AND CALCIUM CHLORIDE: 600; 310; 30; 20 INJECTION, SOLUTION INTRAVENOUS at 07:43

## 2023-10-02 RX ADMIN — FENTANYL CITRATE 50 MCG: 50 INJECTION INTRAMUSCULAR; INTRAVENOUS at 07:21

## 2023-10-02 RX ADMIN — Medication 2 G: at 07:25

## 2023-10-02 RX ADMIN — CEFAZOLIN SODIUM 2 G: 2 INJECTION, SOLUTION INTRAVENOUS at 23:32

## 2023-10-02 RX ADMIN — HYDROMORPHONE HYDROCHLORIDE 0.4 MG: 1 INJECTION, SOLUTION INTRAMUSCULAR; INTRAVENOUS; SUBCUTANEOUS at 10:43

## 2023-10-02 RX ADMIN — OXYCODONE HYDROCHLORIDE 5 MG: 5 TABLET ORAL at 13:45

## 2023-10-02 RX ADMIN — BUPIVACAINE HYDROCHLORIDE IN DEXTROSE 1.6 ML: 7.5 INJECTION, SOLUTION SUBARACHNOID at 07:35

## 2023-10-02 RX ADMIN — PROPOFOL 100 MCG/KG/MIN: 10 INJECTION, EMULSION INTRAVENOUS at 07:45

## 2023-10-02 RX ADMIN — KETOROLAC TROMETHAMINE 15 MG: 30 INJECTION INTRAMUSCULAR; INTRAVENOUS at 10:56

## 2023-10-02 RX ADMIN — ASPIRIN 81 MG: 81 TABLET, COATED ORAL at 21:11

## 2023-10-02 RX ADMIN — OXYCODONE HYDROCHLORIDE 5 MG: 5 TABLET ORAL at 23:32

## 2023-10-02 RX ADMIN — ACETAMINOPHEN 975 MG: 325 TABLET, FILM COATED ORAL at 21:11

## 2023-10-02 RX ADMIN — DEXAMETHASONE SODIUM PHOSPHATE 1 MG: 10 INJECTION, SOLUTION INTRAMUSCULAR; INTRAVENOUS at 07:20

## 2023-10-02 RX ADMIN — Medication 20 MCG: at 07:20

## 2023-10-02 RX ADMIN — ACETAMINOPHEN 975 MG: 325 TABLET, FILM COATED ORAL at 06:33

## 2023-10-02 RX ADMIN — PROPOFOL 30 MG: 10 INJECTION, EMULSION INTRAVENOUS at 07:47

## 2023-10-02 RX ADMIN — ACETAMINOPHEN 975 MG: 325 TABLET, FILM COATED ORAL at 13:44

## 2023-10-02 RX ADMIN — SODIUM CHLORIDE, POTASSIUM CHLORIDE, SODIUM LACTATE AND CALCIUM CHLORIDE: 600; 310; 30; 20 INJECTION, SOLUTION INTRAVENOUS at 07:25

## 2023-10-02 RX ADMIN — TRANEXAMIC ACID 1950 MG: 650 TABLET ORAL at 06:33

## 2023-10-02 RX ADMIN — SENNOSIDES AND DOCUSATE SODIUM 1 TABLET: 50; 8.6 TABLET ORAL at 21:11

## 2023-10-02 RX ADMIN — ONDANSETRON 4 MG: 2 INJECTION INTRAMUSCULAR; INTRAVENOUS at 07:43

## 2023-10-02 RX ADMIN — PROPOFOL 100 MCG/KG/MIN: 10 INJECTION, EMULSION INTRAVENOUS at 08:47

## 2023-10-02 RX ADMIN — CEFAZOLIN SODIUM 2 G: 2 INJECTION, SOLUTION INTRAVENOUS at 16:35

## 2023-10-02 RX ADMIN — PROPOFOL 30 MG: 10 INJECTION, EMULSION INTRAVENOUS at 07:45

## 2023-10-02 ASSESSMENT — ACTIVITIES OF DAILY LIVING (ADL)
ADLS_ACUITY_SCORE: 20
ADLS_ACUITY_SCORE: 23
ADLS_ACUITY_SCORE: 20

## 2023-10-02 NOTE — ANESTHESIA PROCEDURE NOTES
Adductor canal Procedure Note    Pre-Procedure   Staff -        Anesthesiologist:  Marty Keller MD       Performed By: anesthesiologist       Location: pre-op       Procedure Start/Stop Times: 10/2/2023 7:20 AM and 10/2/2023 7:25 AM       Pre-Anesthestic Checklist: patient identified, IV checked, site marked, risks and benefits discussed, informed consent, monitors and equipment checked, pre-op evaluation, at physician/surgeon's request and post-op pain management  Timeout:       Correct Patient: Yes        Correct Procedure: Yes        Correct Site: Yes        Correct Position: Yes        Correct Laterality: Yes        Site Marked: Yes  Procedure Documentation  Procedure: Adductor canal       Diagnosis: ACUTE POSTOPERATIVE PAIN       Laterality: right       Patient Position: supine       Skin prep: Chloraprep       Needle Type: insulated       Needle Gauge: 22.        Needle Length (millimeters): 100        Ultrasound guided       1. Ultrasound was used to identify targeted nerve, plexus, vascular marker, or fascial plane and place a needle adjacent to it in real-time.       2. Ultrasound was used to visualize the spread of anesthetic in close proximity to the above referenced structure.       3. A permanent image is entered into the patient's record.       4. The visualized anatomic structures appeared normal.    Assessment/Narrative         The placement was negative for: blood aspirated, painful injection and site bleeding       Paresthesias: No.       Bolus given via needle..        Secured via.        Insertion/Infusion Method: Single Shot       Complications: none    Medication(s) Administered   Bupivacaine 0.25% PF (Infiltration) - Infiltration   20 mL - 10/2/2023 7:20:00 AM  Dexamethasone 10 mg/mL PF (Perineural) - Perineural   1 mg - 10/2/2023 7:20:00 AM  Dexmedetomidine 4 mcg/mL (Perineural) - Perineural   20 mcg - 10/2/2023 7:20:00 AM  Medication Administration Time: 10/2/2023 7:20 AM     Comments:   "Patient tolerated procedure well. No paresthesias, aspirated heme, or other complications noted. Needle visualized throughout without evidence of nerve/needle contact.        FOR [facility] ([facility] [place]) ONLY:   Pain Team Contact information: please page the Pain Team Via [employer]. Search \"Pain\". During daytime hours, please page the attending first. At night please page the resident first.      "

## 2023-10-02 NOTE — CONSULTS
"[facility]  Consult Note - Hospitalist Service, GOLD TEAM 21  Date of Admission:  10/2/2023  Consult Requested by: orthopedics  Reason for Consult: medical management     Assessment & Plan   Jeevan Lopez is a 62 year old male admitted on 10/2/2023. He presented secondary to primary osteoarthritis of right knee and is now status post total right knee arthroplasty.    Medications have been reconciled    Primary osteoarthritis of the right knee  Defer cares related to need to primary service  -PT/OT   -defer pain management to primary service     #Hypertension   #Dyslipidemia  - Not on statin per PTA meds  - Restart CCB  - PRN hydralazine for sbp greater than 170    #Obstructive sleep apnea  #Morbid obesity  -CPAP (home device)    #Alcohol use disorder  #Alcoholic induced peripheral neuropathy  Patient is in precontemplative phase per last family medicine note.    #GERD  We will convert home omeprazole to Protonix while inpatient.  Patient can discharge on home omeprazole    #Adenomatous polyp  Appears repeat colonoscopy was performed on 6/19/2023    #Meningioma- Followed by Dr. Dillard with neurosurgery. Follow up OP with primary neurosurgery team   NTD    #Hyperglycemia-no history of diabetes.  Hemoglobin A1c last checked at 5.4 on 1/27/2023       Clinically Significant Risk Factors Present on Admission                # Drug Induced Platelet Defect: home medication list includes an antiplatelet medication   # Hypertension: Noted on problem list      # Obesity: Estimated body mass index is 38.84 kg/m  as calculated from the following:    Height as of this encounter: 1.753 m (5' 9\").    Weight as of this encounter: 119.3 kg (263 lb 0.1 oz).              Bruce Krishna MD  Hospitalist Service, GOLD TEAM 21  Securely message with [employer] (more info)  Text page via [facility] Paging/Directory   See signed in provider for up to date coverage " information  ______________________________________________________________________    Chief Complaint   Pain in right leg    History is obtained from the patient    History of Present Illness   Jeevan Lopez is a 62 year old male who has a past medical history of primary osteoarthritis of right knee presented secondary to total right knee arthroplasty.    Patient has a known history of hypertension for which she takes amlodipine.  Patient's last amlodipine dose was this a.m.  Patient has been compliant with CPAP machine for over 2 years.  Patient has brought machine with him and would like to use it    Patient has known history of meningioma for which she follows with neurosurgery.  No intervention today.  Patient did have right shoulder surgery that is now resolved.    Patient is not currently taking lipid-lowering agents at this time          Past Medical History    Past Medical History:   Diagnosis Date    Adenomatous polyp of colon 09/2013    q 5 yr colonoscopy    Bell's palsy 01/17/2007    Deviated septum 09/17/2013    See CT scan    Hypertension     LVH (left ventricular hypertrophy) 06/07/2011    see stress echo    NI (obstructive sleep apnea) 2016    moderate - dental appliance    Osteoarthritis of right knee, unspecified osteoarthritis type 10/2/2023    Sensory polyneuropathy 2014    feet - see neuro consult    VENTRAL HERNIA NEC 06/19/2007       Past Surgical History   Past Surgical History:   Procedure Laterality Date    ABDOMEN SURGERY  March 2023    hernia    COLONOSCOPY  09/30/2013    Procedure: COMBINED COLONOSCOPY, SINGLE BIOPSY/POLYPECTOMY BY BIOPSY;  colonoscopy, polypectomy by biopsy;  Surgeon: Pedro Chris MD;  Location:  GI    COLONOSCOPY N/A 08/07/2017    Procedure: COLONOSCOPY;  colonoscopy;  Surgeon: Kiel Solomon MD;  Location:  GI    COLONOSCOPY N/A 06/19/2023    Procedure: COLONOSCOPY, WITH POLYPECTOMY AND BIOPSY;  Surgeon: Clif Lua DO;  Location:   GI    DAVINCI XI HERNIORRHAPHY VENTRAL N/A 03/10/2023    Procedure: Robot-assisted laparoscopic ventral hernia repair with mesh;  Surgeon: Clif Lua DO;  [facility]    EXCISE MASS HEAD  11/18/2013    Procedure: EXCISE MASS HEAD;  Excision of left forehead mass;  Surgeon: Pelon Echavarria MD;  [facility]    LASER YAG CAPSULOTOMY Left 06/02/2016    Procedure: LASER YAG CAPSULOTOMY;  Surgeon: Joss Raza MD;  [facility]    PHACOEMULSIFICATION WITH STANDARD INTRAOCULAR LENS IMPLANT Left 11/05/2015    Procedure: PHACOEMULSIFICATION WITH STANDARD INTRAOCULAR LENS IMPLANT;  Surgeon: Joss Raza MD;  [facility]    ZZHC REPAIR UMBILICAL YVONNE,5+Y/O, INCARCERATED OR STRANGULATED  06/09/2004       Medications   I have reviewed this patient's current medications       Review of Systems    The 10 point Review of Systems is negative other than noted in the HPI or here.      Physical Exam   Vital Signs: Temp: 98.7  F (37.1  C) Temp src: Oral BP: (!) 143/65 Pulse: 72   Resp: 18 SpO2: 97 % O2 Device: None (Room air) Oxygen Delivery: 6 LPM  Weight: 263 lbs .14 oz  Physical Exam  Constitutional:       General: He is not in acute distress.     Appearance: Normal appearance. He is not ill-appearing or toxic-appearing.   HENT:      Head: Normocephalic.      Right Ear: Tympanic membrane normal.      Mouth/Throat:      Mouth: Mucous membranes are moist.   Cardiovascular:      Rate and Rhythm: Normal rate and regular rhythm.      Heart sounds: No murmur heard.     No friction rub. No gallop.   Pulmonary:      Effort: Pulmonary effort is normal. No respiratory distress.      Breath sounds: No stridor. No wheezing.   Abdominal:      General: Abdomen is flat. There is no distension.      Palpations: Abdomen is soft. There is no mass.      Tenderness: There is no abdominal tenderness.   Musculoskeletal:      Comments: Right leg is bandaged from foot to thigh high   Neurological:      General:  No focal deficit present.      Mental Status: He is alert and oriented to person, place, and time.   Psychiatric:         Mood and Affect: Mood normal.         Behavior: Behavior normal.           Medical Decision Making       **CLEAR ALL SELECTIONS**      Data

## 2023-10-02 NOTE — PROGRESS NOTES
PACU to Inpatient Nursing Handoff    Patient Jeevan Lopez is a 62 year old male who speaks English.   Procedure Procedure(s):  ARTHROPLASTY,RIGHT  KNEE, TOTAL   Surgeon(s) Primary: Darian Davies MD  Assisting: Moraima Kramer PA-C  Resident - Assisting: Darian Carter MD     Allergies   Allergen Reactions    Lisinopril Other (See Comments)     Dry hacky cough       Isolation  No active isolations     Past Medical History   has a past medical history of Adenomatous polyp of colon (09/2013), Bell's palsy (01/17/2007), Deviated septum (09/17/2013), Hypertension, LVH (left ventricular hypertrophy) (06/07/2011), NI (obstructive sleep apnea) (2016), Osteoarthritis of right knee, unspecified osteoarthritis type (10/2/2023), Sensory polyneuropathy (2014), and VENTRAL HERNIA NEC (06/19/2007).    Anesthesia Spinal with Block   Dermatome Level     Preop Meds acetaminophen (Tylenol) - time given: 0633  TXA - time given: 0633   Nerve block Adductor canal.  Location:right. Med:Exparel (liposomal bupivacaine). Time given: 0735   Intraop Meds ondansetron (Zofran): last given at 0745   Local Meds Yes   Antibiotics cefazolin (Ancef) - last given at 0725     Pain Patient Currently in Pain: yes   PACU meds  hydromorphone (Dilaudid): 0.4 mg (total dose) last given at 1043   ketorolac (Toradol): 15 mg last given at 1057   PCA / epidural No   Capnography     Telemetry ECG Rhythm: Sinus rhythm   Inpatient Telemetry Monitor Ordered? No        Labs Glucose Lab Results   Component Value Date     10/02/2023     04/20/2021       Hgb Lab Results   Component Value Date    HGB 15.5 02/28/2023    HGB 15.6 11/28/2017       INR No results found for: INR   PACU Imaging Completed     Wound/Incision Wound 04/10/13 Right Finger (Comment which one) Laceration jagged edged laceration on right thumb from table saw. (Active)   Number of days: 3827       Incision/Surgical Site 11/18/13 Left Forehead (Active)   Number of days: 3605        Incision/Surgical Site 11/05/15 Left Eye (Active)   Number of days: 2888       Incision/Surgical Site 03/10/23 Abdomen (Active)   Number of days: 206       Incision/Surgical Site 10/02/23 Right Knee (Active)   Incision Assessment UTV 10/02/23 1113   Closure BIBIANA 10/02/23 1113   Incision Drainage Amount None 10/02/23 1113   Dressing Intervention Clean, dry, intact 10/02/23 1113   Number of days: 0      CMS        Equipment ice pack   Other LDA       IV Access Peripheral IV 10/02/23 Left Hand (Active)   Site Assessment WDL 10/02/23 1100   Line Status Infusing 10/02/23 1100   Dressing Transparent 10/02/23 1028   Dressing Status clean;dry;intact 10/02/23 1028   Dressing Intervention New dressing  10/02/23 0708   Line Intervention Flushed 10/02/23 0708   Phlebitis Scale 0-->no symptoms 10/02/23 1100   Number of days: 0      Blood Products Not applicable EBL See notes mL   Intake/Output Date 10/02/23 0700 - 10/03/23 0659   Shift 9267-6980 7314-1257 4227-1277 24 Hour Total   INTAKE   P.O. 120   120   I.V. 900   900   Shift Total(mL/kg) 1020(8.55)   1020(8.55)   OUTPUT   Blood 25   25   Shift Total(mL/kg) 25(0.21)   25(0.21)   Weight (kg) 119.3 119.3 119.3 119.3      Drains / Otoole     Time of void PreOp Time of Void Prior to Procedure: 0640 (10/02/23 0639)    PostOp      Diapered? No   Bladder Scan Bladder Scan Volume (mL): 233 ml (10/02/23 1100)    mL (10/02/23 1100)  crackers and water     Vitals    B/P: 136/67  T: 97.7  F (36.5  C)    Temp src: Axillary  P:  Pulse: 71 (10/02/23 1100)          R: 17  O2:  SpO2: 96 %    O2 Device: None (Room air) (10/02/23 1045)    Oxygen Delivery: 6 LPM (10/02/23 1018)         Family/support present Daughter Roseanne   Patient belongings     Patient transported on bed   DC meds/scripts (obs/outpt) Not applicable   Inpatient Pain Meds Released? Yes       Special needs/considerations Had severe constipation after previous surgery, would like prevention measures this time   Tasks  needing completion None       ASIYA GODWIN, RN  [facility] [id number]

## 2023-10-02 NOTE — BRIEF OP NOTE
Orthopaedic Surgery Brief Op-Note      Patient: Jeevan Lopez  : 1960  Date of Service: 10/2/2023 10:14 AM    Pre-operative Diagnosis: Primary osteoarthritis of right knee [M17.11]  Post-operative Diagnosis: same    Procedure(s) Performed: Procedure(s):  ARTHROPLASTY,RIGHT  KNEE, TOTAL    Staff: Dr. Davies  Assistants:   DAX SWIFT PA-C    Anesthesia: Spinal  EBL: 25 cc  Tourniquet Time: 76 minutes at 250 mmHg    Implants:   Implant Name Type Inv. Item Serial No.  Lot No. LRB No. Used Action   BONE CEMENT SIMPLEX W/TOBRAMYCIN 6197-9-001 - [id number] Cement, Bone BONE CEMENT SIMPLEX W/TOBRAMYCIN 6197-9-001  [employer] [id number] Right 2 Implanted   IMP COMP FEMORAL SNN GEN II CR SZ 7 RT [id number] - [id number] Total Joint Component/Insert IMP COMP FEMORAL SNN GEN II CR SZ 7 RT [id number]  [employer] [id number] Right 1 Implanted   IMP BASEPLATE TIBIAL SEBASTIAN II SZ 6 RT TI [id number] - [id number] Total Joint Component/Insert IMP BASEPLATE TIBIAL SEBASTIAN II SZ 6 RT TI [id number]  [employer] [id number] Right 1 Implanted   IMP INSERT ARTICULAR S&N LGN CR XLPE SZ5-6 9MM [id number] - [id number] Total Joint Component/Insert IMP INSERT ARTICULAR S&N LGN CR XLPE SZ5-6 9MM [id number]  [employer] [id number] Right 1 Implanted     Drains: none  Intra-op Labs/Cxs/Specimens: None  Complications: No apparent complications during procedure  Findings: Please see dictated operative note for details    Disposition: Stable to PACU, then admit to Orthopaedics.    Post-Op Plan:  Assessment/Plan: Jeevan Lopez is a 62 year old male s/p Procedure(s):  ARTHROPLASTY,RIGHT  KNEE, TOTAL on 10/2/2023 with Dr. Davies.    Activity: Up with assist and assistive devices as needed until independent. Knee ROM as tolerated.   Weight bearing status: WBAT    Antibiotics: Cefazolin x 24 hours  Diet: Begin with clear fluids and progress diet as tolerated. Bowel regimen. Anti-emetics PRN.    DVT prophylaxis:  ASA  81 mg BID x 4 weeks   Elevation: Elevate heels off of bed on pillows, no pillows behind the knee at any time    Wound Care: Tegaderm and alginate x 2 weeks   Pain management: Orals PRN, IV for breakthrough only  X-rays: AP/Lat operative knee XR in PACU.  Physical Therapy: Mobilization, ROM, ADL's  Occupational Therapy: ADL's  Labs: Trend Hgb on PODs #1 & 2  Cultures: None  Consults: PT, OT. Hospitalist, appreciate assistance in caring for this patient throughout the perioperative period     Future Appointments   Date Time Provider Department Center   10/2/2023  3:30 PM Pati Wilhelm, PT [facility] [place]   10/3/2023  9:00 AM Tatum Ramsey, OT [facility] [place]   10/3/2023 10:00 AM Moraima Ashley, PT [facility] [place]   10/3/2023  1:00 PM Moraima Ashley, PT [facility] [place]   10/5/2023  1:00 PM Marilee Vital, PT [facility]   10/9/2023 10:15 AM Farrah Montano, PT [facility]   10/16/2023 10:15 AM Farrah Montano, PT [facility]   10/17/2023 10:00 AM MG NURSE ONLY [facility]   11/14/2023 10:00 AM Darian Davies MD [facility] [place]       Disposition: Pending progress with therapies, pain control on orals, and medical stability, anticipate discharge to Home vs TCU on POD #1-2.    I assisted with positioning, prepping and draping, and closure.      Moraima Kramer PA-C 10/2/2023 10:14 AM  Orthopaedic Surgery     Please page me at [phone] with any questions/concerns during regular weekday hours before 5pm. If there is no response, if it is a weekend, or if it is during evening hours then please page the orthopaedic surgery resident on call.

## 2023-10-02 NOTE — ANESTHESIA PROCEDURE NOTES
"Intrathecal injection Procedure Note    Pre-Procedure   Staff -        Anesthesiologist:  Gerardo Magallanes MD       Performed By: anesthesiologist       Location: OR       Procedure Start/Stop Times: 10/2/2023 7:35 AM and 10/2/2023 7:42 AM       Pre-Anesthestic Checklist: patient identified, IV checked, risks and benefits discussed, informed consent, monitors and equipment checked, pre-op evaluation, at physician/surgeon's request and post-op pain management  Timeout:       Correct Patient: Yes        Correct Procedure: Yes        Correct Site: Yes        Correct Position: Yes   Procedure Documentation  Procedure: intrathecal injection       Patient Position: sitting       Patient Prep/Sterile Barriers: sterile gloves, mask, patient draped       Skin prep: Chloraprep       Insertion Site: L3-4. (midline approach).       Needle Gauge: 25.        Needle Length (Inches): 3.5        Spinal Needle Type: Elmer tip       Introducer used       # of attempts: 1 and  # of redirects:  0    Assessment/Narrative         Paresthesias: No.       CSF fluid: clear.    Medication(s) Administered   0.75% Hyperbaric Bupivacaine (Intrathecal) - Intrathecal   1.6 mL - 10/2/2023 7:35:00 AM  Medication Administration Time: 10/2/2023 7:35 AM      FOR [facility] ([facility]) ONLY:   Pain Team Contact information: please page the Pain Team Via [employer]. Search \"Pain\". During daytime hours, please page the attending first. At night please page the resident first.      "

## 2023-10-02 NOTE — ANESTHESIA PREPROCEDURE EVALUATION
Anesthesia Pre-Procedure Evaluation    Patient: Jeevan Lopez   MRN: [id number] : 1960        Procedure : Procedure(s):  ARTHROPLASTY,RIGHT  KNEE, TOTAL          Past Medical History:   Diagnosis Date    Adenomatous polyp of colon 2013    q 5 yr colonoscopy    Bell's palsy 2007    Deviated septum 2013    See CT scan    Hypertension     LVH (left ventricular hypertrophy) 2011    see stress echo    NI (obstructive sleep apnea)     moderate - dental appliance    Sensory polyneuropathy     feet - see neuro consult    VENTRAL HERNIA NEC 2007      Past Surgical History:   Procedure Laterality Date    ABDOMEN SURGERY  2023    hernia    COLONOSCOPY  2013    Procedure: COMBINED COLONOSCOPY, SINGLE BIOPSY/POLYPECTOMY BY BIOPSY;  colonoscopy, polypectomy by biopsy;  Surgeon: Pedro Chris MD;  Location: [facility]    COLONOSCOPY N/A 2017    Procedure: COLONOSCOPY;  colonoscopy;  Surgeon: Kiel Solomon MD;  Location: [facility]    COLONOSCOPY N/A 2023    Procedure: COLONOSCOPY, WITH POLYPECTOMY AND BIOPSY;  Surgeon: Clif Lua DO;  Location:  GI    DAVINCI XI HERNIORRHAPHY VENTRAL N/A 03/10/2023    Procedure: Robot-assisted laparoscopic ventral hernia repair with mesh;  Surgeon: Clif Lua DO;  Location: [facility]    EXCISE MASS HEAD  2013    Procedure: EXCISE MASS HEAD;  Excision of left forehead mass;  Surgeon: Pelon Echavarria MD;  Location: [facility]    LASER YAG CAPSULOTOMY Left 2016    Procedure: LASER YAG CAPSULOTOMY;  Surgeon: Joss Raza MD;  Location: [facility]    PHACOEMULSIFICATION WITH STANDARD INTRAOCULAR LENS IMPLANT Left 2015    Procedure: PHACOEMULSIFICATION WITH STANDARD INTRAOCULAR LENS IMPLANT;  Surgeon: Joss Raza MD;  Location: [facility]    ZZHC REPAIR UMBILICAL YVONNE,5+Y/O, INCARCERATED OR STRANGULATED  2004      Allergies   Allergen Reactions    Lisinopril Other (See  Comments)     Dry hacky cough      Social History     Tobacco Use    Smoking status: Never    Smokeless tobacco: Never   Substance Use Topics    Alcohol use: Yes     Comment: beer only, and occionally      Wt Readings from Last 1 Encounters:   10/02/23 119.3 kg (263 lb 0.1 oz)        Anesthesia Evaluation   Pt has had prior anesthetic.     No history of anesthetic complications       ROS/MED HX  ENT/Pulmonary:     (+) sleep apnea, uses CPAP,                                     Neurologic:  - neg neurologic ROS     Cardiovascular:     (+)  hypertension- -   -  - -                                      METS/Exercise Tolerance:     Hematologic:       Musculoskeletal:       GI/Hepatic:     (+) GERD, Asymptomatic on medication,                  Renal/Genitourinary:       Endo:     (+)               Obesity,       Psychiatric/Substance Use:     (+)  1       Infectious Disease:       Malignancy:       Other:            Physical Exam    Airway  airway exam normal           Respiratory Devices and Support         Dental       (+) Minor Abnormalities - some fillings, tiny chips      Cardiovascular   cardiovascular exam normal          Pulmonary   pulmonary exam normal                OUTSIDE LABS:  CBC:   Lab Results   Component Value Date    WBC 10.5 01/27/2023    WBC 7.2 09/23/2021    HGB 15.5 02/28/2023    HGB 16.5 01/27/2023    HCT 46.4 02/28/2023    HCT 50.1 01/27/2023     01/27/2023     09/23/2021     BMP:   Lab Results   Component Value Date     02/28/2023     01/27/2023    POTASSIUM 3.8 02/28/2023    POTASSIUM 4.2 01/27/2023    CHLORIDE 98 02/28/2023    CHLORIDE 98 01/27/2023    CO2 27 02/28/2023    CO2 28 01/27/2023    BUN 9.3 02/28/2023    BUN 10.9 01/27/2023    CR 0.80 02/28/2023    CR 0.88 01/27/2023     (H) 10/02/2023     (H) 02/28/2023     COAGS: No results found for: PTT, INR, FIBR  POC: No results found for: BGM, HCG, HCGS  HEPATIC:   Lab Results   Component Value Date     ALBUMIN 4.4 01/27/2023    PROTTOTAL 7.2 01/27/2023    ALT 11 01/27/2023    AST 22 01/27/2023    ALKPHOS 83 01/27/2023    BILITOTAL 0.6 01/27/2023     OTHER:   Lab Results   Component Value Date    A1C 5.4 01/27/2023    HAYLEY 9.5 02/28/2023    TSH 1.43 01/27/2023    CRP 12.2 11/28/2017    SED 5 09/17/2014       Anesthesia Plan    ASA Status:  2    NPO Status:  NPO Appropriate    Anesthesia Type: Spinal.   Induction: Intravenous.   Maintenance: TIVA.        Consents    Anesthesia Plan(s) and associated risks, benefits, and realistic alternatives discussed. Questions answered and patient/representative(s) expressed understanding.     - Discussed: Risks, Benefits and Alternatives for BOTH SEDATION and the PROCEDURE were discussed     - Discussed with:  Patient            Postoperative Care    Pain management: Oral pain medications, Peripheral nerve block (Single Shot), Multi-modal analgesia.   PONV prophylaxis: Ondansetron (or other 5HT-3), Dexamethasone or Solumedrol     Comments:                Gerardo Magallanes MD, MD

## 2023-10-02 NOTE — OP NOTE
OPERATIVE REPORT    DATE OF SERVICE: 10/2/23    SURGEON: Darian Davies MD.    ASSISTANT(S):  Moraima Kramer PA-C. Darian Carter MD scrubbed for the final 30 minutes of the case     PREOPERATIVE DIAGNOSIS:  Osteoarthritis    POSTOPERATIVE DIAGNOSIS:  Osteoarthritis    OPERATION PERFORMED:  right  total knee arthroplasty    IMPLANTS:    Implant Name Type Inv. Item Serial No.  Lot No. LRB No. Used Action   BONE CEMENT SIMPLEX W/TOBRAMYCIN 6197-9-001 - [id number] Cement, Bone BONE CEMENT SIMPLEX W/TOBRAMYCIN 6197-9-001  [employer] [id number] Right 2 Implanted   IMP COMP FEMORAL SNN GEN II CR SZ 7 RT [id number] - [id number] Total Joint Component/Insert IMP COMP FEMORAL SNN GEN II CR SZ 7 RT [id number]  [employer] [id number] Right 1 Implanted   IMP BASEPLATE TIBIAL SEBASTIAN II SZ 6 RT TI [id number] - [id number] Total Joint Component/Insert IMP BASEPLATE TIBIAL SEBASTIAN II SZ 6 RT TI [id number]  [employer] [id number] Right 1 Implanted   IMP INSERT ARTICULAR S&N LGN CR XLPE SZ5-6 9MM [id number] - [id number] Total Joint Component/Insert IMP INSERT ARTICULAR S&N LGN CR XLPE SZ5-6 9MM [id number]  [employer] [id number] Right 1 Implanted       ANESTHETIC: spinal      OPERATIVE FINDINGS:  End stage arthrosis of the knee. Patella appropriate for patellar retention.     BLOOD LOSS: about 25 ml     TOURNIQUET TIME: 74 minutes     COMPLICATIONS:  None apparent    OPERATIVE INDICATIONS:  The patient has a long history of debilitating pain secondary to ostearthritis of the knee.  Despite comprehensive non-operative management these symptoms continued to interfere with activities of daily living.  After discussion of further treatment options including the risks and benefits that patient elected to proceed with a total knee.    DESCRIPTION OF THE PROCEDURE:  The patient was identified in the preoperative holding area.  The consent form including the risks and benefits were reviewed with the patient.   The operative limb was identified and marked.  The patient was brought back to the operating room and placed supine on the operating table.  An anesthetic was induced by the anesthesia team.   The patient was prepped and draped in the normal standard fashion for a knee replacement.  A time-out was called.  Antibiotics were given.The tourniquet was inflated.  We utilized an approximately 15 cm curvilinear incision, centered on the patella ridge, and performed a standard parapatellar approach to the knee. There was normal appearing joint fluid seen upon arthrotomy. A modest medial release was performed. The patella was everted. Medial and lateral retractors were placed. The knee was brought into flexion. The AP axis of the knee was marked and an intra-medullary femoral guide jose was placed as templated. The epicondylar axis was then marked. The anterior femoral cutting guide was placed and the epicondylar and AP axes were used to set the rotation of the jig. A stylus was then used to set the depth of the resection. Retractors were used to protect the skin and the anterior cut was made. The distal femoral cutting guide was then placed and pinned. The resection was set to remove the cartilage from the intra-condylar notch. The femoral sizing guide was then placed; the knee sized well to a 7. The four-in-one femoral cutting guide was then placed and pinned. The cuts were made. The trial component was well fit. Attention was then turned to the tibia. A PCL retractor was then placed and used to bring the tibia anterior. Medial and lateral retractors were placed. A tibial intra-medullary guide jose was placed. The tibial cutting guide was then assembled on the guide jose. The slope was set to nearly mirror the anatomic slope. The tibial cut was first checked with a two-and-nine guide and the cut was then made. A lamina  was then used to remove the necessary bone from the posterior condyles and then the remaining  meniscus was removed. The tibia and was sized and it sized well to a 6. The trial components were then assembled connor  9 mm trial poly was used for trialing. The knee came out into full extension and the knee had greater than 120 degrees of flexion. It was well balanced in the coronal plane with varus and valgus stress at 0 and 30 degrees of flexion. Happy with the reconstruction the tibial rotation was marked, the trial components were removed, and all cancellous surfaces were cleaned with a pulse lavage and then dried. The components were cemented into place, a trial poly placed, the knee brought into extension, and the cement was allowed to dry. With the cement dry the knee was lavaged. The tourniquet was let down and hemostasis was achieved. The knee was then brought into extension and the final poly was placed. It was seen to lock into place. The soft tissues were then injected with analgesic. The capsule was closed with interrupted Vicryl, the dermis with interrupted Vicryl, and skin with running monocryl, Dermabond and steri-strips.  At the end of the procedure the sponge and needle counts were correct times two.  The patient tolerated the procedure well and returned to the PAR extubated and stable.    POSTOPERATIVE PLAN:  1. Weight bearing as tolerated  2. DVT prophylaxis with ECASA 81 mg PO BID x 6 week s  4. 24 hours of prophylactic antibiotics  5. Follow-up:  Wound clinic in 2 weeks and with Samson in clinic in 6 weeks for x-rays and a rehabilitation check.

## 2023-10-02 NOTE — PHARMACY-ADMISSION MEDICATION HISTORY
Pharmacist Admission Medication History    Admission medication history is complete. The information provided in this note is only as accurate as the sources available at the time of the update.    Medication reconciliation/reorder completed by provider prior to medication history? Yes    Information Source(s): Patient via in-person    Pertinent Information: Patient wondered if he would be able to take vitamin b-12 supplement soon as it helps his neuropathy.    Changes made to PTA medication list:  Added: None  Deleted:   Non-formulary drug with no information listed - patient unsure of what this might be - could have been cocoa powder he used to take but no longer takes for BP due to it causing gout  Changed: None    Allergies reviewed with patient and updates made in EHR: yes    Medication History Completed By: Eric Srivastava RPH 10/2/2023 3:42 PM    Prior to Admission medications    Medication Sig Last Dose Taking? Auth Provider Long Term End Date   amLODIPine (NORVASC) 10 MG tablet Take 1 tablet (10 mg) by mouth daily 10/2/2023 at 0430 Yes oJe Licea PA-C Yes    aspirin (ASA) 81 MG tablet Take 1 tablet by mouth daily Past Week at AM Yes Reported, Patient     cyanocobalamin (VITAMIN B-12) 1000 MCG tablet Take by mouth daily Past Week at AM Yes Reported, Patient     omeprazole 20 MG tablet Take 1 tablet (20 mg) by mouth daily 10/2/2023 at 0430 Yes Alistair Martinez MD     Vitamin A 7.5 MG (97758 UT) CAPS Take 1 capsule by mouth daily 9/25/2023 at AM Yes Reported, Patient     vitamin D3 (CHOLECALCIFEROL) 250 mcg (59424 units) capsule Take 1 capsule by mouth daily 9/25/2023 at AM Yes Reported, Patient       Eric Srivastava, PharmD

## 2023-10-02 NOTE — ANESTHESIA POSTPROCEDURE EVALUATION
Patient: Jeevan Lopez    Procedure: Procedure(s):  ARTHROPLASTY,RIGHT  KNEE, TOTAL       Anesthesia Type:  Spinal    Note:  Disposition: Admission   Postop Pain Control: Uneventful            Sign Out: Well controlled pain   PONV: No   Neuro/Psych: Uneventful            Sign Out: Acceptable/Baseline neuro status   Airway/Respiratory: Uneventful            Sign Out: Acceptable/Baseline resp. status   CV/Hemodynamics: Uneventful            Sign Out: Acceptable CV status; No obvious hypovolemia; No obvious fluid overload   Other NRE: NONE   DID A NON-ROUTINE EVENT OCCUR? No           Last vitals:  Vitals Value Taken Time   /67 10/02/23 1100   Temp 36.5  C (97.7  F) 10/02/23 1100   Pulse 73 10/02/23 1111   Resp 17 10/02/23 1111   SpO2 96 % 10/02/23 1111   Vitals shown include unvalidated device data.    Electronically Signed By: Gerardo Magallanes MD, MD  October 2, 2023  11:12 AM

## 2023-10-02 NOTE — INTERVAL H&P NOTE
"I have reviewed the surgical (or preoperative) H&P that is linked to this encounter, and examined the patient. There are no significant changes    Clinical Conditions Present on Arrival:  Clinically Significant Risk Factors Present on Admission                 # Drug Induced Platelet Defect: home medication list includes an antiplatelet medication  # Obesity: Estimated body mass index is 38.84 kg/m  as calculated from the following:    Height as of this encounter: 1.753 m (5' 9\").    Weight as of this encounter: 119.3 kg (263 lb 0.1 oz).       "

## 2023-10-02 NOTE — ANESTHESIA CARE TRANSFER NOTE
Patient: Jeevan Lopez    Procedure: Procedure(s):  ARTHROPLASTY,RIGHT  KNEE, TOTAL       Diagnosis: Primary osteoarthritis of right knee [M17.11]  Diagnosis Additional Information: No value filed.    Anesthesia Type:   Spinal     Note:    Oropharynx: oropharynx clear of all foreign objects and spontaneously breathing  Level of Consciousness: awake and drowsy  Oxygen Supplementation: face mask  Level of Supplemental Oxygen (L/min / FiO2): 6  Independent Airway: airway patency satisfactory and stable  Dentition: dentition unchanged  Vital Signs Stable: post-procedure vital signs reviewed and stable  Report to RN Given: handoff report given  Patient transferred to: PACU    Handoff Report: Identifed the Patient, Identified the Reponsible Provider, Reviewed the pertinent medical history, Discussed the surgical course, Reviewed Intra-OP anesthesia mangement and issues during anesthesia, Set expectations for post-procedure period and Allowed opportunity for questions and acknowledgement of understanding      Vitals:  Vitals Value Taken Time   /70 10/02/23 1018   Temp 36.4  C (97.5  F) 10/02/23 1018   Pulse 71 10/02/23 1023   Resp 20 10/02/23 1023   SpO2 100 % 10/02/23 1023   Vitals shown include unvalidated device data.    Electronically Signed By: PRASAD Iverson CRNA  October 2, 2023  10:24 AM

## 2023-10-02 NOTE — PROGRESS NOTES
[facility]  OUTPATIENT PHYSICAL THERAPY EVALUATION  PLAN OF TREATMENT FOR OUTPATIENT REHABILITATION  (COMPLETE FOR INITIAL CLAIMS ONLY)  Patient's Last Name, First Name, M.I.  YOB: 1960  Jeevan Lopez                        Provider's Name  [facility] Medical Record No.  [id number]                             Onset Date:  10/02/23   Start of Care Date:      Type:     _X_PT   ___OT   ___SLP Medical Diagnosis:                 PT Diagnosis:  impaired mobility Visits from SOC:  1     See note for plan of treatment, functional goals and certification details    I CERTIFY THE NEED FOR THESE SERVICES FURNISHED UNDER        THIS PLAN OF TREATMENT AND WHILE UNDER MY CARE     (Physician co-signature of this document indicates review and certification of the therapy plan).                  10/02/23 9324   Appointment Info   Signing Clinician's Name / Credentials (PT) ASAEL Sanders   Student Supervision On-site supervision provided;Direct supervision provided;Direct Patient Contact Provided;Therapy services provided with the co-signing licensed therapist guiding and directing the services, and providing the skilled judgement and assessment throughout the session       Present no   Language english   Living Environment   People in Home significant other   Current Living Arrangements house   Number of Stairs, Within Home, Primary seven   Stair Railings, Within Home, Primary railings safe and in good condition   Transportation Anticipated family or friend will provide   Self-Care   Usual Activity Tolerance good   Current Activity Tolerance moderate   Regular Exercise Yes   Equipment Currently Used at Home cane, quad;raised toilet seat;shower chair;walker, standard   Fall history within last six months no   General Information   Onset of Illness/Injury or Date of Surgery 10/02/23   Referring Physician Gerardo Crockett, DO    Patient/Family Therapy Goals Statement (PT) not formally discussed with patient   Pertinent History of Current Problem (include personal factors and/or comorbidities that impact the POC) 62 year old male admitted on 10/2/2023. He presented secondary to primary osteoarthritis of right knee and is now status post total right knee arthroplasty.   Existing Precautions/Restrictions fall   Weight-Bearing Status - LUE full weight-bearing   Weight-Bearing Status - RUE full weight-bearing   Weight-Bearing Status - LLE full weight-bearing   Weight-Bearing Status - RLE weight-bearing as tolerated   Cognition   Affect/Mental Status (Cognition) WNL   Orientation Status (Cognition) oriented x 3   Follows Commands (Cognition) WNL   Pain Assessment   Patient Currently in Pain Yes, see Vital Sign flowsheet   Integumentary/Edema   Integumentary/Edema other (describe)   Integumentary/Edema Comments post-op swelling   Posture    Posture Forward head position;Protracted shoulders;Kyphosis   Posture Comments moderate in seated and standing   Range of Motion (ROM)   ROM Comment ROM not formally assessed. Limited knee ROM but functional   Strength (Manual Muscle Testing)   Strength Comments Not formally assessed but able to complete functional mobility   Bed Mobility   Comment, (Bed Mobility) log roll technique d/t previous diastasis recti - min A needed at UE and use of hand rail   Transfers   Comment, (Transfers) sit<>stand min A   Gait/Stairs (Locomotion)   Wilkinson Level (Gait) contact guard   Assistive Device (Gait) walker, front-wheeled   Balance   Balance Comments good seated balance at EOB, standing balance with UE support on FWW   Sensory Examination   Sensory Perception Comments no sensory deficits noted by patient   Clinical Impression   Criteria for Skilled Therapeutic Intervention Yes, treatment indicated   PT Diagnosis (PT) impaired mobility   Influenced by the following impairments decreased ROM, strength, and activity  tolerance   Functional limitations due to impairments difficulty wiht bed mobility, transfers, and ambulation   Clinical Presentation (PT Evaluation Complexity) Stable/Uncomplicated   Clinical Presentation Rationale per clinical judgement   Clinical Decision Making (Complexity) low complexity   Planned Therapy Interventions (PT) balance training;bed mobility training;gait training;home exercise program;patient/family education;ROM (range of motion);strengthening;transfer training;progressive activity/exercise;risk factor education;home program guidelines   Risk & Benefits of therapy have been explained evaluation/treatment results reviewed;care plan/treatment goals reviewed;current/potential barriers reviewed;risks/benefits reviewed   PT Total Evaluation Time   PT Eval, Low Complexity Minutes (87489) 7   Physical Therapy Goals   PT Frequency 2x/day   PT Predicted Duration/Target Date for Goal Attainment 10/03/23   PT Goals Bed Mobility;Transfers;Gait;PT Goal 1;Stairs   PT: Bed Mobility Modified independent   PT: Transfers Assistive device;Modified independent   PT: Gait Modified independent;Assistive device;100 feet   PT: Stairs Supervision/stand-by assist;7 stairs;Rail on left   PT: Goal 1 Independent with LE strengthening HEP   Interventions   Interventions Quick Adds Gait Training;Neuromuscular Re-ed;Therapeutic Activity;Therapeutic Procedure   Therapeutic Procedure/Exercise   Ther. Procedure: strength, endurance, ROM, flexibillity Minutes (96319) 10   Symptoms Noted During/After Treatment fatigue;increased pain   Treatment Detail/Skilled Intervention Pt completed supine LE exercises on R 1x10: AP, QS with limited quad activation, tactile cues needed at back of knee to facilitate better quad use, HS, heel slide with limited range d/t pain. Increased time on exercises needed due to fatigue.   Therapeutic Activity   Therapeutic Activities: dynamic activities to improve functional performance Minutes (81166) 13    Symptoms Noted During/After Treatment Fatigue;Increased pain   Treatment Detail/Skilled Intervention Pt supine in bed upon arrival and agreeable to PT. Supine>sit completed with Nelli x1 and use of bed rail with log roll technique d/t pts past diastesis recti. Once sitting pt had good seated balance at EOB. 1x sit>stand completed with UE use and Nelli x1. Pt needing cues to keep R LE slightly extended in front of him for comfort. Once standing, pt educated on WBAT and ambulation with walker. Pt ambulated 15' at CGA and FWW to bathroom with slow gait and short step length due to pain. Once in bathroom, pt able to stand and complete bathroom hygiene mod-I using walker for balance. Pt ambulated 15' at CGA and FWW to bed due to fatigue. Stand>sit had uncontrolled decent as pt did not reach back for bed to help control sit. Education provided on proper hand placement for sitting down. Sit>supine Nelli x1 with support needed at R LE. Ended session supine in bed with needs in reach.   PT Discharge Planning   PT Plan ambulation tolerance/distance, stairs, bed mobility with flat HOB   PT Discharge Recommendation (DC Rec)   (Defer to ortho)   PT Rationale for DC Rec For progression per TKA protocol   PT Brief overview of current status min A bed mobility, min A transfers, CGA ambulation   Total Session Time   Timed Code Treatment Minutes 23   Total Session Time (sum of timed and untimed services) 30

## 2023-10-03 ENCOUNTER — APPOINTMENT (OUTPATIENT)
Dept: PHYSICAL THERAPY | Facility: CLINIC | Age: 63
End: 2023-10-03
Attending: ORTHOPAEDIC SURGERY
Payer: COMMERCIAL

## 2023-10-03 LAB
FASTING STATUS PATIENT QL REPORTED: YES
GLUCOSE BLDC GLUCOMTR-MCNC: 131 MG/DL (ref 70–99)
GLUCOSE SERPL-MCNC: 147 MG/DL (ref 70–99)
HGB BLD-MCNC: 13 G/DL (ref 13.3–17.7)

## 2023-10-03 PROCEDURE — 82947 ASSAY GLUCOSE BLOOD QUANT: CPT | Mod: 91 | Performed by: PEDIATRICS

## 2023-10-03 PROCEDURE — 97110 THERAPEUTIC EXERCISES: CPT | Mod: GP

## 2023-10-03 PROCEDURE — 36415 COLL VENOUS BLD VENIPUNCTURE: CPT

## 2023-10-03 PROCEDURE — 97530 THERAPEUTIC ACTIVITIES: CPT | Mod: GP

## 2023-10-03 PROCEDURE — 258N000003 HC RX IP 258 OP 636: Performed by: STUDENT IN AN ORGANIZED HEALTH CARE EDUCATION/TRAINING PROGRAM

## 2023-10-03 PROCEDURE — 99214 OFFICE O/P EST MOD 30 MIN: CPT | Performed by: STUDENT IN AN ORGANIZED HEALTH CARE EDUCATION/TRAINING PROGRAM

## 2023-10-03 PROCEDURE — 85018 HEMOGLOBIN: CPT

## 2023-10-03 PROCEDURE — 250N000013 HC RX MED GY IP 250 OP 250 PS 637: Performed by: INTERNAL MEDICINE

## 2023-10-03 PROCEDURE — 250N000011 HC RX IP 250 OP 636

## 2023-10-03 PROCEDURE — 250N000013 HC RX MED GY IP 250 OP 250 PS 637

## 2023-10-03 PROCEDURE — 82962 GLUCOSE BLOOD TEST: CPT

## 2023-10-03 RX ORDER — ASPIRIN 81 MG/1
81 TABLET ORAL 2 TIMES DAILY
Qty: 56 TABLET | Refills: 0 | Status: SHIPPED | OUTPATIENT
Start: 2023-10-03 | End: 2023-10-31

## 2023-10-03 RX ORDER — METHOCARBAMOL 500 MG/1
500 TABLET, FILM COATED ORAL 4 TIMES DAILY
Status: DISCONTINUED | OUTPATIENT
Start: 2023-10-03 | End: 2023-10-03

## 2023-10-03 RX ORDER — METHOCARBAMOL 500 MG/1
500 TABLET, FILM COATED ORAL 4 TIMES DAILY PRN
Qty: 30 TABLET | Refills: 0 | Status: SHIPPED | OUTPATIENT
Start: 2023-10-03 | End: 2023-10-17

## 2023-10-03 RX ORDER — ACETAMINOPHEN 325 MG/1
650 TABLET ORAL EVERY 4 HOURS PRN
Qty: 100 TABLET | Refills: 0 | Status: SHIPPED | OUTPATIENT
Start: 2023-10-05 | End: 2023-11-20

## 2023-10-03 RX ORDER — AMOXICILLIN 250 MG
1 CAPSULE ORAL 2 TIMES DAILY PRN
Qty: 14 TABLET | Refills: 0 | Status: SHIPPED | OUTPATIENT
Start: 2023-10-03 | End: 2023-10-10

## 2023-10-03 RX ORDER — OXYCODONE HYDROCHLORIDE 5 MG/1
5-10 TABLET ORAL EVERY 4 HOURS PRN
Qty: 33 TABLET | Refills: 0 | Status: SHIPPED | OUTPATIENT
Start: 2023-10-03 | End: 2023-11-20

## 2023-10-03 RX ORDER — POLYETHYLENE GLYCOL 3350 17 G/17G
17 POWDER, FOR SOLUTION ORAL DAILY
Qty: 7 PACKET | Refills: 0 | Status: SHIPPED | OUTPATIENT
Start: 2023-10-03 | End: 2023-10-17

## 2023-10-03 RX ORDER — METHOCARBAMOL 500 MG/1
500 TABLET, FILM COATED ORAL 4 TIMES DAILY PRN
Status: DISCONTINUED | OUTPATIENT
Start: 2023-10-03 | End: 2023-10-04 | Stop reason: HOSPADM

## 2023-10-03 RX ADMIN — ASPIRIN 81 MG: 81 TABLET, COATED ORAL at 08:03

## 2023-10-03 RX ADMIN — OXYCODONE HYDROCHLORIDE 5 MG: 5 TABLET ORAL at 22:38

## 2023-10-03 RX ADMIN — OXYCODONE HYDROCHLORIDE 5 MG: 5 TABLET ORAL at 12:38

## 2023-10-03 RX ADMIN — OXYCODONE HYDROCHLORIDE 5 MG: 5 TABLET ORAL at 05:00

## 2023-10-03 RX ADMIN — OXYCODONE HYDROCHLORIDE 5 MG: 5 TABLET ORAL at 09:12

## 2023-10-03 RX ADMIN — SENNOSIDES AND DOCUSATE SODIUM 1 TABLET: 50; 8.6 TABLET ORAL at 20:22

## 2023-10-03 RX ADMIN — SODIUM CHLORIDE, POTASSIUM CHLORIDE, SODIUM LACTATE AND CALCIUM CHLORIDE 1000 ML: 600; 310; 30; 20 INJECTION, SOLUTION INTRAVENOUS at 11:43

## 2023-10-03 RX ADMIN — POLYETHYLENE GLYCOL 3350 17 G: 17 POWDER, FOR SOLUTION ORAL at 08:03

## 2023-10-03 RX ADMIN — PANTOPRAZOLE SODIUM 40 MG: 40 TABLET, DELAYED RELEASE ORAL at 08:03

## 2023-10-03 RX ADMIN — ACETAMINOPHEN 975 MG: 325 TABLET, FILM COATED ORAL at 05:00

## 2023-10-03 RX ADMIN — METHOCARBAMOL 500 MG: 500 TABLET ORAL at 04:04

## 2023-10-03 RX ADMIN — SENNOSIDES AND DOCUSATE SODIUM 1 TABLET: 50; 8.6 TABLET ORAL at 08:02

## 2023-10-03 RX ADMIN — HYDROMORPHONE HYDROCHLORIDE 0.4 MG: 1 INJECTION, SOLUTION INTRAMUSCULAR; INTRAVENOUS; SUBCUTANEOUS at 20:21

## 2023-10-03 RX ADMIN — ASPIRIN 81 MG: 81 TABLET, COATED ORAL at 20:21

## 2023-10-03 RX ADMIN — ACETAMINOPHEN 975 MG: 325 TABLET, FILM COATED ORAL at 13:59

## 2023-10-03 RX ADMIN — OXYCODONE HYDROCHLORIDE 5 MG: 5 TABLET ORAL at 18:07

## 2023-10-03 RX ADMIN — ACETAMINOPHEN 975 MG: 325 TABLET, FILM COATED ORAL at 22:38

## 2023-10-03 RX ADMIN — AMLODIPINE BESYLATE 10 MG: 10 TABLET ORAL at 08:03

## 2023-10-03 ASSESSMENT — ACTIVITIES OF DAILY LIVING (ADL)
ADLS_ACUITY_SCORE: 23
ADLS_ACUITY_SCORE: 24
ADLS_ACUITY_SCORE: 23
ADLS_ACUITY_SCORE: 24
ADLS_ACUITY_SCORE: 23

## 2023-10-03 NOTE — PROGRESS NOTES
Orthopaedic Surgery Progress Note 10/03/2023    S: Patient w mild chest pressure overnight. Medicine paged overnight. Evaluated at bedside, at that time, pressure had resolved. No ECG performed d/t absence of sx. Otherwise no other issues. AF, VSS. Pain well controlled. Tolerating PO. OOB multiple times. Feeling up to going home today.     O:  Temp: 98.7  F (37.1  C) Temp src: Oral BP: (!) 155/84 Pulse: 76   Resp: 18 SpO2: 96 % O2 Device: BiPAP/CPAP Oxygen Delivery: 6 LPM    Exam:  Gen: No acute distress, resting comfortably in bed.  Resp: Non-labored breathing  MSK:  RLE:  - Dressings c/d/i  - SILT femoral/tibial/sural/saphenous/DP/SP nerves  - Fires Quad, TA, EHL, FHL, GaSC  - PT/DP pulses 2+, foot wwp      Assessment: Jeevan Lopez is a 62 year old male s/p Procedure(s):  ARTHROPLASTY,RIGHT  KNEE, TOTAL on 10/2/2023 with Dr. Davies.     Activity: Up with assist and assistive devices as needed until independent. Knee ROM as tolerated.   Weight bearing status: WBAT    Antibiotics: Cefazolin x 24 hours  Diet: Begin with clear fluids and progress diet as tolerated. Bowel regimen. Anti-emetics PRN.    DVT prophylaxis:  ASA 81 mg BID x 4 weeks   Elevation: Elevate heels off of bed on pillows, no pillows behind the knee at any time    Wound Care: Tegaderm and alginate x 2 weeks   Pain management: Orals PRN, IV for breakthrough only  X-rays: AP/Lat operative knee XR in PACU.  Physical Therapy: Mobilization, ROM, ADL's  Occupational Therapy: ADL's  Labs: Trend Hgb on PODs #1 & 2  Cultures: None  Consults: PT, OT. Hospitalist, appreciate assistance in caring for this patient throughout the perioperative period    Follow-up: As below  Future Appointments   Date Time Provider Department Center   10/3/2023  9:00 AM Pati Wilhelm PT [facility] [place]   10/3/2023  9:00 AM Tatum Ramsey OT [facility] [place]   10/3/2023  2:30 PM Pati Wilhelm PT [facility] [place]   10/5/2023  1:00 PM Marilee Vital PT [facility] [place] [facility]    10/9/2023 10:15 AM Farrah Montano, PT [facility] [place] [facility]   10/16/2023 10:15 AM Farrah Montano, PT [facility] [place] [facility]   10/17/2023 10:00 AM MG NURSE ONLY [facility] [place]   11[facility]14/2023 10:00 [facility]       Disposition: Pending progress with therapies, pain control on orals, and medical stability, anticipate discharge to home on POD #1-2.    Darian Carter MD  Orthopaedic Surgery PGY-4

## 2023-10-03 NOTE — PLAN OF CARE
Plan of Care Reviewed With: patient    Overall Patient Progress: improving    Outcome Evaluation: POD1, tolerating activity and PO intake well.  Using CPAP overnight.  R knee dressing and ace CDI.  Moderate knee and back pain, managed with PRNs and ICE.  Up to BR SBA, adequate UOP.  D/c to home today pending progress with therapies.

## 2023-10-03 NOTE — PROGRESS NOTES
[facility]    Medicine Progress Note - Hospitalist Service, GOLD TEAM 21    Date of Admission:  10/2/2023    Assessment & Plan   Jeevan Lopez is a 62 year old male admitted on 10/2/2023. He presented secondary to primary osteoarthritis of right knee and is now status post total right knee arthroplasty on 10/2.    #Primary osteoarthritis of the right knee  Defer cares related to need to Orthopedics service  - PT/OT  - pain management to Orthopedics service     #Hypertension   #Dyslipidemia  - Not on statin per PTA meds  - Restarted amlodipine  - PRN hydralazine for sbp greater than 170    #Obstructive sleep apnea  #Morbid obesity  -CPAP (home device)    #Alcohol use disorder  #Alcoholic induced peripheral neuropathy  Patient is in precontemplative phase per last family medicine note, will discuss medication assistance options prior to discharge, offer follow-up with PCP.     #GERD  Converted home omeprazole to Pantoprazole while inpatient.  Patient can discharge on home omeprazole    #Hx Adenomatous polyp  Appears repeat colonoscopy was performed on 6/19/2023    #Meningioma   Followed by Dr. Dillard with neurosurgery. Follow up OP with primary neurosurgery team     #Hyperglycemia:   Likely related to acute physiologic stress, no history of diabetes.  Hemoglobin A1c last checked at 5.4 on 1/27/2023       Diet: Advance Diet as Tolerated: Regular Diet Adult  Discharge Instruction - Regular Diet Adult    DVT Prophylaxis: Defer to primary service  Otoole Catheter: Not present  Lines: None     Cardiac Monitoring: None  Code Status: Full Code      Clinically Significant Risk Factors Present on Admission                # Drug Induced Platelet Defect: home medication list includes an antiplatelet medication   # Hypertension: Noted on problem list      # Obesity: Estimated body mass index is 38.84 kg/m  as calculated from the following:    Height as of this encounter: 1.753 m (5'  "9\").    Weight as of this encounter: 119.3 kg (263 lb 0.1 oz).              Disposition Plan     Expected Discharge Date: 10/03/2023                  Gume Almanza MD  Hospitalist Service, GOLD TEAM 21  [facility]  Securely message with [employer] (more info)  Text page via [facility] Paging/Directory   See signed in provider for up to date coverage information  ______________________________________________________________________    Interval History   No acute events overnight but patient still not eating well.  Worked with PT in AM but still somewhat unsteady on his feet.  Concerned with ongoing pain in leg and distal thigh, improved with IV and PO pain management.  No bowel movement yet since surgery, continues to take miralax and senna.  One episode of hypotension with diaphoresis and mild nausea this AM which resolved with laying down. No recurrence after 1L bolus.  Otherwise denies chest pain, N/V, fevers/chills, headache, or dizziness.      Physical Exam   Vital Signs: Temp: 98.2  F (36.8  C) Temp src: Oral BP: (!) 164/86 Pulse: 70   Resp: 18 SpO2: 93 % O2 Device: None (Room air)    Weight: 263 lbs .14 oz    General Appearance: NAD, laying comfortably in bed  Respiratory: Comfortable on RA, CTAB  Cardiovascular: RRR, no m/r/g  GI: Abd mildly distended but soft  Skin: No abnormal bruising or rash, leg not visualized due to bandage     Medical Decision Making       35 MINUTES SPENT BY ME on the date of service doing chart review, history, exam, documentation & further activities per the note.      Data     I have personally reviewed the following data over the past 24 hrs:    N/A  \   13.0 (L)   / N/A     N/A N/A N/A /  131 (H)   N/A N/A N/A \       Imaging results reviewed over the past 24 hrs:   No results found for this or any previous visit (from the past 24 hour(s)).  "

## 2023-10-03 NOTE — PROGRESS NOTES
Pt reporting moderate chest pressure/pain (sharp and intermittent) along with mild HA.  Pt states he has been experiencing this for a few years now and usually resolves with repositioning and doses of aspirin.  Pt unsure of cause, thinks it's not r/t GERD.     POD1 R TKA.  FYI sent to [facility].  [facility] plans to assess pt at bedside.

## 2023-10-03 NOTE — PLAN OF CARE
"Goal Outcome Evaluation:      Plan of Care Reviewed With: patient    Overall Patient Progress: improvingOverall Patient Progress: improving    Outcome Evaluation: Pt is A&Ox4, able to make needs known, pt is POD1, activity tolerance have been a little difficult for pt this shift. C/o  moderaate knee pain PRN oxy + scheduled tylenol givenx1 along with ICE packs. Pt had hypotension this shift along with excessive sweating and feeling faint. MD notified and LR bolus administer per MD orders. Pt is up with 1assist using walker and gb, pt finds it difficult getting out of bed. Per MD, pt will stay another day to be observe for any more hypotensive concerns. No other significant concerns this shift, will continue with POC.    Patient most recent vitals:  BP (!) 151/81 (BP Location: Right arm)   Pulse 69   Temp 98.9  F (37.2  C) (Oral)   Resp 18   Ht 1.753 m (5' 9\")   Wt 119.3 kg (263 lb 0.1 oz)   SpO2 95%   BMI 38.84 kg/m     "

## 2023-10-03 NOTE — PROGRESS NOTES
"NA called writer to room at 9:25am, writer walked in pt's room and pt was sitting up in chair after PT sweating excessively, pt stating '' I think I am going to pass out. I don't feel good at all\". Writer then tried to calm pt down and BP was checked which was 88/50. Writer then got patient back to bed and rechecked BP which was 132/69 while lying. MD notified and BP will be checked again in about 30 minutes.   "

## 2023-10-03 NOTE — SIGNIFICANT EVENT
Significant Event Note    Time of event: 12:20 AM October 3, 2023    Description of event:  Patient reported chest pressure earlier in evening. He states it was like electric shock, started on left side of chest and spread to right side. He has had it for almost 10 years. Usually resolves with aspirin. At mary eof our interview this chest pain has resolved and no further episodes.     Plan:  Continue to monitor  -If repeat chest pressure will obtain ECG and troponin but in absence of symptoms will hold off at this moment    Discussed with: bedside nurse and patient    Anshu Drake MD

## 2023-10-03 NOTE — PLAN OF CARE
VS: VSS, pt denied CP or SOB.   O2: Room air sat. > 90%.   Output: Voiding okay using urinal.    Last BM: 10/01/23   Activity: Up walked to bathroom with PT.   Skin: Intact except surgical incision.    Pain: Comfortably manageable with PRN medication.    Neuro: CMS and neuro intact to baseline.    Dressing: CDI.    Diet: Regular diet tolerating okay.    LDA: PIV SL.    Equipment: IV pole and personal belongings.    Plan: TBD.   Additional Info: Pt arrived on 5 ortho room 537 about 12:30 pm via bed, pt oriented to room and call light, pt resting comfortable, call light in reach.

## 2023-10-04 ENCOUNTER — APPOINTMENT (OUTPATIENT)
Dept: PHYSICAL THERAPY | Facility: CLINIC | Age: 63
End: 2023-10-04
Attending: ORTHOPAEDIC SURGERY
Payer: COMMERCIAL

## 2023-10-04 ENCOUNTER — APPOINTMENT (OUTPATIENT)
Dept: OCCUPATIONAL THERAPY | Facility: CLINIC | Age: 63
End: 2023-10-04
Payer: COMMERCIAL

## 2023-10-04 VITALS
DIASTOLIC BLOOD PRESSURE: 86 MMHG | SYSTOLIC BLOOD PRESSURE: 161 MMHG | RESPIRATION RATE: 16 BRPM | TEMPERATURE: 98.3 F | OXYGEN SATURATION: 98 % | HEIGHT: 69 IN | BODY MASS INDEX: 38.96 KG/M2 | WEIGHT: 263.01 LBS | HEART RATE: 77 BPM

## 2023-10-04 LAB
FASTING STATUS PATIENT QL REPORTED: YES
GLUCOSE SERPL-MCNC: 134 MG/DL (ref 70–99)
HGB BLD-MCNC: 13.1 G/DL (ref 13.3–17.7)

## 2023-10-04 PROCEDURE — 97535 SELF CARE MNGMENT TRAINING: CPT | Mod: GO

## 2023-10-04 PROCEDURE — 82947 ASSAY GLUCOSE BLOOD QUANT: CPT | Performed by: STUDENT IN AN ORGANIZED HEALTH CARE EDUCATION/TRAINING PROGRAM

## 2023-10-04 PROCEDURE — 250N000013 HC RX MED GY IP 250 OP 250 PS 637

## 2023-10-04 PROCEDURE — 85018 HEMOGLOBIN: CPT

## 2023-10-04 PROCEDURE — 97165 OT EVAL LOW COMPLEX 30 MIN: CPT | Mod: GO

## 2023-10-04 PROCEDURE — 97530 THERAPEUTIC ACTIVITIES: CPT | Mod: GP

## 2023-10-04 PROCEDURE — 36415 COLL VENOUS BLD VENIPUNCTURE: CPT | Performed by: STUDENT IN AN ORGANIZED HEALTH CARE EDUCATION/TRAINING PROGRAM

## 2023-10-04 PROCEDURE — 250N000013 HC RX MED GY IP 250 OP 250 PS 637: Performed by: INTERNAL MEDICINE

## 2023-10-04 RX ADMIN — ACETAMINOPHEN 975 MG: 325 TABLET, FILM COATED ORAL at 05:54

## 2023-10-04 RX ADMIN — ASPIRIN 81 MG: 81 TABLET, COATED ORAL at 08:25

## 2023-10-04 RX ADMIN — PANTOPRAZOLE SODIUM 40 MG: 40 TABLET, DELAYED RELEASE ORAL at 08:25

## 2023-10-04 RX ADMIN — OXYCODONE HYDROCHLORIDE 10 MG: 10 TABLET ORAL at 08:24

## 2023-10-04 RX ADMIN — OXYCODONE HYDROCHLORIDE 10 MG: 10 TABLET ORAL at 12:36

## 2023-10-04 RX ADMIN — OXYCODONE HYDROCHLORIDE 10 MG: 10 TABLET ORAL at 03:50

## 2023-10-04 RX ADMIN — AMLODIPINE BESYLATE 10 MG: 10 TABLET ORAL at 08:25

## 2023-10-04 ASSESSMENT — ACTIVITIES OF DAILY LIVING (ADL)
ADLS_ACUITY_SCORE: 24
ADLS_ACUITY_SCORE: 24
PREVIOUS_RESPONSIBILITIES: MEAL PREP;HOUSEKEEPING;LAUNDRY;SHOPPING;YARDWORK;FINANCES;DRIVING
ADLS_ACUITY_SCORE: 24

## 2023-10-04 NOTE — PLAN OF CARE
Occupational Therapy Discharge Summary    Reason for therapy discharge:    All goals and outcomes met, no further needs identified.    Progress towards therapy goal(s). See goals on Care Plan in [facility] electronic health record for goal details.  Goals met    Therapy recommendation(s):    No further therapy is recommended.

## 2023-10-04 NOTE — PROGRESS NOTES
"SBP >160 all evening.  Temp 100.7.  Pt stated he felt \"off\" and reports that he felt warm a couple times during the day.  ABREU, diaphoretic after activity.  Denies CP, HA.  Sched tylenol given, cool cloth and ICE applied.  Plan to recheck VS.  Pt tends to down play symptoms, and withdraw reported statements when Writer asks further questions.  STER[place] paged [facility].  Xcover will assess pt at bedside.      0030 - T recheck 98.7, no complaints from pt at this time.   "

## 2023-10-04 NOTE — DISCHARGE SUMMARY
DISCHARGE SUMMARY    Pt discharging to: home  Transportation: wheel chaired to front lobby, left with wife.   AVS given and discussed: This RN gave AVS discussed and answered pt & wife questions.   Stoplight Tool given and discussed: Yes, pt verbalized understanding.   Medications given: yes  Belongings returned: yes  Comments:     This RN removed Left hand PIV before pt discharged.

## 2023-10-04 NOTE — PLAN OF CARE
Plan of Care Reviewed With: patient    Overall Patient Progress: improving    Outcome Evaluation: Pt tolerating activity better this shift.  Using CPAP while asleep.  Pain well managed with PRNs + ICE.  Dressing and acewrap CDI.  Voiding adequately.  Possible d/c to home today.

## 2023-10-04 NOTE — PLAN OF CARE
Physical Therapy Discharge Summary    Reason for therapy discharge:    All goals and outcomes met, no further needs identified.    Progress towards therapy goal(s). See goals on Care Plan in [facility] electronic health record for goal details.  Goals met    Therapy recommendation(s):    Continued therapy is recommended.  Rationale/Recommendations:  Defer to ortho.

## 2023-10-04 NOTE — DISCHARGE SUMMARY
[facility]  Orthopedic Surgery Discharge Summary     Date of Admission: 10/2/2023  Date of Discharge: 10/4/2023 12:54 PM  Disposition: Home  Staff Physician: Darian Davies MD  Primary Care Provider: Joe Licea    ADMISSION DIAGNOSIS:  Primary osteoarthritis of right knee    DISCHARGE DIAGNOSIS:  Primary osteoarthritis of right knee   PROCEDURES: Procedure(s):  ARTHROPLASTY,RIGHT  KNEE, TOTAL on 10/2/2023    BRIEF HISTORY:  Jeevan Lopez is a 62 year old male s/p Procedure(s):  ARTHROPLASTY,RIGHT  KNEE, TOTAL on 10/2/2023 with Dr. Davies.    HOSPITAL COURSE:    The patient was admitted following the above listed procedures for pain control and rehabilitation. Jeevan Lopez did well post-operatively. Medicine was consulted post operatively to aid in management of medical co-morbidities. The patient received routine nursing cares and at the time of discharge was medically stable. Vital signs were stable throughout admission. The patient is tolerating a regular diet and is voiding spontaneously. All PT/OT goals have been met for safe mobility. Pain is now controlled on oral medications which will be available on discharge. Stool softeners have been used while taking pain medications to help prevent constipation. Jeevan Lopez is deemed medically safe to discharge on POD2.     Antibiotics:  Ancef given periop and 24 hours postop.  DVT prophylaxis:  Mechanical prophylaxis initiated after surgery.   PT Progress:  Has met PT/OT goals for safe mobility.    Pain Meds:  Weaned off all IV pain meds by discharge.  Inpatient Events: No significant events or complications.     PHYSICAL EXAM:    Please see exam from orthopedic progress note on the day of discharge.     FOLLOWUP:    Follow up with Dr. Davies team at 2 weeks postoperatively.    Future Appointments   Date Time Provider Department Center   10/5/2023  1:00 PM Marilee Vital, PT [facility]   10/9/2023 10:15 AM Dusty  Farrah, PT [facility]   10/16/2023 10:15 AM Farrah Montano, PT [facility]   10/17/2023 10:00 AM [facility]   11/14/2023 10:00 AM Darian Davies MD [facility]     Orthopedic surgery appointments are at the [facility] [place] ([address] [place] [address]). Call [phone] to schedule a follow-up appointment at this location with your provider.     PLANNED DISCHARGE ORDERS:     Activity: See below.     Wound Care: See below.      Discharge Medication List as of 10/4/2023 12:04 PM        START taking these medications    Details   acetaminophen (TYLENOL) 325 MG tablet Take 2 tablets (650 mg) by mouth every 4 hours as needed for other (For optimal non-opioid multimodal pain management to improve pain control.), Disp-100 tablet, R-0, E-Prescribe      aspirin 81 MG EC tablet Take 1 tablet (81 mg) by mouth 2 times daily for 28 days, Disp-56 tablet, R-0, E-Prescribe      methocarbamol (ROBAXIN) 500 MG tablet Take 1 tablet (500 mg) by mouth 4 times daily as needed for muscle spasms, Disp-30 tablet, R-0, E-Prescribe      oxyCODONE (ROXICODONE) 5 MG tablet Take 1-2 tablets (5-10 mg) by mouth every 4 hours as needed for moderate pain, Disp-33 tablet, R-0, E-Prescribe      polyethylene glycol (MIRALAX) 17 g packet Take 17 g by mouth daily, Disp-7 packet, R-0, E-Prescribe      senna-docusate (SENOKOT-S/PERICOLACE) 8.6-50 MG tablet Take 1 tablet by mouth 2 times daily as needed for constipation, Disp-14 tablet, R-0, E-Prescribe           CONTINUE these medications which have NOT CHANGED    Details   amLODIPine (NORVASC) 10 MG tablet Take 1 tablet (10 mg) by mouth daily, Disp-90 tablet, R-3, E-Prescribe      cyanocobalamin (VITAMIN B-12) 1000 MCG tablet Take by mouth daily, Historical      omeprazole 20 MG tablet Take 1 tablet (20 mg) by mouth daily, Disp-90 tablet, R-4, E-Prescribe      Vitamin A 7.5 MG (11742 UT) CAPS Take 1 capsule by mouth  "daily, Historical      vitamin D3 (CHOLECALCIFEROL) 250 mcg (86470 units) capsule Take 1 capsule by mouth daily, Historical           STOP taking these medications       aspirin (ASA) 81 MG tablet Comments:   Reason for Stopping:                 Discharge Procedure Orders   Referral Order to Outpatient Physical Therapy   Standing Status: Future   Referral Priority: Routine Referral Type: Rehab Therapy Physical Therapy   Number of Visits Requested: 1     Reason for your hospital stay   Order Comments: Total knee arthroplasty with Dr. Davies     When to call - Contact Surgeon Team   Order Comments: You may experience symptoms that require follow-up before your scheduled appointment. Refer to the \"Stoplight Tool\" for instructions on when to contact your Surgeon Team if you are concerned about pain control, blood clots, constipation, or if you are unable to urinate.     When to call - Reach out to Urgent Care   Order Comments: If you are not able to reach your Surgeon Team and you need immediate care, go to the Orthopedic Walk-in Clinic or Urgent Care at your Surgeon's office.  Do NOT go to the Emergency Room unless you have shortness of breath, chest pain, or other signs of a medical emergency.     When to call - Reasons to Call 911   Order Comments: Call 911 immediately if you experience sudden-onset chest pain, arm weakness/numbness, slurred speech, or shortness of breath     Discharge Instruction - Breathing exercises   Order Comments: Perform breathing exercises using your Incentive Spirometer 10 times per hour while awake for 2 weeks.     Symptoms - Fever Management   Order Comments: A low grade fever can be expected after surgery.  Use acetaminophen (TYLENOL) as needed for fever management.  Contact your Surgeon Team if you have a fever greater than 101.5 F, chills, and/or night sweats.     Symptoms - Constipation management   Order Comments: Constipation (hard, dry bowel movements) is expected after surgery due " to the combination of being less active, the anesthetic, and the opioid pain medication.  You can do the following to help reduce constipation:  ~  FLUIDS:  Drink clear liquids (water or Gatorade), or juice (apple/prune).  ~  DIET:  Eat a fiber rich diet.    ~  ACTIVITY:  Get up and move around several times a day.  Increase your activity as you are able.  MEDICATIONS:  Reduce the risk of constipation by starting medications before you are constipated.  You can take Miralax   (1 packet as directed) and/or a stool softener (Senokot 1-2 tablets 1-2 times a day).  If you already have constipation and these medications are not working, you can get magnesium citrate and use as directed.  If you continue to have constipation you can try an over the counter suppository or enema.  Call your Surgeon Team if it has been greater than 3 days since your last bowel movement.     Symptoms - Reduced Urine Output   Order Comments: Changes in the amount of fluids you drank before and after surgery may result in problems urinating.  It is important to stay well-hydrated after surgery and drink plenty of water. If it has been greater than 8 hours since you have urinated despite drinking plenty of water, call your Surgeon Team.     Activity - Exercises to prevent blood clots   Order Comments: Unless otherwise directed by your Surgeon team, perform the following exercises at least three times per day for the first four weeks after surgery to prevent blood clots in your legs: 1) Point and flex your feet (Ankle Pumps), 2) Move your ankle around in big circles, 3) Wiggle your toes, 4) Walk, even for short distances, several times a day, will help decrease the risk of blood clots.     Order Specific Question Answer Comments   Is discharge order? Yes      Comfort and Pain Management - Pain after Surgery   Order Comments: Pain after surgery is normal and expected.  You will have some amount of pain for several weeks after surgery.  Your pain  "will improve with time.  There are several things you can do to help reduce your pain including: rest, ice, elevation, and using pain medications as needed. Contact your Surgeon Team if you have pain that persists or worsens after surgery despite rest, ice, elevation, and taking your medication(s) as prescribed. Contact your Surgeon Team if you have new numbness, tingling, or weakness in your operative extremity.     Comfort and Pain Management - Swelling after Surgery   Order Comments: Swelling and/or bruising of the surgical extremity is common and may persist for several months after surgery. In addition to frequent icing and elevation, gentle compressive support with an ACE wrap or tubigrip may help with swelling. Apply compression regularly, removing at least twice daily to perform skin checks. Contact your Surgeon Team if your swelling increases and is NOT associated with an increase in your activity level, or if your swelling increases and is associated with redness and pain.     Comfort and Pain Management - LOWER Extremity Elevation   Order Comments: Swelling is expected for several months after surgery. This type of swelling is usually associated with gravity and activity, and can be improved with elevation.   The best way to do this is to get your \"toes above your nose\" by laying down and placing several pillows lengthwise under your calf and heel. When elevating your leg keep your knee completely straight. Perform this elevation as often as possible especially for the first two weeks after surgery.     Comfort and Pain Management - Cold therapy   Order Comments: Ice can be used to control swelling and discomfort after surgery. Place a thin towel over your operative site and apply the ice pack overtop. Leave ice pack in place for 20 minutes, then remove for 20 minutes. Repeat this 20 minutes on/20 minutes off routine as often as tolerated.     Medication Instructions - Acetaminophen (TYLENOL) Instructions "   Order Comments: You were discharged with acetaminophen (TYLENOL) for pain management after surgery. Acetaminophen most effectively manages pain symptoms when it is taken on a schedule without missing doses (every four, six, or eight hours). Your Provider will prescribe a safe daily dose between 3000 - 4000 mg.  Do NOT exceed this daily dose. Most patients use acetaminophen for pain control for the first four weeks after surgery.  You can wean from this medication as your pain decreases.     Medication Instructions - NSAID Instructions   Order Comments: You were discharged with an anti-inflammatory medication for pain management to use in combination with acetaminophen (TYLENOL) and the narcotic pain medication.  Take this medication exactly as directed.  You should only take one anti-inflammatory at a time.  Some common anti-inflammatories include: ibuprofen (ADVIL, MOTRIN), naproxen (ALEVE, NAPROSYN), celecoxib (CELEBREX), meloxicam (MOBIC), ketorolac (TORADOL).  Take this medication with food and water.     Medication Instructions - Opioids - Tapering Instructions   Order Comments: In the first three days following surgery, your symptoms may warrant use of the narcotic pain medication every four to six hours as prescribed. This is normal. As your pain symptoms improve, focus your efforts on decreasing (tapering) use of narcotic medications. The most successful tapering strategy is to first, decrease the number of tablets you take every 4-6 hours to the minimum prescribed. Then, increase the amount of time between doses.  For example:  First, taper to   or 1 tablet every 4-6 hours.  Then, taper to   or 1 tablet every 6-8 hours.  Then, taper to   or 1 tablet every 8-10 hours.  Then, taper to   or 1 tablet every 10-12 hours.  Then, taper to   or 1 tablet at bedtime.  The bedtime dose can help with comfort during sleep and is typically the last dose to be discontinued after surgery.     Follow Up Care   Order  Comments: Follow-up with your Surgeon Team in 2 weeks for wound check.     Physical Therapy Instructions   Order Comments: Begin physical therapy within one week following surgery.     Activity - Total Knee Arthroplasty     Order Specific Question Answer Comments   Is discharge order? Yes      Return to Driving   Order Comments: Return to driving - Driving is NOT permitted until directed by your provider. Under no circumstance are you permitted to drive while using narcotic pain medications.     Order Specific Question Answer Comments   Is discharge order? Yes      Return to athletic activities   Order Comments: Return to athletic activities- Activities such as swimming, bicycling, jogging, running, and stop-and-go sports should be avoided until permitted by your Provider.     Order Specific Question Answer Comments   Is discharge order? Yes      Return to School / Work   Order Comments: Return to School / Work: You may return to work/school when directed by your Provider.     Order Specific Question Answer Comments   Is discharge order? Yes      NO Precautions   Order Comments: No precautions directed by your Provider.  You may perform range of motion activities as tolerated.     Order Specific Question Answer Comments   Is discharge order? Yes      Weight bearing as tolerated   Order Comments: Weight bearing as tolerated on your operative extremity.     Order Specific Question Answer Comments   Is discharge order? Yes      Dressing / Wound Care - Wound   Order Comments: You have a clean dressing on your surgical wound. Dressing change instructions as follows: dressing will be removed at your follow-up appointment. Contact your Surgeon Team if you have increased redness, warmth around the surgical wound, and/or drainage from the surgical wound.     Dressing Wound Care - Shower with wound/dressing NOT covered   Order Comments: You do not need to cover your dressing or incision in the shower, you may allow water and  soap to run over top of the surgical dressing or incision. You may shower 2 days after surgery.  You are strictly prohibited from soaking or submerging the surgical wound underwater.     Dressing / Wound Care - NO Tub Bathing   Order Comments: Tub bathing, swimming, or any other activities that will cause your incision to be submerged in water should be avoided until allowed by your Surgeon.     Medication instructions -  Anticoagulation - aspirin   Order Comments: Take the aspirin as prescribed for a total of four weeks after surgery.  This is given to help minimize your risk of blood clot.     Medication Instructions - Opioid Instructions (Less than 65 years)   Order Comments: You were discharged with an opioid medication (hydromorphone, oxycodone, hydrocodone, or tramadol). This medication should only be taken for breakthrough pain that is not controlled with acetaminophen (TYLENOL). If you rate your pain less than 3 you do not need this medication.  Pain rating 0-3:  You do not need this medication.  Pain rating 4-6:  Take 1 tablet every 4-6 hours as needed  Pain rating 7-10:  Take 2 tablets every 4-6 hours as needed.  Do not exceed 6 tablets per day     Crutches DME   Order Comments: DME Documentation: Describe the reason for need to support medical necessity: Impaired gait status post knee surgery. I, the undersigned, certify that the above prescribed supplies are medically necessary for this patient and is both reasonable and necessary in reference to accepted standards of medical practice in the treatment of this patient's condition and is not prescribed as a convenience.     Order Specific Question Answer Comments   DME Provider: [facility]    Crutch Type: Standard    Crutches Add On: NA    Length of Need: Lifetime      Cane DME   Order Comments: DME Documentation: Describe the reason for need to support medical necessity: Impaired gait status post knee surgery. I, the undersigned, certify that the  above prescribed supplies are medically necessary for this patient and is both reasonable and necessary in reference to accepted standards of medical practice in the treatment of this patient's condition and is not prescribed as a convenience.     Order Specific Question Answer Comments   DME Provider: [employer]    Cane Type: Single Tip    Reminder: Patient can typically get 1 every 5 years      Walker DME   Order Comments: DME Documentation: Describe the reason for need to support medical necessity: Impaired gait status post knee surgery. I, the undersigned, certify that the above prescribed supplies are medically necessary for this patient and is both reasonable and necessary in reference to accepted standards of medical practice in the treatment of this patient's condition and is not prescribed as a convenience.     Order Specific Question Answer Comments   DME Provider: [employer]    Walker Type: Standard (2 Wheel)    Accessories: N/A      Discharge Instruction - Regular Diet Adult   Order Comments: Return to your pre-surgery diet unless instructed otherwise     Order Specific Question Answer Comments   Is discharge order? Yes        Darian Carter MD  Orthopaedic Surgery PGY-4  [phone]

## 2023-10-04 NOTE — PROGRESS NOTES
Orthopaedic Surgery Progress Note 10/04/2023    S: ARPITA. Felt warm, resolved after ice and tylenol. Slightly hypertensive. No other concerns. Discussed going home today. He states it is time to get out of here today.     O:  Temp: 98.7  F (37.1  C) Temp src: Oral BP: (!) 158/88 Pulse: 75   Resp: 18 SpO2: 96 % O2 Device: BiPAP/CPAP      Exam:  Gen: No acute distress, resting comfortably in bed.  Resp: Non-labored breathing  MSK:  RLE:  - Dressings c/d/i  - SILT femoral/tibial/sural/saphenous/DP/SP nerves  - Fires Quad, TA, EHL, FHL, GaSC  - PT/DP pulses 2+, foot wwp      Assessment: Jeevan Lopez is a 62 year old male s/p Procedure(s):  ARTHROPLASTY,RIGHT  KNEE, TOTAL on 10/2/2023 with Dr. Davies.     Activity: Up with assist and assistive devices as needed until independent. Knee ROM as tolerated.   Weight bearing status: WBAT    Antibiotics: Cefazolin x 24 hours  Diet: Begin with clear fluids and progress diet as tolerated. Bowel regimen. Anti-emetics PRN.    DVT prophylaxis:  ASA 81 mg BID x 4 weeks   Elevation: Elevate heels off of bed on pillows, no pillows behind the knee at any time    Wound Care: Tegaderm and alginate x 2 weeks   Pain management: Orals PRN, IV for breakthrough only  X-rays: AP/Lat operative knee XR in PACU.  Physical Therapy: Mobilization, ROM, ADL's  Occupational Therapy: ADL's  Labs: Trend Hgb on PODs #1 & 2  Cultures: None  Consults: PT, OT. Hospitalist, appreciate assistance in caring for this patient throughout the perioperative period    Follow-up: As below  Future Appointments   Date Time Provider Department Center   10/3/2023  9:00 AM Pati Wilhelm PT [facility] [place]   10/3/2023  9:00 AM Tatum Ramsey, OT [facility] [place]   10/3/2023  2:30 PM Pati Wilhelm, PT [facility] [place]   10/5/2023  1:00 PM [facility]   10/9/2023 10:15 AM Farrah Montano, PT [facility]   10/16/2023 10:15 AM Farrah Montano PT [facility]   10/17/2023 10:00 AM   NURSE ONLY [facility]   11/14/2023 10:00 AM Darian Davies MD [facility]       Disposition: Pending progress with therapies, pain control on orals, and medical stability, anticipate discharge to home on POD #1-2.    Darian Carter MD  Orthopaedic Surgery PGY-4

## 2023-10-04 NOTE — PROGRESS NOTES
[facility]  OUTPATIENT OCCUPATIONAL THERAPY  EVALUATION  PLAN OF TREATMENT FOR OUTPATIENT REHABILITATION  (COMPLETE FOR INITIAL CLAIMS ONLY)  Patient's Last Name, First Name, M.I.  YOB: 1960  Jeevan Lpoez                          Provider's Name  [facility] Medical Record No.  [id number]                             Onset Date:  10/02/23   Start of Care Date:  10/02/23   Type:     ___PT   _X_OT   ___SLP Medical Diagnosis:  R TKA                    OT Diagnosis:  Impaired participation in ADLs Visits from SOC:  1     See note for plan of treatment, functional goals and certification details    I CERTIFY THE NEED FOR THESE SERVICES FURNISHED UNDER        THIS PLAN OF TREATMENT AND WHILE UNDER MY CARE     (Physician co-signature of this document indicates review and certification of the therapy plan).                          10/04/23 1103   Appointment Info   Signing Clinician's Name / Credentials (OT) Lesa Chris OTS   Student Supervision Therapy services provided with the co-signing licensed therapist guiding and directing the services, and providing the skilled judgement and assessment throughout the session   Living Environment   People in Home significant other   Current Living Arrangements house   Number of Stairs, Within Home, Primary seven   Transportation Anticipated family or friend will provide   Living Environment Comments all needs on one floor, tub shower, shower chair, elevated toilet seat, grab bars,   Self-Care   Usual Activity Tolerance good   Current Activity Tolerance moderate   Regular Exercise Yes   Activity/Exercise Type biking   Equipment Currently Used at Home cane, quad;raised toilet seat;shower chair   Fall history within last six months no   Activity/Exercise/Self-Care Comment Ind at baseline   Instrumental Activities of Daily Living (IADL)   Previous Responsibilities meal  none prep;housekeeping;laundry;shopping;yardwork;finances;driving   IADL Comments Pt. will have assistance with IADLs while recovering   General Information   Onset of Illness/Injury or Date of Surgery 10/02/23   Referring Physician Moraima Kramer PA-C   Patient/Family Therapy Goal Statement (OT) To go home   Additional Occupational Profile Info/Pertinent History of Current Problem R TKA   Existing Precautions/Restrictions no known precautions/restrictions   Right Lower Extremity (Weight-bearing Status) weight-bearing as tolerated (WBAT)   Cognitive Status Examination   Orientation Status orientation to person, place and time   Visual Perception   Visual Impairment/Limitations WNL   Sensory   Sensory Quick Adds sensation intact   Coordination   Upper Extremity Coordination No deficits were identified   Bed Mobility   Bed Mobility supine-sit   Assistive Device (Bed Mobility) bed rails   Comment (Bed Mobility) with Min A for RLE movement   Transfers   Transfers sit-stand transfer   Transfer Comments With FWW   Sit-Stand Transfer   Assistive Device (Sit-Stand Transfers) walker, standard   Sit/Stand Transfer Comments SBA   Activities of Daily Living   BADL Assessment/Intervention bathing;lower body dressing;toileting   Bathing Assessment/Intervention   [place] Level (Bathing) minimum assist (75% patient effort)   Lower Body Dressing Assessment/Training   [place] Level (Lower Body Dressing) moderate assist (50% patient effort)   Toileting   [place] Level (Toileting) modified independence   Clinical Impression   Criteria for Skilled Therapeutic Interventions Met (OT) Yes, treatment indicated   OT Diagnosis Impaired participation in ADLs   OT Problem List-Impairments impacting ADL pain   Assessment of Occupational Performance 1-3 Performance Deficits   Planned Therapy Interventions (OT) ADL retraining   Clinical Decision Making Complexity (OT) low complexity   Anticipated Equipment Needs Upon Discharge (OT)  maricruz;walker, standard   Risk & Benefits of therapy have been explained evaluation/treatment results reviewed   Clinical Impression Comments OT will assist with AE education and participation in ADLs   OT Total Evaluation Time   OT Eval, Low Complexity Minutes (06778) 8   Therapy Certification   Start of Care Date 10/02/23   Certification date from 10/02/23   Certification date to 10/04/23   Medical Diagnosis R TKA   OT Goals   Therapy Frequency (OT) One time eval and treatment   OT Predicted Duration/Target Date for Goal Attainment 10/04/23   OT Goals Lower Body Dressing;Lower Body Bathing;Toilet Transfer/Toileting   OT: Lower Body Dressing Modified independent;Goal Met   OT: Lower Body Bathing Modified independent;Goal Met   Interventions   Interventions Quick Adds Self-Care/Home Management   OT Discharge Planning   OT Plan dc   OT Discharge Recommendation (DC Rec)   (defer to ortho)   OT Rationale for DC Rec Pt. is moving well with FWW, has some equipment at home to assist with ADLs, he also has assistance from significant other while recovering   OT Brief overview of current status SBA with FWW   Total Session Time   Timed Code Treatment Minutes 30   Total Session Time (sum of timed and untimed services) 38

## 2023-10-04 NOTE — PROGRESS NOTES
No home care needs indicated at this time. RNCC available as needed.    Mary Carroll RN, BSN  Care Coordinator, 5 Ortho  Phone [phone]  Pager [phone]

## 2023-10-10 ASSESSMENT — ACTIVITIES OF DAILY LIVING (ADL)
WALK: ACTIVITY IS SOMEWHAT DIFFICULT
RAW_SCORE: 43.08
KNEE_ACTIVITY_OF_DAILY_LIVING_SUM: 40
GO UP STAIRS: ACTIVITY IS SOMEWHAT DIFFICULT
PAIN: THE SYMPTOM AFFECTS MY ACTIVITY SLIGHTLY
GO DOWN STAIRS: ACTIVITY IS SOMEWHAT DIFFICULT
GIVING WAY, BUCKLING OR SHIFTING OF KNEE: I DO NOT HAVE THE SYMPTOM
AS_A_RESULT_OF_YOUR_KNEE_INJURY,_HOW_WOULD_YOU_RATE_YOUR_CURRENT_LEVEL_OF_DAILY_ACTIVITY?: ABNORMAL
SWELLING: THE SYMPTOM AFFECTS MY ACTIVITY MODERATELY
HOW_WOULD_YOU_RATE_THE_OVERALL_FUNCTION_OF_YOUR_KNEE_DURING_YOUR_USUAL_DAILY_ACTIVITIES?: ABNORMAL
RISE FROM A CHAIR: ACTIVITY IS MINIMALLY DIFFICULT
HOW_WOULD_YOU_RATE_THE_CURRENT_FUNCTION_OF_YOUR_KNEE_DURING_YOUR_USUAL_DAILY_ACTIVITIES_ON_A_SCALE_FROM_0_TO_100_WITH_100_BEING_YOUR_LEVEL_OF_KNEE_FUNCTION_PRIOR_TO_YOUR_INJURY_AND_0_BEING_THE_INABILITY_TO_PERFORM_ANY_OF_YOUR_USUAL_DAILY_ACTIVITIES?: 50
KNEEL ON THE FRONT OF YOUR KNEE: I AM UNABLE TO DO THE ACTIVITY
STAND: ACTIVITY IS MINIMALLY DIFFICULT
WEAKNESS: I HAVE THE SYMPTOM BUT IT DOES NOT AFFECT MY ACTIVITY
SIT WITH YOUR KNEE BENT: ACTIVITY IS FAIRLY DIFFICULT
LIMPING: I HAVE THE SYMPTOM BUT IT DOES NOT AFFECT MY ACTIVITY
STIFFNESS: THE SYMPTOM AFFECTS MY ACTIVITY SLIGHTLY
KNEE_ACTIVITY_OF_DAILY_LIVING_SCORE: 61.54

## 2023-10-12 ENCOUNTER — THERAPY VISIT (OUTPATIENT)
Dept: PHYSICAL THERAPY | Facility: CLINIC | Age: 63
End: 2023-10-12
Payer: COMMERCIAL

## 2023-10-12 DIAGNOSIS — Z96.651 STATUS POST TOTAL RIGHT KNEE REPLACEMENT: ICD-10-CM

## 2023-10-12 PROCEDURE — 97110 THERAPEUTIC EXERCISES: CPT | Mod: GP | Performed by: PHYSICAL THERAPIST

## 2023-10-12 PROCEDURE — 97161 PT EVAL LOW COMPLEX 20 MIN: CPT | Mod: GP | Performed by: PHYSICAL THERAPIST

## 2023-10-12 NOTE — PROGRESS NOTES
PHYSICAL THERAPY EVALUATION  Type of Visit: Evaluation    See electronic medical record for Abuse and Falls Screening details.    Subjective  Pt is 9 days post op R TKA.  Pt states he has been doing the HEP and walking with walker.  Pt uses a shower chair and hand held shower head, grabber to get leg in/out of bed, shoe horn, and sock aid to help with ADLs.  Has fiance and daughter helping as needed as well.  He does live alone and at times needs to wait for someone to help him.  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        Presenting condition or subjective complaint: Knee replacement one week ago.  Date of onset: 10/02/23    Relevant medical history: High blood pressure; Overweight; Sleep disorder like apnea   Prior diagnostic imaging/testing results: MRI; CT scan; X-ray     Prior therapy history for the same diagnosis, illness or injury: No      Prior Level of Function  Transfers: Independent  Ambulation: Independent  ADL: Independent  IADL: Driving, Finances, Housekeeping, Laundry, Meal preparation, Medication management, Work, Yard work    Living Environment  Social support: Alone   Type of home: House   Stairs to enter the home: Yes 1 Is there a railing: No   Ramp: No   Stairs inside the home: Yes 8 Is there a railing: Yes   Help at home: Self Cares (home health aide/personal care attendant, family, etc); Home and Yard maintenance tasks; Assist for driving and community activities  Equipment owned: Four-point cane; Walker; Raised toilet seat; Bath bench; Dressing equipment     Employment: Yes self employed  Hobbies/Interests: offroad biking, fishing, camping, inventing    Patient goals for therapy: Get knee back to full function over time    Pain assessment: See objective evaluation for additional pain details     Objective   KNEE EVALUATION  PAIN: Pain Level at Rest: 2/10  Pain Level with Use: 8/10  INTEGUMENTARY (edema, incisions):  2+ pitting edema in the R lower leg around the ankle, more just tightness  and edema at the knee.  POSTURE: Standing Posture: Rounded shoulders, Forward head  Sitting Posture: Lordosis increased,    GAIT:  Weightbearing Status: WBAT  Assistive Device(s): Walker (standard)  Gait Deviations: Antalgic  Base of support increased  [place] decreased  Knee extension decreased R, Knee flexion decreased R  BALANCE/PROPRIOCEPTION:  Did not assess  ROM:   (Degrees) Left AROM Left PROM  Right AROM Right PROM   Knee Flexion   58 degs    Knee Extension   6 degs    Pain: Pain around the whole knee  End feel:     STRENGTH:   Pain: - none + mild ++ moderate +++ severe  Strength Scale: 0-5/5 Left Right   Knee Flexion 5 3   Knee Extension 5 3-   Quad Set 5 3-     FLEXIBILITY: Decreased hip IR R, Decreased hip ER R, Decreased quadriceps R, Decreased hamstrings R, Decreased gastroc R, Decreased soleus R  SPECIAL TESTS:  Not assessed as pt is post op.  PALPATION:  Tenderness along joint line.  JOINT MOBILITY:  Decreased patellar mobility on R due to excessive swelling.    Assessment & Plan   CLINICAL IMPRESSIONS  Medical Diagnosis: Status post total right knee replacement (Z96.651)  - Primary    Treatment Diagnosis: Decreased ROM, strength, and mobility.  Increased swelling.  Poor gait and balance.   Impression/Assessment: Patient is a 62 year old male 9 days post op R TKA.  The following significant findings have been identified: Pain, Decreased ROM/flexibility, Decreased joint mobility, Decreased strength, Impaired balance, Inflammation, Edema, and Impaired gait. These impairments interfere with their ability to perform self care tasks, work tasks, household chores, driving , household mobility, and community mobility as compared to previous level of function.     Clinical Decision Making (Complexity):  Clinical Presentation: Stable/Uncomplicated  Clinical Presentation Rationale: based on medical and personal factors listed in PT evaluation  Clinical Decision Making (Complexity): Low complexity    PLAN OF  CARE  Treatment Interventions:  Interventions: Gait Training, Manual Therapy, Neuromuscular Re-education, Therapeutic Activity, Therapeutic Exercise    Long Term Goals     PT Goal 1  Goal Identifier: #1  Goal Description: Pt will demonstrate full AROM of the R knee 0-115 degs in order to demonstrate ability to walk and complete stairs.  Rationale: to maximize safety and independence within the home;to maximize safety and independence with performance of ADLs and functional tasks  Target Date: 11/10/23  PT Goal 2  Goal Identifier: #2  Goal Description: Pt will demonstrate ability to negotiate stairs with reciprocal pattern in order to navigate the home.  Rationale: to maximize safety and independence within the home  Target Date: 11/24/23  PT Goal 3  Goal Identifier: #3  Goal Description: Pt will demonstrate ability to ambulate 200 ft without assistive device in order to be safe within the community and home.  Rationale: to maximize safety and independence within the home;to maximize safety and independence within the community  Target Date: 12/08/23  PT Goal 4  Goal Identifier: #4  Goal Description: Pt will demonstrate ability to perform all ADLs at home independently without use of equipment such as shoe horn.  Rationale: to maximize safety and independence with performance of ADLs and functional tasks  Target Date: 12/08/23      Frequency of Treatment: 2x/week  Duration of Treatment: 8 weeks    Education Assessment:   Learner/Method: Patient;Listening;No Barriers to Learning  Education Comments: HEP via PTRx, symptom management, edema management.    Risks and benefits of evaluation/treatment have been explained.   Patient/Family/caregiver agrees with Plan of Care.     Evaluation Time:     PT Eval, Low Complexity Minutes (32443): 30       Signing Clinician: Eloisa Garcia, PT      [facility]                                                                                    OUTPATIENT PHYSICAL THERAPY      PLAN OF TREATMENT FOR OUTPATIENT REHABILITATION   Patient's Last Name, First Name, Jeevan Yip YOB: 1960   Provider's Name   [facility]   Medical Record No.  [id number]     Onset Date: 10/02/23  Start of Care Date: 10/12/23     Medical Diagnosis:  Status post total right knee replacement (Z96.651)  - Primary      PT Treatment Diagnosis:  Decreased ROM, strength, and mobility.  Increased swelling.  Poor gait and balance. Plan of Treatment  Frequency/Duration: 2x/week/ 8 weeks    Certification date from 10/12/23 to 12/07/23         See note for plan of treatment details and functional goals     Eloisa Garcia, PT                         I CERTIFY THE NEED FOR THESE SERVICES FURNISHED UNDER        THIS PLAN OF TREATMENT AND WHILE UNDER MY CARE     (Physician attestation of this document indicates review and certification of the therapy plan).                Referring Provider:  Moraima Shay      Initial Assessment  See Epic Evaluation- Start of Care Date: 10/12/23

## 2023-10-13 PROBLEM — Z96.651 STATUS POST TOTAL RIGHT KNEE REPLACEMENT: Status: ACTIVE | Noted: 2023-10-13

## 2023-10-17 ENCOUNTER — ALLIED HEALTH/NURSE VISIT (OUTPATIENT)
Dept: NURSING | Facility: CLINIC | Age: 63
End: 2023-10-17
Payer: COMMERCIAL

## 2023-10-17 DIAGNOSIS — Z96.651 STATUS POST TOTAL RIGHT KNEE REPLACEMENT: Primary | ICD-10-CM

## 2023-10-17 PROCEDURE — 99207 PR NO CHARGE NURSE ONLY: CPT

## 2023-10-17 NOTE — PATIENT INSTRUCTIONS
"Continue anticoagulation plan of:  Aspirin 81mg twice daily until prescribed bottle is gone.      If incision is dry, OK to leave open to air (no bandage). If incision is draining, cover with gauze and call the office. If steri strips (tapes) applied, let fall off on their own. Please do not apply any ointments or lotions to incision. No soaking in tubs or pools for 3 months after surgery.    If any swelling in leg(s), elevate surgical leg above heart (lay flat, elevate leg on blankets or pillows). Continue to wear compression wraps during the day as long you are having leg swelling. May stop wearing or wear intermittently if not having swelling.       *Please call if a sharp increase in pain, fall, change in movement or sensation, chest pain, calf pain, shortness of breath, fevers greater than 101.4, redness around the incision sites.    *If needing refills on pain meds, please call clinic at least 3 days before you run out.    *Continue physical therapy as directed.  If needing orders for Outpatient Physical therapy, please call the clinic.    *Continue to use assistive device: cane or crutch until no limp. Discuss with therapist if questions.    *Dr. Davies advises no dental work or cleaning until 6 months after any surgery with implants to hips or knees unless emergent issue arises. This includes total joint surgeries. Once you are 6 months past your surgery, you will need prophylactic antibiotics for 2 years with any cleaning or dental work.  This is also for any other \"dirty\" procedures that you may have, such as a colonoscopy.    If you have any questions, call the clinic at [phone] and ask for Dr. Nicolas team to leave a message with.     ----------------------------------------------------------------------        Thanks for coming today.  [facility]  [address] [place] [address]    To schedule future appointments in Ortho Clinic, you may call [phone].    To " schedule ordered imaging or an injection ordered by your provider:  Call Central Imaging Injection scheduling line: [phone]    [facility] available online at:  [email or web address]    Please call if any further questions or concerns ([phone]).  Clinic hours 8 am to 5 pm.    Return to clinic (call) if symptoms worsen or fail to improve.

## 2023-10-17 NOTE — PROGRESS NOTES
Jeevan MALONE John comes into clinic today at the request of Dr. Davies for suture removal and wound check. The patient is status post RTKA on 10/2/23.     Incision clean, dry and intact.  Steri-strips removed. Incision cleaned. Pt instructed on wound care and symptoms of infection to watch for.     Discussed anticoagulation. Pt is currently taking Aspirin. Pt advised against any NSAIDs while on any anticoagulation med (ASA, Lovenox,or Coumadin).      Discussed current pain medication regime. Pt currently taking Oxycodone 1 tab at night and ibuprofen 3 tabs during the night.     Discussed current physical therapy program. Pt is [facility] 1-2 times a week. Current ROM 3 degrees-80 degrees.     Patient using walker to aid in ambulation, he is progressing to using a cane.     Discussed edema. Patient has moderate edema at knee and below knee. Using ace wraps to help with edema and working on elevation. Icing frequently.     Reviewed knee precautions.    Total time spent with Pt: 30 minutes.    Génesis Escoto RN

## 2023-10-19 ENCOUNTER — THERAPY VISIT (OUTPATIENT)
Dept: PHYSICAL THERAPY | Facility: CLINIC | Age: 63
End: 2023-10-19
Attending: ORTHOPAEDIC SURGERY
Payer: COMMERCIAL

## 2023-10-19 DIAGNOSIS — Z96.651 STATUS POST TOTAL RIGHT KNEE REPLACEMENT: Primary | ICD-10-CM

## 2023-10-19 PROCEDURE — 97116 GAIT TRAINING THERAPY: CPT | Mod: GP | Performed by: PHYSICAL THERAPIST

## 2023-10-19 PROCEDURE — 97110 THERAPEUTIC EXERCISES: CPT | Mod: GP | Performed by: PHYSICAL THERAPIST

## 2023-10-26 ENCOUNTER — THERAPY VISIT (OUTPATIENT)
Dept: PHYSICAL THERAPY | Facility: CLINIC | Age: 63
End: 2023-10-26
Attending: ORTHOPAEDIC SURGERY
Payer: COMMERCIAL

## 2023-10-26 DIAGNOSIS — Z96.651 STATUS POST TOTAL RIGHT KNEE REPLACEMENT: Primary | ICD-10-CM

## 2023-10-26 PROCEDURE — 97110 THERAPEUTIC EXERCISES: CPT | Mod: GP | Performed by: PHYSICAL THERAPIST

## 2023-10-30 ENCOUNTER — THERAPY VISIT (OUTPATIENT)
Dept: PHYSICAL THERAPY | Facility: CLINIC | Age: 63
End: 2023-10-30
Payer: COMMERCIAL

## 2023-10-30 DIAGNOSIS — Z96.651 STATUS POST TOTAL RIGHT KNEE REPLACEMENT: Primary | ICD-10-CM

## 2023-10-30 PROCEDURE — 97140 MANUAL THERAPY 1/> REGIONS: CPT | Mod: GP | Performed by: PHYSICAL THERAPIST

## 2023-10-30 PROCEDURE — 97110 THERAPEUTIC EXERCISES: CPT | Mod: GP | Performed by: PHYSICAL THERAPIST

## 2023-11-01 DIAGNOSIS — Z96.651 STATUS POST TOTAL RIGHT KNEE REPLACEMENT: Primary | ICD-10-CM

## 2023-11-02 ENCOUNTER — THERAPY VISIT (OUTPATIENT)
Dept: PHYSICAL THERAPY | Facility: CLINIC | Age: 63
End: 2023-11-02
Payer: COMMERCIAL

## 2023-11-02 DIAGNOSIS — Z96.651 STATUS POST TOTAL RIGHT KNEE REPLACEMENT: Primary | ICD-10-CM

## 2023-11-02 PROCEDURE — 97110 THERAPEUTIC EXERCISES: CPT | Mod: GP | Performed by: PHYSICAL THERAPIST

## 2023-11-02 PROCEDURE — 97014 ELECTRIC STIMULATION THERAPY: CPT | Mod: GP | Performed by: PHYSICAL THERAPIST

## 2023-11-06 ENCOUNTER — THERAPY VISIT (OUTPATIENT)
Dept: PHYSICAL THERAPY | Facility: CLINIC | Age: 63
End: 2023-11-06
Payer: COMMERCIAL

## 2023-11-06 DIAGNOSIS — Z96.651 STATUS POST TOTAL RIGHT KNEE REPLACEMENT: Primary | ICD-10-CM

## 2023-11-06 PROCEDURE — 97110 THERAPEUTIC EXERCISES: CPT | Mod: GP | Performed by: PHYSICAL THERAPIST

## 2023-11-09 ENCOUNTER — THERAPY VISIT (OUTPATIENT)
Dept: PHYSICAL THERAPY | Facility: CLINIC | Age: 63
End: 2023-11-09
Payer: COMMERCIAL

## 2023-11-09 DIAGNOSIS — Z96.651 STATUS POST TOTAL RIGHT KNEE REPLACEMENT: Primary | ICD-10-CM

## 2023-11-09 PROCEDURE — 97116 GAIT TRAINING THERAPY: CPT | Mod: GP | Performed by: PHYSICAL THERAPIST

## 2023-11-09 PROCEDURE — 97110 THERAPEUTIC EXERCISES: CPT | Mod: GP | Performed by: PHYSICAL THERAPIST

## 2023-11-09 PROCEDURE — 97140 MANUAL THERAPY 1/> REGIONS: CPT | Mod: GP | Performed by: PHYSICAL THERAPIST

## 2023-11-13 ENCOUNTER — THERAPY VISIT (OUTPATIENT)
Dept: PHYSICAL THERAPY | Facility: CLINIC | Age: 63
End: 2023-11-13
Payer: COMMERCIAL

## 2023-11-13 DIAGNOSIS — Z96.651 STATUS POST TOTAL RIGHT KNEE REPLACEMENT: Primary | ICD-10-CM

## 2023-11-13 PROCEDURE — 97110 THERAPEUTIC EXERCISES: CPT | Mod: GP | Performed by: PHYSICAL THERAPIST

## 2023-11-13 PROCEDURE — 97140 MANUAL THERAPY 1/> REGIONS: CPT | Mod: GP | Performed by: PHYSICAL THERAPIST

## 2023-11-14 ENCOUNTER — OFFICE VISIT (OUTPATIENT)
Dept: ORTHOPEDICS | Facility: CLINIC | Age: 63
End: 2023-11-14
Payer: COMMERCIAL

## 2023-11-14 ENCOUNTER — ANCILLARY PROCEDURE (OUTPATIENT)
Dept: GENERAL RADIOLOGY | Facility: CLINIC | Age: 63
End: 2023-11-14
Attending: ORTHOPAEDIC SURGERY
Payer: COMMERCIAL

## 2023-11-14 DIAGNOSIS — Z96.651 STATUS POST TOTAL RIGHT KNEE REPLACEMENT: Primary | ICD-10-CM

## 2023-11-14 DIAGNOSIS — Z96.651 STATUS POST TOTAL RIGHT KNEE REPLACEMENT: ICD-10-CM

## 2023-11-14 PROCEDURE — 99024 POSTOP FOLLOW-UP VISIT: CPT | Performed by: ORTHOPAEDIC SURGERY

## 2023-11-14 PROCEDURE — 73562 X-RAY EXAM OF KNEE 3: CPT | Mod: RT | Performed by: RADIOLOGY

## 2023-11-14 NOTE — NURSING NOTE
Jeevan Lopez's chief complaint for this visit includes:  Chief Complaint   Patient presents with    Surgical Followup     6 week follow up RKTA 10/2/23,       Referring Provider:  No referring provider defined for this encounter.    There were no vitals taken for this visit.  Data Unavailable   Global Mental Health Score: (P) 14  Global Physical Health Score: (P) 15  PROMIS TOTAL - SUBSCORES: (P) 29  UCLA: 5-6  KOOS Jr.  68.28    Pain increases with: Long periods of walking/standing, increased activity  Previous surgeries: RKTA 10/2/23  Previous injections within last 6 months: NO  Treatments done: PT 10x, ice 6x/day, massage knee/leg, knee sleeve  Imaging completed: 11/14/23  Pain: 3/10  Concerns: Swelling    Marcelina Negro, ATC

## 2023-11-14 NOTE — PROGRESS NOTES
Chief Complaint: Surgical Followup (6 week follow up RKTA 10/2/23,)         HPI: Jeevan Lopez returns today in follow-up for his right knee. He reports that he si doing very well. He is not using pain medicaiton, no assit device, doing well in phystial therapy, back at work. ROM in PT has been 1-110. Happy with how he is doing so far.     Medications and allergies are documented in the EMR and have been reviewed.      Current Outpatient Medications:     acetaminophen (TYLENOL) 325 MG tablet, Take 2 tablets (650 mg) by mouth every 4 hours as needed for other (For optimal non-opioid multimodal pain management to improve pain control.), Disp: 100 tablet, Rfl: 0    amLODIPine (NORVASC) 10 MG tablet, Take 1 tablet (10 mg) by mouth daily, Disp: 90 tablet, Rfl: 3    cyanocobalamin (VITAMIN B-12) 1000 MCG tablet, Take by mouth daily, Disp: , Rfl:     omeprazole 20 MG tablet, Take 1 tablet (20 mg) by mouth daily, Disp: 90 tablet, Rfl: 4    oxyCODONE (ROXICODONE) 5 MG tablet, Take 1-2 tablets (5-10 mg) by mouth every 4 hours as needed for moderate pain, Disp: 33 tablet, Rfl: 0    Vitamin A 7.5 MG (41012 UT) CAPS, Take 1 capsule by mouth daily, Disp: , Rfl:     vitamin D3 (CHOLECALCIFEROL) 250 mcg (44987 units) capsule, Take 1 capsule by mouth daily, Disp: , Rfl:     Allergies: Lisinopril    Current Status:  KOOS Jr: 68.28  UCLA: 5-6  Global Mental Health Score - (P) 14  Global Physical Health Score - (P) 15  PROMIS TOTAL - SUBSCORES - (P) 29    Physical Exam:  On physical examination the patient appears the stated age, is in no acute distress, affect is appropriate, and breathing is non-labored.    There were no vitals taken for this visit.  There is no height or weight on file to calculate BMI.    Rises from chair:  Gait:   Appearance: benign  Clinical alignment: neutral   Effusion: no   Tenderness to palpation:min  Extension:3  Flexion: 110  Patellar tracking:   Collateral ligaments: intact    Cruciate ligaments: grossly  intact     Stable in in the sagittal plane in mid-flexion.    Distally, the circulatory, motor, and sensation exam is intact with 5/5 EHL, gastroc-soleus, and tibialis anterior.  Sensation to light touch is intact.  Dorsalis pedis and posterior tibialis pulses are palpable.  There are no sores on the feet, no bruising, and no lymphedema.    Assessment: doing well     Plan: cont with PT and RTC in 6 weeks, sooner if issues.   No ref. provider found

## 2023-11-14 NOTE — LETTER
11/14/2023         RE: Jeevan Lopez  [address] [facility] [address]        Dear Colleague,    Thank you for referring your patient, Jeevan Lopez, to the [facility]. Please see a copy of my visit note below.    Chief Complaint: Surgical Followup (6 week follow up RKTA 10/2/23,)         HPI: Jeevan Lopez returns today in follow-up for his right knee. He reports that he si doing very well. He is not using pain medicaiton, no assit device, doing well in phystial therapy, back at work. ROM in PT has been 1-110. Happy with how he is doing so far.     Medications and allergies are documented in the EMR and have been reviewed.      Current Outpatient Medications:      acetaminophen (TYLENOL) 325 MG tablet, Take 2 tablets (650 mg) by mouth every 4 hours as needed for other (For optimal non-opioid multimodal pain management to improve pain control.), Disp: 100 tablet, Rfl: 0     amLODIPine (NORVASC) 10 MG tablet, Take 1 tablet (10 mg) by mouth daily, Disp: 90 tablet, Rfl: 3     cyanocobalamin (VITAMIN B-12) 1000 MCG tablet, Take by mouth daily, Disp: , Rfl:      omeprazole 20 MG tablet, Take 1 tablet (20 mg) by mouth daily, Disp: 90 tablet, Rfl: 4     oxyCODONE (ROXICODONE) 5 MG tablet, Take 1-2 tablets (5-10 mg) by mouth every 4 hours as needed for moderate pain, Disp: 33 tablet, Rfl: 0     Vitamin A 7.5 MG (81752 UT) CAPS, Take 1 capsule by mouth daily, Disp: , Rfl:      vitamin D3 (CHOLECALCIFEROL) 250 mcg (55887 units) capsule, Take 1 capsule by mouth daily, Disp: , Rfl:     Allergies: Lisinopril    Current Status:  KOOS Jr: 68.28  UCLA: 5-6  Global Mental Health Score - (P) 14  Global Physical Health Score - (P) 15  PROMIS TOTAL - SUBSCORES - (P) 29    Physical Exam:  On physical examination the patient appears the stated age, is in no acute distress, affect is appropriate, and breathing is non-labored.    There were no vitals taken for this visit.  There is no height or weight  on file to calculate BMI.    Rises from chair:  Gait:   Appearance: benign  Clinical alignment: neutral   Effusion: no   Tenderness to palpation:min  Extension:3  Flexion: 110  Patellar tracking:   Collateral ligaments: intact    Cruciate ligaments: grossly intact     Stable in in the sagittal plane in mid-flexion.    Distally, the circulatory, motor, and sensation exam is intact with 5/5 EHL, gastroc-soleus, and tibialis anterior.  Sensation to light touch is intact.  Dorsalis pedis and posterior tibialis pulses are palpable.  There are no sores on the feet, no bruising, and no lymphedema.    Assessment: doing well     Plan: cont with PT and RTC in 6 weeks, sooner if issues.   No ref. provider found      Again, thank you for allowing me to participate in the care of your patient.        Sincerely,        Darian Davies MD

## 2023-11-20 ENCOUNTER — VIRTUAL VISIT (OUTPATIENT)
Dept: FAMILY MEDICINE | Facility: OTHER | Age: 63
End: 2023-11-20
Payer: COMMERCIAL

## 2023-11-20 DIAGNOSIS — R20.2 NUMBNESS AND TINGLING IN LEFT HAND: Primary | ICD-10-CM

## 2023-11-20 DIAGNOSIS — R20.0 NUMBNESS AND TINGLING IN LEFT HAND: Primary | ICD-10-CM

## 2023-11-20 PROCEDURE — 99213 OFFICE O/P EST LOW 20 MIN: CPT | Mod: VID | Performed by: PHYSICIAN ASSISTANT

## 2023-11-20 ASSESSMENT — ENCOUNTER SYMPTOMS: NUMBNESS: 1

## 2023-11-20 NOTE — PROGRESS NOTES
"    Instructions Relayed to Patient by Virtual Roomer:     Patient is active on [employer]:   Relayed following to patient: \"It looks like you are active on [employer], are you able to join the visit this way? If not, do you need us to send you a link now or would you like your provider to send a link via text or email when they are ready to initiate the visit?\"    Reminded patient to ensure they were logged on to virtual visit by arrival time listed. Documented in appointment notes if patient had flexibility to initiate visit sooner than arrival time. If pediatric virtual visit, ensured pediatric patient along with parent/guardian will be present for video visit.     Patient offered the website [email or web address] and/or phone number to Life[employer]ribe Help line: [phone]    Jeevan is a 63 year old who is being evaluated via a billable video visit.      How would you like to obtain your AVS? tab ticketbroker  If the video visit is dropped, the invitation should be resent by: Text to cell phone: [phone]  Will anyone else be joining your video visit? No        Assessment & Plan     Numbness and tingling in left hand  Suspect compression of the ulnar nerve possibly due to compression when he was positioned for the surgery of his knee.  This may improve with time but NSAIDs haven't helped. Recommended EMG to identify location.  In the meantime physical therapy to see if they can help relieve some pressure and recommended ICE and avoiding bending or resting on the elbow.  Also referred to Ortho to see if they agree it is this area. Concerned that the EMGs may be scheduled out too far so don't want to wait on seeing specialist for next steps.   - Orthopedic  Referral; Future  - EMG; Future  - Physical Therapy Referral; Future    Options for treatment and follow-up care were reviewed with the patient and/or guardian. Patient and/or guardian engaged in the decision making process and verbalized understanding of " the options discussed and agreed with the final plan.     YASMEEN Carlin [facility] TANA Chew is a 63 year old, presenting for the following health issues:  No chief complaint on file.        11/20/2023    10:11 AM   Additional Questions   Roomed by elizabeth   Accompanied by self       Numbness  Associated symptoms include numbness.   History of Present Illness       Reason for visit:  Numbness in the left pinky and ring finger upon waking from a knee replacement surgery.  referral to a Neurologist  Symptom onset:  More than a month  Symptoms include:  Numbness in pinky and ring finger of left hand  Symptom intensity:  Moderate  Symptom progression:  Staying the same  Had these symptoms before:  No  What makes it worse:  No  What makes it better:  No    He eats 0-1 servings of fruits and vegetables daily.He consumes 2 sweetened beverage(s) daily.He exercises with enough effort to increase his heart rate 20 to 29 minutes per day.  He exercises with enough effort to increase his heart rate 3 or less days per week. He is missing 1 dose(s) of medications per week.  He is not taking prescribed medications regularly due to remembering to take.         Had total knee replacement done but aft ersurgery he had numbness in the left pinky and side of the ring finger. He didn't think much of it due to pain from the surgery.   Today it has been 7 weeks.   No pain so far  It hasn't improved but hasn't gotten worse  Did mention it to Ortho when he did follow-up but they didn't say much and said check back at next visit which is in over a month.   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    No weakness so far.    He was on NSAIDs after surgery and just recently stopped them but didn't notice any difference on or off them for the symptoms.       Review of Systems   Neurological:  Positive for numbness.      Constitutional,  musculoskeletal, neuro, skin  systems are negative, except as otherwise noted.      Objective           Vitals:  No vitals were obtained today due to virtual visit.    Physical Exam   GENERAL: Healthy, alert and no distress  EYES: Eyes grossly normal to inspection.  No discharge or erythema, or obvious scleral/conjunctival abnormalities.  RESP: No audible wheeze, cough, or visible cyanosis.  No visible retractions or increased work of breathing.    SKIN: Visible skin clear. No significant rash, abnormal pigmentation or lesions.  NEURO: Cranial nerves grossly intact.  Mentation and speech appropriate for age.  PSYCH: Mentation appears normal, affect normal/bright, judgement and insight intact, normal speech and appearance well-groomed.        Video-Visit Details    Type of service:  Video Visit   Video Start Time: 10:51 AM  Video End Time:11:04 AM    Originating Location (pt. Location): Home    Distant Location (provider location):  Off-site  Platform used for Video Visit: Clickability

## 2023-11-21 ENCOUNTER — THERAPY VISIT (OUTPATIENT)
Dept: PHYSICAL THERAPY | Facility: CLINIC | Age: 63
End: 2023-11-21
Payer: COMMERCIAL

## 2023-11-21 DIAGNOSIS — Z96.651 STATUS POST TOTAL RIGHT KNEE REPLACEMENT: Primary | ICD-10-CM

## 2023-11-21 PROCEDURE — 97140 MANUAL THERAPY 1/> REGIONS: CPT | Mod: GP | Performed by: PHYSICAL THERAPIST

## 2023-11-21 PROCEDURE — 97110 THERAPEUTIC EXERCISES: CPT | Mod: GP | Performed by: PHYSICAL THERAPIST

## 2023-11-27 ENCOUNTER — THERAPY VISIT (OUTPATIENT)
Dept: PHYSICAL THERAPY | Facility: CLINIC | Age: 63
End: 2023-11-27
Payer: COMMERCIAL

## 2023-11-27 DIAGNOSIS — Z96.651 STATUS POST TOTAL RIGHT KNEE REPLACEMENT: Primary | ICD-10-CM

## 2023-11-27 PROCEDURE — 97110 THERAPEUTIC EXERCISES: CPT | Mod: GP | Performed by: PHYSICAL THERAPIST

## 2023-11-27 PROCEDURE — 97140 MANUAL THERAPY 1/> REGIONS: CPT | Mod: GP | Performed by: PHYSICAL THERAPIST

## 2023-11-29 ENCOUNTER — ANCILLARY PROCEDURE (OUTPATIENT)
Dept: GENERAL RADIOLOGY | Facility: CLINIC | Age: 63
End: 2023-11-29
Attending: ORTHOPAEDIC SURGERY
Payer: COMMERCIAL

## 2023-11-29 ENCOUNTER — OFFICE VISIT (OUTPATIENT)
Dept: ORTHOPEDICS | Facility: CLINIC | Age: 63
End: 2023-11-29
Payer: COMMERCIAL

## 2023-11-29 VITALS
SYSTOLIC BLOOD PRESSURE: 152 MMHG | HEIGHT: 69 IN | BODY MASS INDEX: 39.1 KG/M2 | TEMPERATURE: 97.2 F | WEIGHT: 264 LBS | DIASTOLIC BLOOD PRESSURE: 92 MMHG

## 2023-11-29 DIAGNOSIS — M25.532 LEFT WRIST PAIN: ICD-10-CM

## 2023-11-29 DIAGNOSIS — M25.532 LEFT WRIST PAIN: Primary | ICD-10-CM

## 2023-11-29 LAB
CRP SERPL-MCNC: 7.05 MG/L
D DIMER PPP FEU-MCNC: 2.21 UG/ML FEU (ref 0–0.5)
ERYTHROCYTE [SEDIMENTATION RATE] IN BLOOD BY WESTERGREN METHOD: 8 MM/HR (ref 0–20)
RHEUMATOID FACT SERPL-ACNC: <10 IU/ML
URATE SERPL-MCNC: 6.2 MG/DL (ref 3.4–7)
VIT D+METAB SERPL-MCNC: 42 NG/ML (ref 20–50)

## 2023-11-29 PROCEDURE — 86200 CCP ANTIBODY: CPT | Performed by: ORTHOPAEDIC SURGERY

## 2023-11-29 PROCEDURE — 84550 ASSAY OF BLOOD/URIC ACID: CPT | Performed by: ORTHOPAEDIC SURGERY

## 2023-11-29 PROCEDURE — 86140 C-REACTIVE PROTEIN: CPT | Performed by: ORTHOPAEDIC SURGERY

## 2023-11-29 PROCEDURE — 73110 X-RAY EXAM OF WRIST: CPT | Mod: TC | Performed by: RADIOLOGY

## 2023-11-29 PROCEDURE — 86431 RHEUMATOID FACTOR QUANT: CPT | Performed by: ORTHOPAEDIC SURGERY

## 2023-11-29 PROCEDURE — 86038 ANTINUCLEAR ANTIBODIES: CPT | Performed by: ORTHOPAEDIC SURGERY

## 2023-11-29 PROCEDURE — 36415 COLL VENOUS BLD VENIPUNCTURE: CPT | Performed by: ORTHOPAEDIC SURGERY

## 2023-11-29 PROCEDURE — 85652 RBC SED RATE AUTOMATED: CPT | Performed by: ORTHOPAEDIC SURGERY

## 2023-11-29 PROCEDURE — 85379 FIBRIN DEGRADATION QUANT: CPT | Performed by: ORTHOPAEDIC SURGERY

## 2023-11-29 PROCEDURE — 82306 VITAMIN D 25 HYDROXY: CPT | Performed by: ORTHOPAEDIC SURGERY

## 2023-11-29 PROCEDURE — 99024 POSTOP FOLLOW-UP VISIT: CPT | Performed by: ORTHOPAEDIC SURGERY

## 2023-11-29 PROCEDURE — 99213 OFFICE O/P EST LOW 20 MIN: CPT | Mod: 24 | Performed by: ORTHOPAEDIC SURGERY

## 2023-11-29 PROCEDURE — 86225 DNA ANTIBODY NATIVE: CPT | Performed by: ORTHOPAEDIC SURGERY

## 2023-11-29 ASSESSMENT — PAIN SCALES - GENERAL: PAINLEVEL: NO PAIN (0)

## 2023-11-29 NOTE — PROGRESS NOTES
I have faxed the EMG to [facility] at [phone]  Dajuan/WILL
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
## 2023-11-30 LAB
ANA SER QL IF: NEGATIVE
CCP AB SER IA-ACNC: 1.7 U/ML
DSDNA AB SER-ACNC: 12 IU/ML

## 2023-12-07 ENCOUNTER — VIRTUAL VISIT (OUTPATIENT)
Dept: ORTHOPEDICS | Facility: CLINIC | Age: 63
End: 2023-12-07
Payer: COMMERCIAL

## 2023-12-07 VITALS — WEIGHT: 264 LBS | BODY MASS INDEX: 39.1 KG/M2 | HEIGHT: 69 IN

## 2023-12-07 DIAGNOSIS — Z96.651 STATUS POST TOTAL RIGHT KNEE REPLACEMENT: Primary | ICD-10-CM

## 2023-12-07 PROCEDURE — 99024 POSTOP FOLLOW-UP VISIT: CPT | Mod: 93 | Performed by: ORTHOPAEDIC SURGERY

## 2023-12-07 NOTE — NURSING NOTE
"Reason For Visit:   Chief Complaint   Patient presents with    RECHECK     Discuss imaging and recent labs       Ht 1.753 m (5' 9\")   Wt 119.7 kg (264 lb)   BMI 38.99 kg/m           Ruslan Conway, EMT    "

## 2023-12-07 NOTE — PROGRESS NOTES
I spoke with the patient today regarding his knee. He reports that he is doing well. He is using an exercise bike for fitness, the knee continues to improve. Using OTC pain medication when needed. But also very concerned about the radiology read that showed a small, stable eara at the very periphery of the baseplate without cement. He also had severeal inflammatory markers ordered by an outside physician at a visit for his hand and these have not completely normalized post-op. He denies consitutional symptoms. We reviewed these issues and he is still worried so I offered him an in person visit where we can repeat his radiograph and his laboratory studies. He was happy with that plan. All the patient's questions were answered to the best of my ability.   Assessment: doing well post-op total knee. Concerns about what appear to be benign findings and we are going to repeat these to make sure nothing is going to be missed.  Plan: as above.   20 minutes were spent on the phone visit today

## 2023-12-07 NOTE — LETTER
12/7/2023         RE: Jeevan Lopez  [address] [facility] [address]        Dear Colleague,    Thank you for referring your patient, Jeevan Lopez, to the [facility] [place]. Please see a copy of my visit note below.    I spoke with the patient today regarding his knee. He reports that he is doing well. He is using an exercise bike for fitness, the knee continues to improve. Using OTC pain medication when needed. But also very concerned about the radiology read that showed a small, stable eara at the very periphery of the baseplate without cement. He also had severeal inflammatory markers ordered by an outside physician at a visit for his hand and these have not completely normalized post-op. He denies consitutional symptoms. We reviewed these issues and he is still worried so I offered him an in person visit where we can repeat his radiograph and his laboratory studies. He was happy with that plan. All the patient's questions were answered to the best of my ability.   Assessment: doing well post-op total knee. Concerns about what appear to be benign findings and we are going to repeat these to make sure nothing is going to be missed.  Plan: as above.   20 minutes were spent on the phone visit today        Darian Davies MD

## 2023-12-14 ENCOUNTER — ANCILLARY PROCEDURE (OUTPATIENT)
Dept: GENERAL RADIOLOGY | Facility: CLINIC | Age: 63
End: 2023-12-14
Attending: ORTHOPAEDIC SURGERY
Payer: COMMERCIAL

## 2023-12-14 ENCOUNTER — OFFICE VISIT (OUTPATIENT)
Dept: ORTHOPEDICS | Facility: CLINIC | Age: 63
End: 2023-12-14
Payer: COMMERCIAL

## 2023-12-14 DIAGNOSIS — Z96.651 STATUS POST TOTAL RIGHT KNEE REPLACEMENT: ICD-10-CM

## 2023-12-14 DIAGNOSIS — Z96.651 STATUS POST TOTAL RIGHT KNEE REPLACEMENT: Primary | ICD-10-CM

## 2023-12-14 PROCEDURE — 99024 POSTOP FOLLOW-UP VISIT: CPT | Performed by: ORTHOPAEDIC SURGERY

## 2023-12-14 PROCEDURE — 73562 X-RAY EXAM OF KNEE 3: CPT | Mod: RT | Performed by: RADIOLOGY

## 2023-12-14 NOTE — LETTER
12/14/2023         RE: Jeevan Lopez  [address] [facility] [address]        Dear Colleague,    Thank you for referring your patient, Jeevan Lopez, to the [facility] [place]. Please see a copy of my visit note below.    Chief Complaint: RECHECK (Recheck for knee imaging, follow-up from last weeks discussion of Imaging and labs )         HPI: Jeevan Lopez returns today in follow-up for his right total knee.  He is now 10 weeks out.  He reports that things are going well.  Has been discharged from physical therapy.  His range of motion there was last documented as 3-1 10 done on 11/27/2023.  He reports he is using an exercise bicycle at home and that last week he was riding his fat tire bike outdoors for about 3 miles.  This went well.  At his visit for his finger numbness he had infection labs drawn it is not clear why these were drawn.  He was not having any of the stigmata generally associated with a postoperative infection.  These all came back reassuringly low in the range that would be considered normal for being in the first 2 months after the surgery.  Since that time he has had no increase in his pain only increase in his function.  He also has had several x-rays done.  There is a small lucency medially on the cement mantle and there is no indication of a loose implant.  Radiographs posteriorly showing a very stable appearing implant.  He is here today for his roughly 12-week follow-up and has asked to get repeat x-ray to make sure that there is not any progression or signs of loosening of the tibia.    Medications and allergies are documented in the EMR and have been reviewed.      Current Outpatient Medications:     amLODIPine (NORVASC) 10 MG tablet, Take 1 tablet (10 mg) by mouth daily, Disp: 90 tablet, Rfl: 3    cyanocobalamin (VITAMIN B-12) 1000 MCG tablet, Take by mouth daily, Disp: , Rfl:     omeprazole 20 MG tablet, Take 1 tablet (20 mg) by mouth daily, Disp:  90 tablet, Rfl: 4    Vitamin A 7.5 MG (20817 UT) CAPS, Take 1 capsule by mouth daily, Disp: , Rfl:     vitamin D3 (CHOLECALCIFEROL) 250 mcg (47588 units) capsule, Take 1 capsule by mouth daily, Disp: , Rfl:     Allergies: Lisinopril      Physical Exam:  On physical examination the patient appears the stated age, is in no acute distress, affect is appropriate, and breathing is non-labored.    There were no vitals taken for this visit.  There is no height or weight on file to calculate BMI.    Rises from chair: Easily  Gait: Normal  Appearance: benign  Clinical alignment: Neutral  Effusion: No  Incision is healed and benign  Tenderness to palpation: No  Extension: 3  Flexion: 110  Patellar tracking: Normal  Collateral ligaments: intact  Stable in in the sagittal plane in mid-flexion.    Distally, the circulatory, motor, and sensation exam is intact with 5/5 EHL, gastroc-soleus, and tibialis anterior.  Sensation to light touch is intact.  Dorsalis pedis and posterior tibialis pulses are palpable.  There are no sores on the feet, no bruising, and no lymphedema.    We reviewed the radiographs together. These show the normal progression for a total knee arthroplasty without evidence of loosening or subsidence.     Assessment and plan: Stable implant improving clinically.  Range of motion is at within goal though he will continue to need to exercise this as he is hoping to be higher demand on his knee, spring with bike riding.  We discussed use of an exercise bike and decreasing the seat height over time.  In terms of infection labs his numbers were reassuring.  I offered to repeat these so that we could see they have gone down to normal for nonoperative knee.  Alexander is not interested in repeating these at this time and that is a reasonable decision.  I would like to see him back a year from the surgery.  I would also like to see him back sooner if there are any issues.  I thanked Alexander for coming in today all his questions  were answered to the best my ability.    No ref. provider found          Darian Davies MD

## 2023-12-14 NOTE — NURSING NOTE
Reason For Visit:   Chief Complaint   Patient presents with    RECHECK     Recheck for knee imaging, follow-up from last weeks discussion of Imaging and labs        There were no vitals taken for this visit.         Ruslan Cownay, EMT

## 2023-12-14 NOTE — PROGRESS NOTES
Chief Complaint: RECHECK (Recheck for knee imaging, follow-up from last weeks discussion of Imaging and labs )         HPI: Jeevan Lopez returns today in follow-up for his right total knee.  He is now 10 weeks out.  He reports that things are going well.  Has been discharged from physical therapy.  His range of motion there was last documented as 3-1 10 done on 11/27/2023.  He reports he is using an exercise bicycle at home and that last week he was riding his fat tire bike outdoors for about 3 miles.  This went well.  At his visit for his finger numbness he had infection labs drawn it is not clear why these were drawn.  He was not having any of the stigmata generally associated with a postoperative infection.  These all came back reassuringly low in the range that would be considered normal for being in the first 2 months after the surgery.  Since that time he has had no increase in his pain only increase in his function.  He also has had several x-rays done.  There is a small lucency medially on the cement mantle and there is no indication of a loose implant.  Radiographs posteriorly showing a very stable appearing implant.  He is here today for his roughly 12-week follow-up and has asked to get repeat x-ray to make sure that there is not any progression or signs of loosening of the tibia.    Medications and allergies are documented in the EMR and have been reviewed.      Current Outpatient Medications:     amLODIPine (NORVASC) 10 MG tablet, Take 1 tablet (10 mg) by mouth daily, Disp: 90 tablet, Rfl: 3    cyanocobalamin (VITAMIN B-12) 1000 MCG tablet, Take by mouth daily, Disp: , Rfl:     omeprazole 20 MG tablet, Take 1 tablet (20 mg) by mouth daily, Disp: 90 tablet, Rfl: 4    Vitamin A 7.5 MG (78741 UT) CAPS, Take 1 capsule by mouth daily, Disp: , Rfl:     vitamin D3 (CHOLECALCIFEROL) 250 mcg (75124 units) capsule, Take 1 capsule by mouth daily, Disp: , Rfl:     Allergies: Lisinopril      Physical Exam:  On  physical examination the patient appears the stated age, is in no acute distress, affect is appropriate, and breathing is non-labored.    There were no vitals taken for this visit.  There is no height or weight on file to calculate BMI.    Rises from chair: Easily  Gait: Normal  Appearance: benign  Clinical alignment: Neutral  Effusion: No  Incision is healed and benign  Tenderness to palpation: No  Extension: 3  Flexion: 110  Patellar tracking: Normal  Collateral ligaments: intact  Stable in in the sagittal plane in mid-flexion.    Distally, the circulatory, motor, and sensation exam is intact with 5/5 EHL, gastroc-soleus, and tibialis anterior.  Sensation to light touch is intact.  Dorsalis pedis and posterior tibialis pulses are palpable.  There are no sores on the feet, no bruising, and no lymphedema.    We reviewed the radiographs together. These show the normal progression for a total knee arthroplasty without evidence of loosening or subsidence.     Assessment and plan: Stable implant improving clinically.  Range of motion is at within goal though he will continue to need to exercise this as he is hoping to be higher demand on his knee, spring with bike riding.  We discussed use of an exercise bike and decreasing the seat height over time.  In terms of infection labs his numbers were reassuring.  I offered to repeat these so that we could see they have gone down to normal for nonoperative knee.  Alexander is not interested in repeating these at this time and that is a reasonable decision.  I would like to see him back a year from the surgery.  I would also like to see him back sooner if there are any issues.  I thanked Alexander for coming in today all his questions were answered to the best my ability.    No ref. provider found

## 2024-02-06 ENCOUNTER — TRANSFERRED RECORDS (OUTPATIENT)
Dept: HEALTH INFORMATION MANAGEMENT | Facility: CLINIC | Age: 64
End: 2024-02-06
Payer: COMMERCIAL

## 2024-02-08 ENCOUNTER — HOSPITAL ENCOUNTER (OUTPATIENT)
Dept: GENERAL RADIOLOGY | Facility: CLINIC | Age: 64
Discharge: HOME | End: 2024-02-08
Attending: INTERNAL MEDICINE | Admitting: INTERNAL MEDICINE
Payer: COMMERCIAL

## 2024-02-08 ENCOUNTER — OFFICE VISIT (OUTPATIENT)
Dept: INTERNAL MEDICINE | Facility: CLINIC | Age: 64
End: 2024-02-08
Payer: COMMERCIAL

## 2024-02-08 VITALS
RESPIRATION RATE: 12 BRPM | BODY MASS INDEX: 39.62 KG/M2 | SYSTOLIC BLOOD PRESSURE: 130 MMHG | TEMPERATURE: 98 F | DIASTOLIC BLOOD PRESSURE: 78 MMHG | WEIGHT: 267.5 LBS | HEART RATE: 77 BPM | HEIGHT: 69 IN | OXYGEN SATURATION: 99 %

## 2024-02-08 DIAGNOSIS — M79.672 PAIN OF LEFT HEEL: Primary | ICD-10-CM

## 2024-02-08 DIAGNOSIS — M79.672 PAIN OF LEFT HEEL: ICD-10-CM

## 2024-02-08 PROCEDURE — 73610 X-RAY EXAM OF ANKLE: CPT | Mod: LT

## 2024-02-08 PROCEDURE — 99213 OFFICE O/P EST LOW 20 MIN: CPT | Performed by: INTERNAL MEDICINE

## 2024-02-08 ASSESSMENT — PAIN SCALES - GENERAL: PAINLEVEL: NO PAIN (0)

## 2024-02-08 NOTE — PROGRESS NOTES
"      Tim Chew is a 63 year old, presenting for the following health issues:  Musculoskeletal Problem        2/8/2024     2:58 PM   Additional Questions   Roomed by Anival Doyle CMA     History of Present Illness       Reason for visit:  Achilles heal pain, left foot  Symptom onset:  3-4 weeks ago  Symptoms include:  Shooting pain intermittently,  Symptom intensity:  Moderate  Symptom progression:  Staying the same  Had these symptoms before:  No  What makes it worse:  It happens, then disappears  What makes it better:  Not really    He eats 4 or more servings of fruits and vegetables daily.He consumes 1 sweetened beverage(s) daily.He exercises with enough effort to increase his heart rate 20 to 29 minutes per day.  He exercises with enough effort to increase his heart rate 4 days per week. He is missing 1 dose(s) of medications per week.  He is not taking prescribed medications regularly due to remembering to take.           EMR reviewed including:             Complaint, History of Chief Complaint, Corresponding Review of Systems, and Complaint Specific Physical Examination.    #1   Left heel pain  Present for the last month.  Sharp, stabbing, intermittent.  Daughter works for a podiatrist.  Daughter says he needs an x-ray.  Patient notes no pain currently.  Patient does not take over-the-counter anti-inflammatories.  He is on glucosamine as needed.          Exam:  Achilles tendon is intact.  No evidence of ecchymosis.  Ankle is stable.  No crepitance.  Significant pes planus.  Minimal neuropathy in the feet.        Patient has been interviewed, applicable history and applied review of systems have been performed.    Vital Signs:   /78 (BP Location: Right arm, Patient Position: Chair, Cuff Size: Adult Large)   Pulse 77   Temp 98  F (36.7  C) (Temporal)   Resp 12   Ht 1.753 m (5' 9\")   Wt 121.3 kg (267 lb 8 oz)   SpO2 99%   BMI 39.50 kg/m        Decision Making    Problem and Complexity     1. " "Pain of left heel  The patient is concerned regarding the possibility of \"an extra bone\".  Apparently this \"extra bone\" has been diagnosed in his daughter and is genetic.  If the patient needs surgery to have the \"extra bone\" removed, he will have it done with his podiatrist.  Patient was seen by me today as his primary care provider was unavailable.  - XR Ankle Left G/E 3 Views; Future                                FOLLOW UP   I have asked the patient to make an appointment for followup with his primary care provider and/or podiatrist as needed/scheduled.    Regarding routine vaccinations:  I have reviewed the patient's vaccination schedule and discussed the benefits of prophylactic vaccination in detail.  I recommend the patient contact their pharmacist for vaccinations.  Discussed that most insurance companies now favor reimbursement to the pharmacies and it will financially behoove the patient to have vaccinations performed at their pharmacy.        I have carefully explained the diagnosis and treatment options to the patient.  The patient has displayed an understanding of the above, and all subsequent questions were answered.      DO JONATAN Li    Portions of this note were produced using [employer]  Although every attempt at real-time proof reading has been made, occasional grammar/syntax errors may have been missed.    "

## 2024-02-19 ENCOUNTER — VIRTUAL VISIT (OUTPATIENT)
Dept: FAMILY MEDICINE | Facility: OTHER | Age: 64
End: 2024-02-19
Payer: COMMERCIAL

## 2024-02-19 DIAGNOSIS — G62.1 ALCOHOLIC PERIPHERAL NEUROPATHY (H): ICD-10-CM

## 2024-02-19 DIAGNOSIS — G56.03 BILATERAL CARPAL TUNNEL SYNDROME: ICD-10-CM

## 2024-02-19 DIAGNOSIS — G56.23 ULNAR NEUROPATHY OF BOTH UPPER EXTREMITIES: Primary | ICD-10-CM

## 2024-02-19 DIAGNOSIS — F10.20 ALCOHOL DEPENDENCE, UNCOMPLICATED (H): ICD-10-CM

## 2024-02-19 DIAGNOSIS — E66.01 MORBID OBESITY (H): ICD-10-CM

## 2024-02-19 DIAGNOSIS — D32.9 MENINGIOMA (H): ICD-10-CM

## 2024-02-19 PROCEDURE — 99442 PR PHYSICIAN TELEPHONE EVALUATION 11-20 MIN: CPT | Mod: 93 | Performed by: PHYSICIAN ASSISTANT

## 2024-02-19 NOTE — PROGRESS NOTES
Jeevan is a 63 year old who is being evaluated via a billable telephone visit.      What phone number would you like to be contacted at? [phone]   How would you like to obtain your AVS? [place]  Distant Location (provider location):  On-site    Assessment & Plan       ICD-10-CM    1. Ulnar neuropathy of both upper extremities  G56.23 Orthopedic  Referral      2. Bilateral carpal tunnel syndrome  G56.03 Orthopedic  Referral      3. Alcoholic peripheral neuropathy (H24)  G62.1       4. Alcohol dependence, uncomplicated (H)  F10.20       5. Morbid obesity (H)  E66.01       6. Meningioma (H)  D32.9           1-2. I discussed his EMG results in detail as it revealed moderate right carpal tunnel and mild left carpal tunnel. It also revealed mild bilateral ulnar neuropathy. There was no evidence of cervical radiculopathy. He did have some right sided neural foraminal stenosis at multiple levels on his cervical MRI last year but it did not correlate with any EMG findings so his symptoms are likely due to the carpal tunnel and ulnar neuropathy findings. I recommend a nightly over the counter wrist splint and will refer to a hand specialist for further evaluation and treatment.       3-4. Symptoms stable, he drinks multiple beers per night.    5. I recommend he continue to work on a healthier lifestyle.    6. Stable on MRI last year.      Subjective   Jeevan is a 63 year old, presenting for the following health issues:  Results (EMG)        2/19/2024     8:26 AM   Additional Questions   Roomed by Aung ORO   Accompanied by Self     History of Present Illness       He eats 2-3 servings of fruits and vegetables daily.He consumes 1 sweetened beverage(s) daily.He exercises with enough effort to increase his heart rate 20 to 29 minutes per day.  He exercises with enough effort to increase his heart rate 4 days per week. He is missing 1 dose(s) of medications per week.  He is not taking prescribed medications  regularly due to remembering to take.     He would like to go over his recent EMG results in more detail. He has had numbness/tingling in his right hand between the 2nd and 3rd fingers for the past few years. He had an episode in which he noticed severe neck pain and pain shooting down his right arm into his hand. The shooting pain has improved but the numbness and tingling is still present constantly. He also has had numbness and tingling in the left 4th and 5th fingers since he woke up from his knee replacement surgery this past October. This area can be painful at times if he pushes on the side of his hand below his pinky as this triggers the tingly feeling. He denies any severe neck pain or any extremity weakness. He is wanting to know what the next steps are.       Review of Systems  Constitutional, cardiovascular, pulmonary, musculoskeletal, neuro, gi and gu systems are negative, except as otherwise noted.      Objective         Vitals:  No vitals were obtained today due to virtual visit.    Physical Exam   General: Alert and no distress //Respiratory: No audible wheeze, cough, or shortness of breath // Psychiatric:  Appropriate affect, tone, and pace of words        Phone call duration: 15:45 minutes  Signed Electronically by: Joe Licea PA-C

## 2024-02-26 ENCOUNTER — ANCILLARY PROCEDURE (OUTPATIENT)
Dept: GENERAL RADIOLOGY | Facility: CLINIC | Age: 64
End: 2024-02-26
Attending: ORTHOPAEDIC SURGERY
Payer: COMMERCIAL

## 2024-02-26 ENCOUNTER — OFFICE VISIT (OUTPATIENT)
Dept: ORTHOPEDICS | Facility: CLINIC | Age: 64
End: 2024-02-26
Attending: PHYSICIAN ASSISTANT
Payer: COMMERCIAL

## 2024-02-26 VITALS
WEIGHT: 267 LBS | HEIGHT: 69 IN | DIASTOLIC BLOOD PRESSURE: 88 MMHG | BODY MASS INDEX: 39.55 KG/M2 | SYSTOLIC BLOOD PRESSURE: 160 MMHG | TEMPERATURE: 97.9 F

## 2024-02-26 DIAGNOSIS — G56.03 BILATERAL CARPAL TUNNEL SYNDROME: Primary | ICD-10-CM

## 2024-02-26 DIAGNOSIS — G56.23 ULNAR NEUROPATHY OF BOTH UPPER EXTREMITIES: ICD-10-CM

## 2024-02-26 DIAGNOSIS — G56.03 BILATERAL CARPAL TUNNEL SYNDROME: ICD-10-CM

## 2024-02-26 PROCEDURE — 73100 X-RAY EXAM OF WRIST: CPT | Mod: TC | Performed by: RADIOLOGY

## 2024-02-26 PROCEDURE — 99213 OFFICE O/P EST LOW 20 MIN: CPT | Performed by: ORTHOPAEDIC SURGERY

## 2024-02-26 ASSESSMENT — PAIN SCALES - GENERAL: PAINLEVEL: MODERATE PAIN (5)

## 2024-02-26 NOTE — LETTER
2/26/2024         RE: Jeevan Lopez  [address] [facility] [address]        Dear Colleague,    Thank you for referring your patient, Jeevan Lopez, to the [facility]. Please see a copy of my visit note below.    Jeevan to follow up with Primary Care provider regarding elevated blood pressure.      S:  Patient starting to feel some sensory return ulnar margin L hand.  Also R knee is doing better and back to riding a road bike after TKA.         Patient Active Problem List   Diagnosis     Hypertension goal BP (blood pressure) < 140/90     Hyperlipidemia LDL goal <130     Chronic rhinitis     Deviated septum     GERD (gastroesophageal reflux disease)     Adenomatous polyp of colon     ED (erectile dysfunction)     Anxiety     NI (obstructive sleep apnea)     Morbid obesity (H)     Alcoholic peripheral neuropathy (H24)     Alcoholism /alcohol abuse     Meningioma (H)     Numbness and tingling in right hand     Osteoarthritis of right knee, unspecified osteoarthritis type     Status post total right knee replacement            Past Medical History:   Diagnosis Date     Adenomatous polyp of colon 09/2013    q 5 yr colonoscopy     Bell's palsy 01/17/2007     Deviated septum 09/17/2013    See CT scan     Hypertension      LVH (left ventricular hypertrophy) 06/07/2011    see stress echo     NI (obstructive sleep apnea) 2016    moderate - dental appliance     Osteoarthritis of right knee, unspecified osteoarthritis type 10/2/2023     Sensory polyneuropathy 2014    feet - see neuro consult     VENTRAL HERNIA NEC 06/19/2007            Past Surgical History:   Procedure Laterality Date     ABDOMEN SURGERY  March 2023    hernia     ARTHROPLASTY KNEE Right 10/2/2023    Procedure: ARTHROPLASTY,RIGHT  KNEE, TOTAL;  Surgeon: Darian Davies MD;  Location: [facility]     COLONOSCOPY  09/30/2013    Procedure: COMBINED COLONOSCOPY, SINGLE BIOPSY/POLYPECTOMY BY BIOPSY;  colonoscopy, polypectomy by  biopsy;  Surgeon: Pedro Chris MD;  Location: [facility]     COLONOSCOPY N/A 08/07/2017    Procedure: COLONOSCOPY;  colonoscopy;  Surgeon: Kiel Solomon MD;  Location: [facility]     COLONOSCOPY N/A 06/19/2023    Procedure: COLONOSCOPY, WITH POLYPECTOMY AND BIOPSY;  Surgeon: Clif Lua DO;  Location: [facility]     DAVINCI XI HERNIORRHAPHY VENTRAL N/A 03/10/2023    Procedure: Robot-assisted laparoscopic ventral hernia repair with mesh;  Surgeon: Clif Lua DO;  Location: [facility]     EXCISE MASS HEAD  11/18/2013    Procedure: EXCISE MASS HEAD;  Excision of left forehead mass;  Surgeon: Pelon Echavarria MD;  Location: [facility]     LASER YAG CAPSULOTOMY Left 06/02/2016    Procedure: LASER YAG CAPSULOTOMY;  Surgeon: Joss Raza MD;  Location: [facility]     PHACOEMULSIFICATION WITH STANDARD INTRAOCULAR LENS IMPLANT Left 11/05/2015    Procedure: PHACOEMULSIFICATION WITH STANDARD INTRAOCULAR LENS IMPLANT;  Surgeon: Joss Raza MD;  Location: [facility]     ZZHC REPAIR UMBILICAL YVONNE,5+Y/O, INCARCERATED OR STRANGULATED  06/09/2004            Social History     Tobacco Use     Smoking status: Never     Smokeless tobacco: Never   Substance Use Topics     Alcohol use: Yes     Comment: beer only, and occionally            Family History   Problem Relation Age of Onset     Diabetes Father      Hypertension Father      Heart Disease Father         double bypass-smoker     Cancer Father         lung, brain     Hypertension Brother      Hypertension Mother      Unknown/Adopted Maternal Grandmother      Unknown/Adopted Maternal Grandfather      Unknown/Adopted Paternal Grandmother      Unknown/Adopted Paternal Grandfather                Allergies   Allergen Reactions     Lisinopril Other (See Comments)     Dry hacky cough            Current Outpatient Medications   Medication Sig Dispense Refill     amLODIPine (NORVASC) 10 MG tablet Take 1 tablet (10 mg) by mouth daily 90 tablet 3      cyanocobalamin (VITAMIN B-12) 1000 MCG tablet Take by mouth daily       omeprazole 20 MG tablet Take 1 tablet (20 mg) by mouth daily 90 tablet 4     Vitamin A 7.5 MG (89244 UT) CAPS Take 1 capsule by mouth daily       vitamin D3 (CHOLECALCIFEROL) 250 mcg (16251 units) capsule Take 1 capsule by mouth daily            Review Of Systems  Skin: negative  Eyes: negative  Ears/Nose/Throat: negative  Respiratory: No shortness of breath, dyspnea on exertion, cough, or hemoptysis    O: Physical Exam:  Reports tinel's ulnar border L hand.  Adequate finger flexion and extension, wrist ulnar and radial deviation/palmar and dorsi flexion.  Adequate elbow flexion and extension/ pronation supination intact forearms.    Lab:Bilateral median neuropathy R greater than L, bilateral ulnar neuropathy mild bilateral, no evidence cervical radiculopathy recent EMG/NCV  D dimer 2.21, Crp 7.05, uric acid 6.2    Images:     XR WRIST BILATERAL 2 VIEWS  2/26/2024 9:05 AM      HISTORY: Bilateral carpal tunnel syndrome  COMPARISON: 11/29/2023                                                                      IMPRESSION: No acute fracture or malalignment. Mild CMC joint  degenerative changes bilaterally.        A:  R CTS greater than L.  Bilateral ulnar neuropathy mild, possible inflammatory arthropathy      P:  Discussed wrist brace to see if this helps CTS while sleeping, encouraged to continue with exercise and weight loss, discussed dietary changes to try to decrease purines/ oxyllates  Notify if exacerbation symptoms  See back as needed               In addition to the above assessment and plan each active problem on Jeevan's problem list was evaluated today. This included the questioning of Jeevan for any medication problems. We will continue the current treatment plan for these active problems except as noted.        Again, thank you for allowing me to participate in the care of your patient.        Sincerely,        Dung Perez MD

## 2024-02-26 NOTE — PROGRESS NOTES
S:  Patient starting to feel some sensory return ulnar margin L hand.  Also R knee is doing better and back to riding a road bike after TKA.         Patient Active Problem List   Diagnosis    Hypertension goal BP (blood pressure) < 140/90    Hyperlipidemia LDL goal <130    Chronic rhinitis    Deviated septum    GERD (gastroesophageal reflux disease)    Adenomatous polyp of colon    ED (erectile dysfunction)    Anxiety    NI (obstructive sleep apnea)    Morbid obesity (H)    Alcoholic peripheral neuropathy (H24)    Alcoholism /alcohol abuse    Meningioma (H)    Numbness and tingling in right hand    Osteoarthritis of right knee, unspecified osteoarthritis type    Status post total right knee replacement            Past Medical History:   Diagnosis Date    Adenomatous polyp of colon 09/2013    q 5 yr colonoscopy    Bell's palsy 01/17/2007    Deviated septum 09/17/2013    See CT scan    Hypertension     LVH (left ventricular hypertrophy) 06/07/2011    see stress echo    NI (obstructive sleep apnea) 2016    moderate - dental appliance    Osteoarthritis of right knee, unspecified osteoarthritis type 10/2/2023    Sensory polyneuropathy 2014    feet - see neuro consult    VENTRAL HERNIA NEC 06/19/2007            Past Surgical History:   Procedure Laterality Date    ABDOMEN SURGERY  March 2023    hernia    ARTHROPLASTY KNEE Right 10/2/2023    Procedure: ARTHROPLASTY,RIGHT  KNEE, TOTAL;  Surgeon: Darian Davies MD;  [facility]: [facility]    COLONOSCOPY  09/30/2013    Procedure: COMBINED COLONOSCOPY, SINGLE BIOPSY/POLYPECTOMY BY BIOPSY;  colonoscopy, polypectomy by biopsy;  Surgeon: Pedro Chris MD;  Location:  GI    COLONOSCOPY N/A 08/07/2017    Procedure: COLONOSCOPY;  colonoscopy;  Surgeon: Kiel Solomon MD;  Location:  GI    COLONOSCOPY N/A 06/19/2023    Procedure: COLONOSCOPY, WITH POLYPECTOMY AND BIOPSY;  Surgeon: Clif Lua DO;  Location:  GI    DAVINCI XI HERNIORRHAPHY VENTRAL N/A  03/10/2023    Procedure: Robot-assisted laparoscopic ventral hernia repair with mesh;  Surgeon: Clif Lua DO;  [facility]    EXCISE MASS HEAD  11/18/2013    Procedure: EXCISE MASS HEAD;  Excision of left forehead mass;  Surgeon: Pelon Echavarria MD;  [facility]    LASER YAG CAPSULOTOMY Left 06/02/2016    Procedure: LASER YAG CAPSULOTOMY;  Surgeon: Joss Raza MD;  [facility]    PHACOEMULSIFICATION WITH STANDARD INTRAOCULAR LENS IMPLANT Left 11/05/2015    Procedure: PHACOEMULSIFICATION WITH STANDARD INTRAOCULAR LENS IMPLANT;  Surgeon: Joss Raza MD;  [facility]    ZZHC REPAIR UMBILICAL YVONNE,5+Y/O, INCARCERATED OR STRANGULATED  06/09/2004            Social History     Tobacco Use    Smoking status: Never    Smokeless tobacco: Never   Substance Use Topics    Alcohol use: Yes     Comment: beer only, and occionally            Family History   Problem Relation Age of Onset    Diabetes Father     Hypertension Father     Heart Disease Father         double bypass-smoker    Cancer Father         lung, brain    Hypertension Brother     Hypertension Mother     Unknown/Adopted Maternal Grandmother     Unknown/Adopted Maternal Grandfather     Unknown/Adopted Paternal Grandmother     Unknown/Adopted Paternal Grandfather                Allergies   Allergen Reactions    Lisinopril Other (See Comments)     Dry hacky cough            Current Outpatient Medications   Medication Sig Dispense Refill    amLODIPine (NORVASC) 10 MG tablet Take 1 tablet (10 mg) by mouth daily 90 tablet 3    cyanocobalamin (VITAMIN B-12) 1000 MCG tablet Take by mouth daily      omeprazole 20 MG tablet Take 1 tablet (20 mg) by mouth daily 90 tablet 4    Vitamin A 7.5 MG (96112 UT) CAPS Take 1 capsule by mouth daily      vitamin D3 (CHOLECALCIFEROL) 250 mcg (41250 units) capsule Take 1 capsule by mouth daily            Review Of Systems  Skin: negative  Eyes: negative  Ears/Nose/Throat:  negative  Respiratory: No shortness of breath, dyspnea on exertion, cough, or hemoptysis    O: Physical Exam:  Reports tinel's ulnar border L hand.  Adequate finger flexion and extension, wrist ulnar and radial deviation/palmar and dorsi flexion.  Adequate elbow flexion and extension/ pronation supination intact forearms.    Lab:Bilateral median neuropathy R greater than L, bilateral ulnar neuropathy mild bilateral, no evidence cervical radiculopathy recent EMG/NCV  D dimer 2.21, Crp 7.05, uric acid 6.2    Images:     XR WRIST BILATERAL 2 VIEWS  2/26/2024 9:05 AM      HISTORY: Bilateral carpal tunnel syndrome  COMPARISON: 11/29/2023                                                                      IMPRESSION: No acute fracture or malalignment. Mild CMC joint  degenerative changes bilaterally.        A:  R CTS greater than L.  Bilateral ulnar neuropathy mild, possible inflammatory arthropathy      P:  Discussed wrist brace to see if this helps CTS while sleeping, encouraged to continue with exercise and weight loss, discussed dietary changes to try to decrease purines/ oxyllates  Notify if exacerbation symptoms  See back as needed               In addition to the above assessment and plan each active problem on Jeevan's problem list was evaluated today. This included the questioning of Jeevan for any medication problems. We will continue the current treatment plan for these active problems except as noted.

## 2024-03-16 ENCOUNTER — HEALTH MAINTENANCE LETTER (OUTPATIENT)
Age: 64
End: 2024-03-16

## 2024-03-18 ENCOUNTER — MYC MEDICAL ADVICE (OUTPATIENT)
Dept: FAMILY MEDICINE | Facility: OTHER | Age: 64
End: 2024-03-18
Payer: COMMERCIAL

## 2024-03-18 DIAGNOSIS — I10 HYPERTENSION GOAL BP (BLOOD PRESSURE) < 140/90: ICD-10-CM

## 2024-03-18 DIAGNOSIS — K21.9 GASTROESOPHAGEAL REFLUX DISEASE WITHOUT ESOPHAGITIS: Primary | ICD-10-CM

## 2024-03-20 PROBLEM — F10.20 ALCOHOL DEPENDENCE, UNCOMPLICATED (H): Status: RESOLVED | Noted: 2017-07-29 | Resolved: 2024-03-20

## 2024-03-20 PROBLEM — G62.1 ALCOHOLIC PERIPHERAL NEUROPATHY (H): Status: RESOLVED | Noted: 2017-07-29 | Resolved: 2024-03-20

## 2024-04-01 ENCOUNTER — LAB (OUTPATIENT)
Dept: LAB | Facility: CLINIC | Age: 64
End: 2024-04-01
Payer: COMMERCIAL

## 2024-04-01 DIAGNOSIS — I10 HYPERTENSION GOAL BP (BLOOD PRESSURE) < 140/90: ICD-10-CM

## 2024-04-01 LAB
ALBUMIN UR-MCNC: NEGATIVE MG/DL
ANION GAP SERPL CALCULATED.3IONS-SCNC: 10 MMOL/L (ref 7–15)
APPEARANCE UR: CLEAR
BILIRUB UR QL STRIP: NEGATIVE
BUN SERPL-MCNC: 14.9 MG/DL (ref 8–23)
CALCIUM SERPL-MCNC: 9.4 MG/DL (ref 8.8–10.2)
CHLORIDE SERPL-SCNC: 102 MMOL/L (ref 98–107)
COLOR UR AUTO: YELLOW
CREAT SERPL-MCNC: 0.89 MG/DL (ref 0.67–1.17)
DEPRECATED HCO3 PLAS-SCNC: 27 MMOL/L (ref 22–29)
EGFRCR SERPLBLD CKD-EPI 2021: >90 ML/MIN/1.73M2
GLUCOSE SERPL-MCNC: 110 MG/DL (ref 70–99)
GLUCOSE UR STRIP-MCNC: NEGATIVE MG/DL
HGB UR QL STRIP: NEGATIVE
KETONES UR STRIP-MCNC: NEGATIVE MG/DL
LEUKOCYTE ESTERASE UR QL STRIP: NEGATIVE
NITRATE UR QL: NEGATIVE
PH UR STRIP: 7 [PH] (ref 5–7)
POTASSIUM SERPL-SCNC: 4.4 MMOL/L (ref 3.4–5.3)
SODIUM SERPL-SCNC: 139 MMOL/L (ref 135–145)
SP GR UR STRIP: 1.01 (ref 1–1.03)
UROBILINOGEN UR STRIP-MCNC: NORMAL MG/DL

## 2024-04-01 PROCEDURE — 36415 COLL VENOUS BLD VENIPUNCTURE: CPT

## 2024-04-01 PROCEDURE — 81003 URINALYSIS AUTO W/O SCOPE: CPT

## 2024-04-01 PROCEDURE — 80048 BASIC METABOLIC PNL TOTAL CA: CPT

## 2024-04-05 ENCOUNTER — LAB (OUTPATIENT)
Dept: LAB | Facility: CLINIC | Age: 64
End: 2024-04-05
Payer: COMMERCIAL

## 2024-04-05 DIAGNOSIS — E78.5 HYPERLIPIDEMIA LDL GOAL <130: ICD-10-CM

## 2024-04-05 DIAGNOSIS — Z96.651 STATUS POST TOTAL RIGHT KNEE REPLACEMENT: ICD-10-CM

## 2024-04-05 DIAGNOSIS — Z13.220 SCREENING FOR HYPERLIPIDEMIA: Primary | ICD-10-CM

## 2024-04-05 LAB
CHOLEST SERPL-MCNC: 166 MG/DL
CRP SERPL-MCNC: 3.39 MG/L
D DIMER PPP FEU-MCNC: 0.43 UG/ML FEU (ref 0–0.5)
ERYTHROCYTE [SEDIMENTATION RATE] IN BLOOD BY WESTERGREN METHOD: 10 MM/HR (ref 0–20)
FASTING STATUS PATIENT QL REPORTED: YES
HDLC SERPL-MCNC: 58 MG/DL
LDLC SERPL CALC-MCNC: 90 MG/DL
NONHDLC SERPL-MCNC: 108 MG/DL
TRIGL SERPL-MCNC: 91 MG/DL

## 2024-04-05 PROCEDURE — 85652 RBC SED RATE AUTOMATED: CPT

## 2024-04-05 PROCEDURE — 36415 COLL VENOUS BLD VENIPUNCTURE: CPT

## 2024-04-05 PROCEDURE — 86140 C-REACTIVE PROTEIN: CPT

## 2024-04-05 PROCEDURE — 85379 FIBRIN DEGRADATION QUANT: CPT

## 2024-04-05 PROCEDURE — 80061 LIPID PANEL: CPT

## 2024-04-08 ENCOUNTER — TELEPHONE (OUTPATIENT)
Dept: NEUROSURGERY | Facility: CLINIC | Age: 64
End: 2024-04-08
Payer: COMMERCIAL

## 2024-04-08 DIAGNOSIS — D32.9 MENINGIOMA (H): Primary | ICD-10-CM

## 2024-04-08 NOTE — TELEPHONE ENCOUNTER
"Received staff message from scheduling team stating \"Spoke with this patient about his repeat MRI. Patient informed me that there is no ongoing symptoms or issues, hence he doesn't want to do a MRI at this time. Patient also stated that he will call back clinic when he needs an appointment\".   "

## 2024-04-08 NOTE — TELEPHONE ENCOUNTER
Left message to call back clinic to schedule rpt MRI in 1 year (late April of 2024) per staff message.

## 2024-07-10 ENCOUNTER — OFFICE VISIT (OUTPATIENT)
Dept: CARDIOLOGY | Facility: CLINIC | Age: 64
End: 2024-07-10
Payer: COMMERCIAL

## 2024-07-10 VITALS
SYSTOLIC BLOOD PRESSURE: 182 MMHG | DIASTOLIC BLOOD PRESSURE: 106 MMHG | HEART RATE: 70 BPM | BODY MASS INDEX: 39.55 KG/M2 | OXYGEN SATURATION: 97 % | WEIGHT: 267 LBS | HEIGHT: 69 IN

## 2024-07-10 DIAGNOSIS — R60.0 LOWER EXTREMITY EDEMA: ICD-10-CM

## 2024-07-10 DIAGNOSIS — R07.89 CHEST DISCOMFORT: Primary | ICD-10-CM

## 2024-07-10 DIAGNOSIS — I10 HYPERTENSION GOAL BP (BLOOD PRESSURE) < 140/90: ICD-10-CM

## 2024-07-10 PROCEDURE — 99204 OFFICE O/P NEW MOD 45 MIN: CPT | Performed by: INTERNAL MEDICINE

## 2024-07-10 RX ORDER — ASPIRIN 81 MG/1
81 TABLET ORAL DAILY
COMMUNITY

## 2024-07-10 NOTE — PROGRESS NOTES
HPI and Plan:   Jeevan is here as new patient referral for fatigue, edema .  Feeling bloated over the weekend.  Taking baking soda self medicated for multiple issues, neuropathy etc..  Previously has seen Dr. Montes when a STECO was performed which was negative.  No exertional SOB, CP.  Does have some dizziness.  No syncope.     Yesterday felt poor secondary to brain fog, bloating, fatigue.  Has NI and wears CPAP. .  History of HTN.  BP today markedly elevated 182/106.  Previous knee injury.      , TG 91, LDL 96.   Ingesting 3 tsp of baking soda daily.  Stopped last 2 days.   STECO from 11/2017    Self stopped Norvasc secondary LE edema. Has not taken for 5 days.   Previously on 10 mg daily.,   Lisinopril causing dry cough  EKG reviewed showing LAD.  No acute changes.   No problems with elliptical bike overall.      Occassionally at night right sided chest discomfort relieved with ASA.        Started elliptical yesterday.   6 beers per day.  Napping 1 hour daily.  Difficulty with sleeping.  High anxiety level.  Non smoker.    Father with CABG around the age of 70.  Mother with afib.   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      Current BMI 39.43  Lives in the [place] area.   No history of heart murmur    Recommend     Watch diet, salt, baking soda  Limit alcohol  Restart HTN medications  Recommend stress testing and echocardiogram.   Follow up in [facility]'s clinic visit entailed:  Review of the result(s) of each unique test - EKG, STECO  The following tests were independently interpreted by me as noted in my documentation: EKG  Ordering of each unique test  Prescription drug management  45 minutes spent by me on the date of the encounter doing chart review, history and exam, documentation and further activities per the note  Provider  Link to MDM Help Grid     The level of medical decision making during this visit was of moderate complexity.      No  orders of the defined types were placed in this encounter.    Orders Placed This Encounter   Medications    aspirin 81 MG EC tablet     Sig: Take 81 mg by mouth daily     Medications Discontinued During This Encounter   Medication Reason    omeprazole 20 MG tablet Therapy completed (No AVS)         No diagnosis found.    CURRENT MEDICATIONS:  Current Outpatient Medications   Medication Sig Dispense Refill    amLODIPine (NORVASC) 10 MG tablet Take 1 tablet (10 mg) by mouth daily 90 tablet 3    aspirin 81 MG EC tablet Take 81 mg by mouth daily      cyanocobalamin (VITAMIN B-12) 1000 MCG tablet Take by mouth daily      Vitamin A 7.5 MG (15770 UT) CAPS Take 1 capsule by mouth daily      vitamin D3 (CHOLECALCIFEROL) 250 mcg (75590 units) capsule Take 1 capsule by mouth daily         ALLERGIES     Allergies   Allergen Reactions    Lisinopril Other (See Comments)     Dry hacky cough       PAST MEDICAL HISTORY:  Past Medical History:   Diagnosis Date    Adenomatous polyp of colon 09/2013    q 5 yr colonoscopy    Bell's palsy 01/17/2007    Deviated septum 09/17/2013    See CT scan    Hypertension     LVH (left ventricular hypertrophy) 06/07/2011    see stress echo    NI (obstructive sleep apnea) 2016    moderate - dental appliance    Osteoarthritis of right knee, unspecified osteoarthritis type 10/2/2023    Sensory polyneuropathy 2014    feet - see neuro consult    VENTRAL HERNIA NEC 06/19/2007       PAST SURGICAL HISTORY:  Past Surgical History:   Procedure Laterality Date    ABDOMEN SURGERY  March 2023    hernia    ARTHROPLASTY KNEE Right 10/2/2023    Procedure: ARTHROPLASTY,RIGHT  KNEE, TOTAL;  Surgeon: Darian Davies MD;  [facility]    COLONOSCOPY  09/30/2013    Procedure: COMBINED COLONOSCOPY, SINGLE BIOPSY/POLYPECTOMY BY BIOPSY;  colonoscopy, polypectomy by biopsy;  Surgeon: Pedro Chris MD;  [facility]    COLONOSCOPY N/A 08/07/2017    Procedure: COLONOSCOPY;  colonoscopy;  Surgeon: Neha  Kiel Arriaga MD;  [facility]    COLONOSCOPY N/A 06/19/2023    Procedure: COLONOSCOPY, WITH POLYPECTOMY AND BIOPSY;  Surgeon: Clif Lua DO;  [facility]    DAVINCI XI HERNIORRHAPHY VENTRAL N/A 03/10/2023    Procedure: Robot-assisted laparoscopic ventral hernia repair with mesh;  Surgeon: Clif Lua DO;  [facility]    EXCISE MASS HEAD  11/18/2013    Procedure: EXCISE MASS HEAD;  Excision of left forehead mass;  Surgeon: Pelon Echavarria MD;  [facility]    LASER YAG CAPSULOTOMY Left 06/02/2016    Procedure: LASER YAG CAPSULOTOMY;  Surgeon: Joss Raza MD;  [facility]    PHACOEMULSIFICATION WITH STANDARD INTRAOCULAR LENS IMPLANT Left 11/05/2015    Procedure: PHACOEMULSIFICATION WITH STANDARD INTRAOCULAR LENS IMPLANT;  Surgeon: Joss Raza MD;  [facility]    ZZHC REPAIR UMBILICAL YVONNE,5+Y/O, INCARCERATED OR STRANGULATED  06/09/2004       FAMILY HISTORY:  Family History   Problem Relation Age of Onset    Diabetes Father     Hypertension Father     Heart Disease Father         double bypass-smoker    Cancer Father         lung, brain    Hypertension Brother     Hypertension Mother     Unknown/Adopted Maternal Grandmother     Unknown/Adopted Maternal Grandfather     Unknown/Adopted Paternal Grandmother     Unknown/Adopted Paternal Grandfather        SOCIAL HISTORY:  Social History     Socioeconomic History    Marital status:      Spouse name: None    Number of children: None    Years of education: None    Highest education level: None   Tobacco Use    Smoking status: Never    Smokeless tobacco: Never   Vaping Use    Vaping status: Never Used   Substance and Sexual Activity    Alcohol use: Yes     Comment: beer only, and occionally    Drug use: No    Sexual activity: Yes     Partners: Female     Comment: single, 1 child, work - self employed     Social Determinants of Health     Financial Resource Strain: Low Risk  (2/1/2024)    Financial  "Resource Strain     Within the past 12 months, have you or your family members you live with been unable to get utilities (heat, electricity) when it was really needed?: No   Food Insecurity: Low Risk  (2/1/2024)    Food Insecurity     Within the past 12 months, did you worry that your food would run out before you got money to buy more?: No     Within the past 12 months, did the food you bought just not last and you didn t have money to get more?: No   Transportation Needs: Low Risk  (2/1/2024)    Transportation Needs     Within the past 12 months, has lack of transportation kept you from medical appointments, getting your medicines, non-medical meetings or appointments, work, or from getting things that you need?: No   Interpersonal Safety: Low Risk  (9/25/2023)    Interpersonal Safety     Do you feel physically and emotionally safe where you currently live?: Yes     Within the past 12 months, have you been hit, slapped, kicked or otherwise physically hurt by someone?: No     Within the past 12 months, have you been humiliated or emotionally abused in other ways by your partner or ex-partner?: No   Housing Stability: Low Risk  (2/1/2024)    Housing Stability     Do you have housing? : Yes     Are you worried about losing your housing?: No       Review of Systems:  Skin:        Eyes:       ENT:       Respiratory:       Cardiovascular:       Gastroenterology:      Genitourinary:       Musculoskeletal:       Neurologic:       Psychiatric:       Heme/Lymph/Imm:       Endocrine:         Physical Exam:  Vitals: BP (!) 182/106   Pulse 70   Ht 1.753 m (5' 9\")   Wt 121.1 kg (267 lb)   SpO2 97%   BMI 39.43 kg/m      Constitutional:           Skin:             Head:           Eyes:           Lymph:      ENT:           Neck:           Respiratory:            Cardiac:                                                           GI:           Extremities and Muscular Skeletal:                 Neurological:           Psych:    " "     Recent Lab Results:  LIPID RESULTS:  Lab Results   Component Value Date    CHOL 166 04/05/2024    CHOL 203 (H) 04/20/2021    HDL 58 04/05/2024    HDL 57 04/20/2021    LDL 90 04/05/2024     (H) 04/20/2021    TRIG 91 04/05/2024    TRIG 106 04/20/2021    CHOLHDLRATIO 2.9 10/30/2015       LIVER ENZYME RESULTS:  Lab Results   Component Value Date    AST 22 01/27/2023    AST 15 04/20/2021    ALT 11 01/27/2023    ALT 16 04/20/2021       CBC RESULTS:  Lab Results   Component Value Date    WBC 10.5 01/27/2023    WBC 8.8 11/28/2017    RBC 5.49 01/27/2023    RBC 5.24 11/28/2017    HGB 13.1 (L) 10/04/2023    HGB 15.6 11/28/2017    HCT 46.4 02/28/2023    HCT 47.8 11/28/2017    MCV 91 01/27/2023    MCV 91 11/28/2017    MCH 30.1 01/27/2023    MCH 29.8 11/28/2017    MCHC 32.9 01/27/2023    MCHC 32.6 11/28/2017    RDW 14.1 01/27/2023    RDW 14.0 11/28/2017     01/27/2023     11/28/2017       BMP RESULTS:  Lab Results   Component Value Date     04/01/2024     04/20/2021    POTASSIUM 4.4 04/01/2024    POTASSIUM 4.2 04/20/2021    CHLORIDE 102 04/01/2024    CHLORIDE 103 04/20/2021    CO2 27 04/01/2024    CO2 33 (H) 04/20/2021    ANIONGAP 10 04/01/2024    ANIONGAP 2 (L) 04/20/2021     (H) 04/01/2024     (H) 10/03/2023     (H) 04/20/2021    BUN 14.9 04/01/2024    BUN 16 04/20/2021    CR 0.89 04/01/2024    CR 0.89 04/20/2021    GFRESTIMATED >90 04/01/2024    GFRESTIMATED >90 04/20/2021    GFRESTBLACK >90 04/20/2021    HAYLEY 9.4 04/01/2024    HAYLEY 9.1 04/20/2021        A1C RESULTS:  Lab Results   Component Value Date    A1C 5.4 01/27/2023    A1C 5.0 08/02/2017       INR RESULTS:  No results found for: \"INR\"        CC  No referring provider defined for this encounter.                "

## 2024-07-10 NOTE — LETTER
7/10/2024    Joe Licea PA-C  [address]  [facility] [address]    RE: Jeevan Lopez       Dear Colleague,     I had the pleasure of seeing Jeevan Lopez in the [facility].  HPI and Plan:   Jeevan is here as new patient referral for fatigue, edema .  Feeling bloated over the weekend.  Taking baking soda self medicated for multiple issues, neuropathy etc..  Previously has seen Dr. Montes when a STECO was performed which was negative.  No exertional SOB, CP.  Does have some dizziness.  No syncope.     Yesterday felt poor secondary to brain fog, bloating, fatigue.  Has NI and wears CPAP. .  History of HTN.  BP today markedly elevated 182/106.  Previous knee injury.      , TG 91, LDL 96.   Ingesting 3 tsp of baking soda daily.  Stopped last 2 days.   STECO from 11/2017    Self stopped Norvasc secondary LE edema. Has not taken for 5 days.   Previously on 10 mg daily.,   Lisinopril causing dry cough  EKG reviewed showing LAD.  No acute changes.   No problems with elliptical bike overall.      Occassionally at night right sided chest discomfort relieved with ASA.        Started elliptical yesterday.   6 beers per day.  Napping 1 hour daily.  Difficulty with sleeping.  High anxiety level.  Non smoker.    Father with CABG around the age of 70.  Mother with afib.   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      Current BMI 39.43  [place]ives in the [place] area.   No history of heart murmur    Recommend     Watch diet, salt, baking soda  Limit alcohol  Restart HTN medications  Recommend stress testing and echocardiogram.   Follow up in [facility]'s clinic visit entailed:  Review of the result(s) of each unique test - EKG, STECO  The following tests were independently interpreted by me as noted in my documentation: EKG  Ordering of each unique test  Prescription drug management  45 minutes spent by me on the date of the encounter doing  chart review, history and exam, documentation and further activities per the note  Provider  Link to MDM Help Grid     The level of medical decision making during this visit was of moderate complexity.      No orders of the defined types were placed in this encounter.    Orders Placed This Encounter   Medications    aspirin 81 MG EC tablet     Sig: Take 81 mg by mouth daily     Medications Discontinued During This Encounter   Medication Reason    omeprazole 20 MG tablet Therapy completed (No AVS)         No diagnosis found.    CURRENT MEDICATIONS:  Current Outpatient Medications   Medication Sig Dispense Refill    amLODIPine (NORVASC) 10 MG tablet Take 1 tablet (10 mg) by mouth daily 90 tablet 3    aspirin 81 MG EC tablet Take 81 mg by mouth daily      cyanocobalamin (VITAMIN B-12) 1000 MCG tablet Take by mouth daily      Vitamin A 7.5 MG (58837 UT) CAPS Take 1 capsule by mouth daily      vitamin D3 (CHOLECALCIFEROL) 250 mcg (30173 units) capsule Take 1 capsule by mouth daily         ALLERGIES     Allergies   Allergen Reactions    Lisinopril Other (See Comments)     Dry hacky cough       PAST MEDICAL HISTORY:  Past Medical History:   Diagnosis Date    Adenomatous polyp of colon 09/2013    q 5 yr colonoscopy    Bell's palsy 01/17/2007    Deviated septum 09/17/2013    See CT scan    Hypertension     LVH (left ventricular hypertrophy) 06/07/2011    see stress echo    NI (obstructive sleep apnea) 2016    moderate - dental appliance    Osteoarthritis of right knee, unspecified osteoarthritis type 10/2/2023    Sensory polyneuropathy 2014    feet - see neuro consult    VENTRAL HERNIA NEC 06/19/2007       PAST SURGICAL HISTORY:  Past Surgical History:   Procedure Laterality Date    ABDOMEN SURGERY  March 2023    hernia    ARTHROPLASTY KNEE Right 10/2/2023    Procedure: ARTHROPLASTY,RIGHT  KNEE, TOTAL;  Surgeon: Darian Davies MD;  [facility]    COLONOSCOPY  09/30/2013    Procedure: COMBINED COLONOSCOPY, SINGLE  BIOPSY/POLYPECTOMY BY BIOPSY;  colonoscopy, polypectomy by biopsy;  Surgeon: Pedro Chris MD;  [facility]    COLONOSCOPY N/A 08/07/2017    Procedure: COLONOSCOPY;  colonoscopy;  Surgeon: Kiel Solomon MD;  [facility]    COLONOSCOPY N/A 06/19/2023    Procedure: COLONOSCOPY, WITH POLYPECTOMY AND BIOPSY;  Surgeon: Clif Lua DO;  [facility]    DAVINCI XI HERNIORRHAPHY VENTRAL N/A 03/10/2023    Procedure: Robot-assisted laparoscopic ventral hernia repair with mesh;  Surgeon: Clif Lua DO;  [facility]    EXCISE MASS HEAD  11/18/2013    Procedure: EXCISE MASS HEAD;  Excision of left forehead mass;  Surgeon: Pelon Echavarria MD;  [facility]    LASER YAG CAPSULOTOMY Left 06/02/2016    Procedure: LASER YAG CAPSULOTOMY;  Surgeon: Joss Raza MD;  [facility]    PHACOEMULSIFICATION WITH STANDARD INTRAOCULAR LENS IMPLANT Left 11/05/2015    Procedure: PHACOEMULSIFICATION WITH STANDARD INTRAOCULAR LENS IMPLANT;  Surgeon: Joss Raza MD;  [facility]    ZZHC REPAIR UMBILICAL YVONNE,5+Y/O, INCARCERATED OR STRANGULATED  06/09/2004       FAMILY HISTORY:  Family History   Problem Relation Age of Onset    Diabetes Father     Hypertension Father     Heart Disease Father         double bypass-smoker    Cancer Father         lung, brain    Hypertension Brother     Hypertension Mother     Unknown/Adopted Maternal Grandmother     Unknown/Adopted Maternal Grandfather     Unknown/Adopted Paternal Grandmother     Unknown/Adopted Paternal Grandfather        SOCIAL HISTORY:  Social History     Socioeconomic History    Marital status:      Spouse name: None    Number of children: None    Years of education: None    Highest education level: None   Tobacco Use    Smoking status: Never    Smokeless tobacco: Never   Vaping Use    Vaping status: Never Used   Substance and Sexual Activity    Alcohol use: Yes     Comment: beer only, and occionally     "Drug use: No    Sexual activity: Yes     Partners: Female     Comment: single, 1 child, work - self employed     Social Determinants of Health     Financial Resource Strain: Low Risk  (2/1/2024)    Financial Resource Strain     Within the past 12 months, have you or your family members you live with been unable to get utilities (heat, electricity) when it was really needed?: No   Food Insecurity: Low Risk  (2/1/2024)    Food Insecurity     Within the past 12 months, did you worry that your food would run out before you got money to buy more?: No     Within the past 12 months, did the food you bought just not last and you didn t have money to get more?: No   Transportation Needs: Low Risk  (2/1/2024)    Transportation Needs     Within the past 12 months, has lack of transportation kept you from medical appointments, getting your medicines, non-medical meetings or appointments, work, or from getting things that you need?: No   Interpersonal Safety: Low Risk  (9/25/2023)    Interpersonal Safety     Do you feel physically and emotionally safe where you currently live?: Yes     Within the past 12 months, have you been hit, slapped, kicked or otherwise physically hurt by someone?: No     Within the past 12 months, have you been humiliated or emotionally abused in other ways by your partner or ex-partner?: No   Housing Stability: Low Risk  (2/1/2024)    Housing Stability     Do you have housing? : Yes     Are you worried about losing your housing?: No       Review of Systems:  Skin:        Eyes:       ENT:       Respiratory:       Cardiovascular:       Gastroenterology:      Genitourinary:       Musculoskeletal:       Neurologic:       Psychiatric:       Heme/Lymph/Imm:       Endocrine:         Physical Exam:  Vitals: BP (!) 182/106   Pulse 70   Ht 1.753 m (5' 9\")   Wt 121.1 kg (267 lb)   SpO2 97%   BMI 39.43 kg/m      Constitutional:           Skin:             Head:           Eyes:           Lymph:      ENT:       " "    Neck:           Respiratory:            Cardiac:                                                           GI:           Extremities and Muscular Skeletal:                 Neurological:           Psych:         Recent Lab Results:  LIPID RESULTS:  Lab Results   Component Value Date    CHOL 166 04/05/2024    CHOL 203 (H) 04/20/2021    HDL 58 04/05/2024    HDL 57 04/20/2021    LDL 90 04/05/2024     (H) 04/20/2021    TRIG 91 04/05/2024    TRIG 106 04/20/2021    CHOLHDLRATIO 2.9 10/30/2015       LIVER ENZYME RESULTS:  Lab Results   Component Value Date    AST 22 01/27/2023    AST 15 04/20/2021    ALT 11 01/27/2023    ALT 16 04/20/2021       CBC RESULTS:  Lab Results   Component Value Date    WBC 10.5 01/27/2023    WBC 8.8 11/28/2017    RBC 5.49 01/27/2023    RBC 5.24 11/28/2017    HGB 13.1 (L) 10/04/2023    HGB 15.6 11/28/2017    HCT 46.4 02/28/2023    HCT 47.8 11/28/2017    MCV 91 01/27/2023    MCV 91 11/28/2017    MCH 30.1 01/27/2023    MCH 29.8 11/28/2017    MCHC 32.9 01/27/2023    MCHC 32.6 11/28/2017    RDW 14.1 01/27/2023    RDW 14.0 11/28/2017     01/27/2023     11/28/2017       BMP RESULTS:  Lab Results   Component Value Date     04/01/2024     04/20/2021    POTASSIUM 4.4 04/01/2024    POTASSIUM 4.2 04/20/2021    CHLORIDE 102 04/01/2024    CHLORIDE 103 04/20/2021    CO2 27 04/01/2024    CO2 33 (H) 04/20/2021    ANIONGAP 10 04/01/2024    ANIONGAP 2 (L) 04/20/2021     (H) 04/01/2024     (H) 10/03/2023     (H) 04/20/2021    BUN 14.9 04/01/2024    BUN 16 04/20/2021    CR 0.89 04/01/2024    CR 0.89 04/20/2021    GFRESTIMATED >90 04/01/2024    GFRESTIMATED >90 04/20/2021    GFRESTBLACK >90 04/20/2021    HAYLEY 9.4 04/01/2024    HAYLEY 9.1 04/20/2021        A1C RESULTS:  Lab Results   Component Value Date    A1C 5.4 01/27/2023    A1C 5.0 08/02/2017       INR RESULTS:  No results found for: \"INR\"        CC  No referring provider defined for this encounter.      Thank " you for allowing me to participate in the care of your patient.      Sincerely,     SHANIA GONGORA MD     [facility]

## 2024-08-12 ENCOUNTER — HOSPITAL ENCOUNTER (OUTPATIENT)
Dept: CARDIOLOGY | Facility: CLINIC | Age: 64
Discharge: HOME | End: 2024-08-12
Attending: INTERNAL MEDICINE | Admitting: INTERNAL MEDICINE
Payer: COMMERCIAL

## 2024-08-12 DIAGNOSIS — R07.89 CHEST DISCOMFORT: ICD-10-CM

## 2024-08-12 PROCEDURE — 255N000002 HC RX 255 OP 636: Performed by: INTERNAL MEDICINE

## 2024-08-12 PROCEDURE — 93325 DOPPLER ECHO COLOR FLOW MAPG: CPT | Mod: 26 | Performed by: INTERNAL MEDICINE

## 2024-08-12 PROCEDURE — 93350 STRESS TTE ONLY: CPT | Mod: 26 | Performed by: INTERNAL MEDICINE

## 2024-08-12 PROCEDURE — 93321 DOPPLER ECHO F-UP/LMTD STD: CPT | Mod: 26 | Performed by: INTERNAL MEDICINE

## 2024-08-12 PROCEDURE — 93016 CV STRESS TEST SUPVJ ONLY: CPT | Performed by: INTERNAL MEDICINE

## 2024-08-12 PROCEDURE — 93018 CV STRESS TEST I&R ONLY: CPT | Performed by: INTERNAL MEDICINE

## 2024-08-12 PROCEDURE — 93325 DOPPLER ECHO COLOR FLOW MAPG: CPT | Mod: TC

## 2024-08-12 RX ADMIN — HUMAN ALBUMIN MICROSPHERES AND PERFLUTREN 2 ML: 10; .22 INJECTION, SOLUTION INTRAVENOUS at 09:13

## 2024-08-20 ENCOUNTER — HOSPITAL ENCOUNTER (OUTPATIENT)
Dept: CARDIOLOGY | Facility: CLINIC | Age: 64
Discharge: HOME | End: 2024-08-20
Attending: INTERNAL MEDICINE | Admitting: INTERNAL MEDICINE
Payer: COMMERCIAL

## 2024-08-20 DIAGNOSIS — R07.89 CHEST DISCOMFORT: ICD-10-CM

## 2024-08-20 LAB — LVEF ECHO: NORMAL

## 2024-08-20 PROCEDURE — 93306 TTE W/DOPPLER COMPLETE: CPT | Mod: 26 | Performed by: INTERNAL MEDICINE

## 2024-08-20 PROCEDURE — 93306 TTE W/DOPPLER COMPLETE: CPT

## 2024-10-18 DIAGNOSIS — Z96.651 STATUS POST TOTAL RIGHT KNEE REPLACEMENT: Primary | ICD-10-CM

## 2024-10-18 NOTE — TELEPHONE ENCOUNTER
REASON FOR VISIT: 1 yr f/u knee replacement    DATE OF APPT: 11/12/2024   NOTES (FOR ALL VISITS) STATUS DETAILS   OFFICE NOTE from referring provider N/A    OPERATIVE REPORT Internal [facility]  Darian Davies MD   Arthroplasty, right knee 10/02/2023   EMG Received [facility]  EMG 2/06/2024   MEDICATION LIST Internal    IMAGING  (FOR ALL VISITS)     XR Scheduled [facility]  XR Knee right 11/12/2024   MRI (HEAD, NECK, SPINE) N/A    CT (HEAD, NECK, SPINE) N/A

## 2024-10-23 ENCOUNTER — OFFICE VISIT (OUTPATIENT)
Dept: CARDIOLOGY | Facility: CLINIC | Age: 64
End: 2024-10-23
Attending: INTERNAL MEDICINE
Payer: COMMERCIAL

## 2024-10-23 VITALS
BODY MASS INDEX: 39.25 KG/M2 | RESPIRATION RATE: 18 BRPM | SYSTOLIC BLOOD PRESSURE: 158 MMHG | OXYGEN SATURATION: 98 % | HEART RATE: 82 BPM | HEIGHT: 69 IN | DIASTOLIC BLOOD PRESSURE: 86 MMHG | WEIGHT: 265 LBS

## 2024-10-23 DIAGNOSIS — R07.89 CHEST DISCOMFORT: ICD-10-CM

## 2024-10-23 DIAGNOSIS — I10 HYPERTENSION GOAL BP (BLOOD PRESSURE) < 140/90: Primary | ICD-10-CM

## 2024-10-23 PROCEDURE — 99214 OFFICE O/P EST MOD 30 MIN: CPT | Performed by: NURSE PRACTITIONER

## 2024-10-23 RX ORDER — HYDROCHLOROTHIAZIDE 25 MG/1
25 TABLET ORAL DAILY
Status: SHIPPED
Start: 2024-10-23

## 2024-10-23 NOTE — LETTER
10/23/2024    Joe Licea PA-C  [address]  [facility] [address]    RE: Jeevan Lopez       Dear Colleague,     I had the [facility] of [facility] in the [facility].    General Cardiology Clinic Progress Note  Jeevan Lopez MRN# [id number]   YOB: 1960 Age: 63 year old     Primary cardiologist: Dr. Cardoza    Reason for visit: Follow-up cardiac testing    History of presenting illness:    Jeevan Lopez is a pleasant 63 year old patient with past medical history significant for:    Hypertension  Hyperlipidemia  NI: Reports compliance with CPAP nightly   Morbid obesity: 39 kg/m   GERD: Reports using baking soda for treatment    Mr. Lopez was evaluated by Dr. Cardoza in 7/2024 for fatigue and edema.  He reported that he had brain fog as well as bloating and fatigue.  At the visit his blood pressure was significantly elevated at 182/106.  The patient had reported he previously stopped amlodipine due to lower extremity edema.  It was recommended that he restart his amlodipine limit alcohol intake and adopt a healthier diet.  Also he underwent a stress echocardiogram that was negative for ischemia and his echocardiogram showed preserved LVEF with moderate concentric LVH and aortic root dilatation at 3.9 cm.      Today he returns for a follow-up to review cardiac testing.  His blood pressure was elevated at 158/86, but he reported that he has not taken his a.m. medications.  Approximately 1 week ago he restarted HCTZ for which he was on previously.  Also, he started a beet supplement in hopes to lower his blood pressure as well.  Jeevan reports that he continues to ride his electric bike and does not report decreased exercise intolerance or shortness of breath on exertion.  His fatigue is intermittent and states he is compliant with his CPAP but does not feel that the mask fits him appropriately at times.    We discussed the results stress echocardiogram showing no  evidence of ischemia as well as the echocardiogram specifically the LVH and the importance of hypertension management.  Given he has not taken his a.m. blood pressure medications is difficult to know exactly what his blood pressure would be treated.  Also, he is concerned that HCTZ may increase his risk of skin cancer and is unsure if he wants to take it long-term.  I did discuss potentially transitioning to losartan but after comparing amlodipine and losartan via [employer] during the visit he elected to remain on amlodipine.    Diagnotic studies:  Stress echocardiogram 8/2024: Normal stress echo without evidence of stress-induced ischemia.  Echocardiogram 8/2024: LVEF of 55 to 60% with moderate concentric LVH.  Aortic root dilatation at 3.9 cm.          Assessment and Plan:     ASSESSMENT:    Poorly controlled hypertension  Preserved LVEF with moderate concentric LVH  Currently on amlodipine 10 mg daily and recently independently restarted HCTZ presumed dose of 25 mg daily as this was his previous prescription.  He also started a beet supplement with goal to lower his blood pressure.  We discussed alternatives to his current hypertension regimen, but patient expresses he does not like taking pharmaceuticals.  Ultimately, it was elected to continue medications as is.    PLAN:     Recommend following up with cardiology as needed       Orders this Visit:  No orders of the defined types were placed in this encounter.    Orders Placed This Encounter   Medications     DISCONTD: hydrochlorothiazide 10 mg/mL SUSP     Sig: Take by mouth daily.     hydroCHLOROthiazide (HYDRODIURIL) 25 MG tablet     Sig: Take 1 tablet (25 mg) by mouth daily.     Medications Discontinued During This Encounter   Medication Reason     hydrochlorothiazide 10 mg/mL SUSP        Today's clinic visit entailed:  Review of the result(s) of each unique test - echocardiogram, stress echo, EKG, labs  I spent a total of 30 minutes on the day of the visit.    "Time spent by me doing chart review, history and exam, documentation and further activities per the note  Provider  Link to [facility] Help Grid     The level of medical decision making during this visit was of moderate complexity.           Review of Systems:     Review of Systems:  Skin:        Eyes:       ENT:       Respiratory:  Negative shortness of breath;cough;wheezing  Cardiovascular:  Negative;palpitations;lightheadedness;dizziness;syncope or near-syncope Positive for;chest pain;edema  Gastroenterology: Positive for reflux  Genitourinary:       Musculoskeletal:       Neurologic:  Negative numbness or tingling of feet  Psychiatric:       Heme/Lymph/Imm:  Positive for allergies  Endocrine:                 Physical Exam:     Vitals: BP (!) 158/86 (BP Location: Right arm, Patient Position: Sitting, Cuff Size: Adult Large)   Pulse 82   Resp 18   Ht 1.753 m (5' 9\")   Wt 120.2 kg (265 lb)   SpO2 98%   BMI 39.13 kg/m    Constitutional: Well nourished and in no apparent distress.  Eyes: Pupils equal, round. Sclerae anicteric.   HEENT: Normocephalic, atraumatic.   Neck: Supple. JVD   Respiratory: Breathing non-labored. Lungs clear to auscultation bilaterally. No crackles, wheezes, rhonchi, or rales.  Cardiovascular:  Regular rate and rhythm, normal S1 and S2. No murmur, rub, or gallop.  Skin: Warm, dry. No rashes, cyanosis, or xanthelasma.  Extremities: No edema.  Neurologic: No gross motor deficits. Alert, awake, and oriented to person, place and time.  Psychiatric: Affect appropriate.        CURRENT MEDICATIONS:  Current Outpatient Medications   Medication Sig Dispense Refill     amLODIPine (NORVASC) 10 MG tablet Take 1 tablet (10 mg) by mouth daily 90 tablet 3     aspirin 81 MG EC tablet Take 81 mg by mouth daily       cyanocobalamin (VITAMIN B-12) 1000 MCG tablet Take by mouth daily       hydroCHLOROthiazide (HYDRODIURIL) 25 MG tablet Take 1 tablet (25 mg) by mouth daily.       Vitamin A 7.5 MG (08370 UT) CAPS " Take 1 capsule by mouth daily       vitamin D3 (CHOLECALCIFEROL) 250 mcg (09815 units) capsule Take 1 capsule by mouth daily         ALLERGIES  Allergies   Allergen Reactions     Lisinopril Other (See Comments)     Dry hacky cough         PAST MEDICAL HISTORY:  Past Medical History:   Diagnosis Date     Adenomatous polyp of colon 09/2013    q 5 yr colonoscopy     Bell's palsy 01/17/2007     Deviated septum 09/17/2013    See CT scan     Hypertension      LVH (left ventricular hypertrophy) 06/07/2011    see stress echo     NI (obstructive sleep apnea) 2016    moderate - dental appliance     Osteoarthritis of right knee, unspecified osteoarthritis type 10/2/2023     Sensory polyneuropathy 2014    feet - see neuro consult     VENTRAL HERNIA NEC 06/19/2007       PAST SURGICAL HISTORY:  Past Surgical History:   Procedure Laterality Date     ABDOMEN SURGERY  March 2023    hernia     ARTHROPLASTY KNEE Right 10/2/2023    Procedure: ARTHROPLASTY,RIGHT  KNEE, TOTAL;  Surgeon: Darian Davies MD;  [facility]     COLONOSCOPY  09/30/2013    Procedure: COMBINED COLONOSCOPY, SINGLE BIOPSY/POLYPECTOMY BY BIOPSY;  colonoscopy, polypectomy by biopsy;  Surgeon: Pedro Chris MD;  [facility]     COLONOSCOPY N/A 08/07/2017    Procedure: COLONOSCOPY;  colonoscopy;  Surgeon: Kiel Solomon MD;  [facility]     COLONOSCOPY N/A 06/19/2023    Procedure: COLONOSCOPY, WITH POLYPECTOMY AND BIOPSY;  Surgeon: Clif Lua DO;  [facility]     DAVINCI XI HERNIORRHAPHY VENTRAL N/A 03/10/2023    Procedure: Robot-assisted laparoscopic ventral hernia repair with mesh;  Surgeon: Clif Lua DO;  [facility]     EXCISE MASS HEAD  11/18/2013    Procedure: EXCISE MASS HEAD;  Excision of left forehead mass;  Surgeon: Pelon Echavarria MD;  [facility]     LASER YAG CAPSULOTOMY Left 06/02/2016    Procedure: LASER YAG CAPSULOTOMY;  Surgeon: Joss Raza MD;  [facility]      PHACOEMULSIFICATION WITH STANDARD INTRAOCULAR LENS IMPLANT Left 11/05/2015    Procedure: PHACOEMULSIFICATION WITH STANDARD INTRAOCULAR LENS IMPLANT;  Surgeon: Joss Raza MD;  [facility]     ZZHC REPAIR UMBILICAL YVONNE,5+Y/O, INCARCERATED OR STRANGULATED  06/09/2004       FAMILY HISTORY:  Family History   Problem Relation Age of Onset     Diabetes Father      Hypertension Father      Heart Disease Father         double bypass-smoker     Cancer Father         lung, brain     Hypertension Brother      Hypertension Mother      Unknown/Adopted Maternal Grandmother      Unknown/Adopted Maternal Grandfather      Unknown/Adopted Paternal Grandmother      Unknown/Adopted Paternal Grandfather        SOCIAL HISTORY:  Social History     Socioeconomic History     Marital status:      Spouse name: None     Number of children: None     Years of education: None     Highest education level: None   Tobacco Use     Smoking status: Never     Smokeless tobacco: Never   Vaping Use     Vaping status: Never Used   Substance and Sexual Activity     Alcohol use: Yes     Comment: beer only, and occionally     Drug use: No     Sexual activity: Yes     Partners: Female     Comment: single, 1 child, work - self employed     Social Drivers of Health     Financial Resource Strain: Low Risk  (2/1/2024)    Financial Resource Strain      Within the past 12 months, have you or your family members you live with been unable to get utilities (heat, electricity) when it was really needed?: No   Food Insecurity: Low Risk  (2/1/2024)    Food Insecurity      Within the past 12 months, did you worry that your food would run out before you got money to buy more?: No      Within the past 12 months, did the food you bought just not last and you didn t have money to get more?: No   Transportation Needs: Low Risk  (2/1/2024)    Transportation Needs      Within the past 12 months, has lack of transportation kept you from medical appointments,  getting your medicines, non-medical meetings or appointments, work, or from getting things that you need?: No   Interpersonal Safety: Low Risk  (9/25/2023)    Interpersonal Safety      Do you feel physically and emotionally safe where you currently live?: Yes      Within the past 12 months, have you been hit, slapped, kicked or otherwise physically hurt by someone?: No      Within the past 12 months, have you been humiliated or emotionally abused in other ways by your partner or ex-partner?: No   Housing Stability: Low Risk  (2/1/2024)    Housing Stability      Do you have housing? : Yes      Are you worried about losing your housing?: No               Thank you for allowing me to participate in the care of your patient.      Sincerely,     PRASAD Ta [facility]  cc:   Prem Cardoza MD  [address]  [place] [id number]

## 2024-10-23 NOTE — PROGRESS NOTES
General Cardiology Clinic Progress Note  Jeevan Lopez MRN# [id number]   YOB: 1960 Age: 63 year old     Primary cardiologist: Dr. Cardoza    Reason for visit: Follow-up cardiac testing    History of presenting illness:    Jeevan Lopez is a pleasant 63 year old patient with past medical history significant for:    Hypertension  Hyperlipidemia  NI: Reports compliance with CPAP nightly   Morbid obesity: 39 kg/m   GERD: Reports using baking soda for treatment    Mr. Lopez was evaluated by Dr. Cardoza in 7/2024 for fatigue and edema.  He reported that he had brain fog as well as bloating and fatigue.  At the visit his blood pressure was significantly elevated at 182/106.  The patient had reported he previously stopped amlodipine due to lower extremity edema.  It was recommended that he restart his amlodipine limit alcohol intake and adopt a healthier diet.  Also he underwent a stress echocardiogram that was negative for ischemia and his echocardiogram showed preserved LVEF with moderate concentric LVH and aortic root dilatation at 3.9 cm.      Today he returns for a follow-up to review cardiac testing.  His blood pressure was elevated at 158/86, but he reported that he has not taken his a.m. medications.  Approximately 1 week ago he restarted HCTZ for which he was on previously.  Also, he started a beet supplement in hopes to lower his blood pressure as well.  Jeevan reports that he continues to ride his electric bike and does not report decreased exercise intolerance or shortness of breath on exertion.  His fatigue is intermittent and states he is compliant with his CPAP but does not feel that the mask fits him appropriately at times.    We discussed the results stress echocardiogram showing no evidence of ischemia as well as the echocardiogram specifically the LVH and the importance of hypertension management.  Given he has not taken his a.m. blood pressure medications is difficult to know exactly what  his blood pressure would be treated.  Also, he is concerned that HCTZ may increase his risk of skin cancer and is unsure if he wants to take it long-term.  I did discuss potentially transitioning to losartan but after comparing amlodipine and losartan via [facility] during the visit he elected to remain on amlodipine.    Diagnotic studies:  Stress echocardiogram 8/2024: Normal stress echo without evidence of stress-induced ischemia.  Echocardiogram 8/2024: LVEF of 55 to 60% with moderate concentric LVH.  Aortic root dilatation at 3.9 cm.          Assessment and Plan:     ASSESSMENT:    Poorly controlled hypertension  Preserved LVEF with moderate concentric LVH  Currently on amlodipine 10 mg daily and recently independently restarted HCTZ presumed dose of 25 mg daily as this was his previous prescription.  He also started a beet supplement with goal to lower his blood pressure.  We discussed alternatives to his current hypertension regimen, but patient expresses he does not like taking pharmaceuticals.  Ultimately, it was elected to continue medications as is.    PLAN:     Recommend following up with cardiology as needed       Orders this Visit:  No orders of the defined types were placed in this encounter.    Orders Placed This Encounter   Medications    DISCONTD: hydrochlorothiazide 10 mg/mL SUSP     Sig: Take by mouth daily.    hydroCHLOROthiazide (HYDRODIURIL) 25 MG tablet     Sig: Take 1 tablet (25 mg) by mouth daily.     Medications Discontinued During This Encounter   Medication Reason    hydrochlorothiazide 10 mg/mL SUSP        Today's clinic visit entailed:  Review of the result(s) of each unique test - echocardiogram, stress echo, EKG, labs  I spent a total of 30 minutes on the day of the visit.   Time spent by me doing chart review, history and exam, documentation and further activities per the note  Provider  Link to MDM Help Grid     The level of medical decision making during this visit was of moderate  "complexity.           Review of Systems:     Review of Systems:  Skin:        Eyes:       ENT:       Respiratory:  Negative shortness of breath;cough;wheezing  Cardiovascular:  Negative;palpitations;lightheadedness;dizziness;syncope or near-syncope Positive for;chest pain;edema  Gastroenterology: Positive for reflux  Genitourinary:       Musculoskeletal:       Neurologic:  Negative numbness or tingling of feet  Psychiatric:       Heme/Lymph/Imm:  Positive for allergies  Endocrine:                 Physical Exam:     Vitals: BP (!) 158/86 (BP Location: Right arm, Patient Position: Sitting, Cuff Size: Adult Large)   Pulse 82   Resp 18   Ht 1.753 m (5' 9\")   Wt 120.2 kg (265 lb)   SpO2 98%   BMI 39.13 kg/m    Constitutional: Well nourished and in no apparent distress.  Eyes: Pupils equal, round. Sclerae anicteric.   HEENT: Normocephalic, atraumatic.   Neck: Supple. JVD   Respiratory: Breathing non-labored. Lungs clear to auscultation bilaterally. No crackles, wheezes, rhonchi, or rales.  Cardiovascular:  Regular rate and rhythm, normal S1 and S2. No murmur, rub, or gallop.  Skin: Warm, dry. No rashes, cyanosis, or xanthelasma.  Extremities: No edema.  Neurologic: No gross motor deficits. Alert, awake, and oriented to person, place and time.  Psychiatric: Affect appropriate.        CURRENT MEDICATIONS:  Current Outpatient Medications   Medication Sig Dispense Refill    amLODIPine (NORVASC) 10 MG tablet Take 1 tablet (10 mg) by mouth daily 90 tablet 3    aspirin 81 MG EC tablet Take 81 mg by mouth daily      cyanocobalamin (VITAMIN B-12) 1000 MCG tablet Take by mouth daily      hydroCHLOROthiazide (HYDRODIURIL) 25 MG tablet Take 1 tablet (25 mg) by mouth daily.      Vitamin A 7.5 MG (45833 UT) CAPS Take 1 capsule by mouth daily      vitamin D3 (CHOLECALCIFEROL) 250 mcg (65538 units) capsule Take 1 capsule by mouth daily         ALLERGIES  Allergies   Allergen Reactions    Lisinopril Other (See Comments)     Dry " hacky cough         PAST MEDICAL HISTORY:  Past Medical History:   Diagnosis Date    Adenomatous polyp of colon 09/2013    q 5 yr colonoscopy    Bell's palsy 01/17/2007    Deviated septum 09/17/2013    See CT scan    Hypertension     LVH (left ventricular hypertrophy) 06/07/2011    see stress echo    NI (obstructive sleep apnea) 2016    moderate - dental appliance    Osteoarthritis of right knee, unspecified osteoarthritis type 10/2/2023    Sensory polyneuropathy 2014    feet - see neuro consult    VENTRAL HERNIA NEC 06/19/2007       PAST SURGICAL HISTORY:  Past Surgical History:   Procedure Laterality Date    ABDOMEN SURGERY  March 2023    hernia    ARTHROPLASTY KNEE Right 10/2/2023    Procedure: ARTHROPLASTY,RIGHT  KNEE, TOTAL;  Surgeon: Darian Davies MD;  Location: [facility]    COLONOSCOPY  09/30/2013    Procedure: COMBINED COLONOSCOPY, SINGLE BIOPSY/POLYPECTOMY BY BIOPSY;  colonoscopy, polypectomy by biopsy;  Surgeon: Pedro Chris MD;  Location: [facility]    COLONOSCOPY N/A 08/07/2017    Procedure: COLONOSCOPY;  colonoscopy;  Surgeon: Kiel Solomon MD;  [facility]: [facility]    COLONOSCOPY N/A 06/19/2023    Procedure: COLONOSCOPY, WITH POLYPECTOMY AND BIOPSY;  Surgeon: Clif Lua DO;  Location: [facility]    DAVINCI XI HERNIORRHAPHY VENTRAL N/A 03/10/2023    Procedure: Robot-assisted laparoscopic ventral hernia repair with mesh;  Surgeon: Clif Lua DO;  Location: [facility]    EXCISE MASS HEAD  11/18/2013    Procedure: EXCISE MASS HEAD;  Excision of left forehead mass;  Surgeon: Pelon Echavarria MD;  Location[facility]    LASER YAG CAPSULOTOMY Left 06/02/2016    Procedure: LASER YAG CAPSULOTOMY;  Surgeon: Joss Raza MD;  [facility]    PHACOEMULSIFICATION WITH STANDARD INTRAOCULAR LENS IMPLANT Left 11/05/2015    Procedure: PHACOEMULSIFICATION WITH STANDARD INTRAOCULAR LENS IMPLANT;  Surgeon: Joss Raza MD;  Location: [facility]    ZZHC REPAIR UMBILICAL YVONNE,5+Y/O,  INCARCERATED OR STRANGULATED  06/09/2004       FAMILY HISTORY:  Family History   Problem Relation Age of Onset    Diabetes Father     Hypertension Father     Heart Disease Father         double bypass-smoker    Cancer Father         lung, brain    Hypertension Brother     Hypertension Mother     Unknown/Adopted Maternal Grandmother     Unknown/Adopted Maternal Grandfather     Unknown/Adopted Paternal Grandmother     Unknown/Adopted Paternal Grandfather        SOCIAL HISTORY:  Social History     Socioeconomic History    Marital status:      Spouse name: None    Number of children: None    Years of education: None    Highest education level: None   Tobacco Use    Smoking status: Never    Smokeless tobacco: Never   Vaping Use    Vaping status: Never Used   Substance and Sexual Activity    Alcohol use: Yes     Comment: beer only, and occionally    Drug use: No    Sexual activity: Yes     Partners: Female     Comment: single, 1 child, work - self employed     Social Drivers of Health     Financial Resource Strain: Low Risk  (2/1/2024)    Financial Resource Strain     Within the past 12 months, have you or your family members you live with been unable to get utilities (heat, electricity) when it was really needed?: No   Food Insecurity: Low Risk  (2/1/2024)    Food Insecurity     Within the past 12 months, did you worry that your food would run out before you got money to buy more?: No     Within the past 12 months, did the food you bought just not last and you didn t have money to get more?: No   Transportation Needs: Low Risk  (2/1/2024)    Transportation Needs     Within the past 12 months, has lack of transportation kept you from medical appointments, getting your medicines, non-medical meetings or appointments, work, or from getting things that you need?: No   Interpersonal Safety: Low Risk  (9/25/2023)    Interpersonal Safety     Do you feel physically and emotionally safe where you currently live?: Yes      Within the past 12 months, have you been hit, slapped, kicked or otherwise physically hurt by someone?: No     Within the past 12 months, have you been humiliated or emotionally abused in other ways by your partner or ex-partner?: No   Housing Stability: Low Risk  (2/1/2024)    Housing Stability     Do you have housing? : Yes     Are you worried about losing your housing?: No

## 2024-10-23 NOTE — PATIENT INSTRUCTIONS
TODAY'S RECOMMENDATIONS:    Continue all medications without changes.  Please follow up with cardiology as needed.    If you have questions or concerns please call clinic at [phone].    Please call [phone] for scheduling.      It was a pleasure seeing you today!

## 2024-11-01 DIAGNOSIS — I10 HYPERTENSION GOAL BP (BLOOD PRESSURE) < 140/90: ICD-10-CM

## 2024-11-01 RX ORDER — AMLODIPINE BESYLATE 10 MG/1
10 TABLET ORAL DAILY
Qty: 60 TABLET | Refills: 0 | Status: SHIPPED | OUTPATIENT
Start: 2024-11-01

## 2024-11-08 ASSESSMENT — KOOS JR
KOOS JR SCORING: 79.91
TWISING OR PIVOTING ON KNEE: MILD
GOING UP OR DOWN STAIRS: MILD
HOW SEVERE IS YOUR KNEE STIFFNESS AFTER FIRST WAKING IN MORNING: MILD

## 2024-11-12 ENCOUNTER — ANCILLARY PROCEDURE (OUTPATIENT)
Dept: GENERAL RADIOLOGY | Facility: CLINIC | Age: 64
End: 2024-11-12
Attending: ORTHOPAEDIC SURGERY
Payer: COMMERCIAL

## 2024-11-12 ENCOUNTER — OFFICE VISIT (OUTPATIENT)
Dept: ORTHOPEDICS | Facility: CLINIC | Age: 64
End: 2024-11-12
Payer: COMMERCIAL

## 2024-11-12 ENCOUNTER — PRE VISIT (OUTPATIENT)
Dept: ORTHOPEDICS | Facility: CLINIC | Age: 64
End: 2024-11-12

## 2024-11-12 DIAGNOSIS — Z96.651 STATUS POST TOTAL RIGHT KNEE REPLACEMENT: Primary | ICD-10-CM

## 2024-11-12 DIAGNOSIS — Z96.651 STATUS POST TOTAL RIGHT KNEE REPLACEMENT: ICD-10-CM

## 2024-11-12 PROCEDURE — 73562 X-RAY EXAM OF KNEE 3: CPT | Mod: RT | Performed by: INTERNAL MEDICINE

## 2024-11-12 PROCEDURE — 99213 OFFICE O/P EST LOW 20 MIN: CPT | Performed by: ORTHOPAEDIC SURGERY

## 2024-11-12 NOTE — PROGRESS NOTES
"Chief Complaint: Surgical Followup (1yr s.p RTKA 10/2/23)         HPI: Jeevan Lopez returns today in follow-up for his right knee.  He reports he is doing well.  He reports minimal symptoms.  He has been very active on his knee biking over 5000 km this summer.  He reports that he has had improved present in his motion and his function month month.  He is happy with how he is doing.  Reports \"I am going to give you a good review online.\".            Medications and allergies are documented in the EMR and have been reviewed.      Current Outpatient Medications:     amLODIPine (NORVASC) 10 MG tablet, Take 1 tablet by mouth once daily, Disp: 60 tablet, Rfl: 0    aspirin 81 MG EC tablet, Take 81 mg by mouth daily, Disp: , Rfl:     cyanocobalamin (VITAMIN B-12) 1000 MCG tablet, Take by mouth daily, Disp: , Rfl:     hydroCHLOROthiazide (HYDRODIURIL) 25 MG tablet, Take 1 tablet (25 mg) by mouth daily., Disp: , Rfl:     Vitamin A 7.5 MG (63521 UT) CAPS, Take 1 capsule by mouth daily, Disp: , Rfl:     vitamin D3 (CHOLECALCIFEROL) 250 mcg (20107 units) capsule, Take 1 capsule by mouth daily, Disp: , Rfl:     Allergies: Lisinopril    Current Status:  KOOS Jr: 79.71  [facility]:  Global Mental Health Score - (Patient-Rptd) (P) 15  Global Physical Health Score - (Patient-Rptd) (P) 16  PROMIS TOTAL - SUBSCORES - (Patient-Rptd) (P) 31    Physical Exam:  On physical examination the patient appears the stated age, is in no acute distress, affect is appropriate, and breathing is non-labored.    There were no vitals taken for this visit.  There is no height or weight on file to calculate BMI.    Rises from chair: Easily   Gait: normal  Appearance: benign  Clinical alignment: Neutral   Effusion:no  Incision is healed and benign  Extension: 0  Flexion: 115  Patellar tracking: Normal  Collateral ligaments: intact  Stable in in the sagittal plane in mid-flexion.  Distally he has 5/5 EHL gastrocsoleus and tib ant.  He has a palpable posterior " tibialis pulse.    We reviewed the radiographs together. These show the normal progression for a total hip arthroplasty without evidence of loosening or subsidence.  We also reviewed previous radiographs and show there is been no progression of any radiolucencies.      Assessment: Doing very well 1 year status post right total knee arthroplasty.  We discussed activities on total knee.  We discussed follow-up.  All the patient's questions were answered to the best of my ability.    Plan: Follow-up in 5 years for repeat radiographs.  Sooner if issues.  Joe Licea     Administered By (Optional): Jitendra

## 2024-11-12 NOTE — PROGRESS NOTES
Chief Complaint: Surgical Followup (1yr s.p RTKA 10/2/23)       HPI: Jeevan Lopez returns today in follow-up for 1 year follow-up right total knee arthroplasty.  He states that for about the first 6 to 8 months his recovery was somewhat difficult, but over the last 6 months or so he does feel that he has made major improvements and is quite happy with the results of his knee replacement at this point.  Notes that he has been out biking very frequently and is able to go up and down stairs with minimal issues.  Does not take any medications for pain at this point.  Feels that he has appropriate amount of range of motion in his knee and is overall very happy with his progress.      Medications and allergies are documented in the EMR and have been reviewed.    Current Outpatient Medications:     amLODIPine (NORVASC) 10 MG tablet, Take 1 tablet by mouth once daily, Disp: 60 tablet, Rfl: 0    aspirin 81 MG EC tablet, Take 81 mg by mouth daily, Disp: , Rfl:     cyanocobalamin (VITAMIN B-12) 1000 MCG tablet, Take by mouth daily, Disp: , Rfl:     hydroCHLOROthiazide (HYDRODIURIL) 25 MG tablet, Take 1 tablet (25 mg) by mouth daily., Disp: , Rfl:     Vitamin A 7.5 MG (31822 UT) CAPS, Take 1 capsule by mouth daily, Disp: , Rfl:     vitamin D3 (CHOLECALCIFEROL) 250 mcg (17121 units) capsule, Take 1 capsule by mouth daily, Disp: , Rfl:   Allergies: Lisinopril    Physical Exam:  On physical examination the patient appears the stated age, is in no acute distress, affect is appropriate, and breathing is non-labored.  There were no vitals taken for this visit.  There is no height or weight on file to calculate BMI.  Gait:     Appearance: benign  Clinical alignment: neutral  Effusion: none  Tenderness to palpation: none  Extension: 0  Flexion: 120  Patellar tracking: normal   Collateral ligaments: intact  Stable in in the sagittal plane in mid-flexion.    Distally, the circulatory, motor, and sensation exam is intact with 5/5 EHL,  gastroc-soleus, and tibialis anterior.  Sensation to light touch is intact.  Dorsalis pedis and posterior tibialis pulses are palpable.  There are no sores on the feet, no bruising, and no lymphedema.    X-rays:    I reviewed the x-rays dated today.  Previous films reviewed.    Findings:  Normal progression for a total knee arthroplasty without evidence of loosening or subsidence.    Assessment: 1 year status post right total knee arthroplasty progressing uneventfully    Plan: No activity restrictions, symptomatic pain management as needed, follow-up in 5 years for repeat x-rays and repeat evaluation    Joe Perez MD  Orthopedic Surgery, PGY-3

## 2024-11-12 NOTE — LETTER
11/12/2024      Jeevan Lopez  [address] [facility] [address]      Dear Colleague,    Thank you for referring your patient, Jeevan Lopez, to the [facility]. Please see a copy of my visit note below.    Chief Complaint: Surgical Followup (1yr s.p RTKA 10/2/23)       HPI: Jeevan Lopez returns today in follow-up for 1 year follow-up right total knee arthroplasty.  He states that for about the first 6 to 8 months his recovery was somewhat difficult, but over the last 6 months or so he does feel that he has made major improvements and is quite happy with the results of his knee replacement at this point.  Notes that he has been out biking very frequently and is able to go up and down stairs with minimal issues.  Does not take any medications for pain at this point.  Feels that he has appropriate amount of range of motion in his knee and is overall very happy with his progress.      Medications and allergies are documented in the EMR and have been reviewed.    Current Outpatient Medications:      amLODIPine (NORVASC) 10 MG tablet, Take 1 tablet by mouth once daily, Disp: 60 tablet, Rfl: 0     aspirin 81 MG EC tablet, Take 81 mg by mouth daily, Disp: , Rfl:      cyanocobalamin (VITAMIN B-12) 1000 MCG tablet, Take by mouth daily, Disp: , Rfl:      hydroCHLOROthiazide (HYDRODIURIL) 25 MG tablet, Take 1 tablet (25 mg) by mouth daily., Disp: , Rfl:      Vitamin A 7.5 MG (59941 UT) CAPS, Take 1 capsule by mouth daily, Disp: , Rfl:      vitamin D3 (CHOLECALCIFEROL) 250 mcg (30707 units) capsule, Take 1 capsule by mouth daily, Disp: , Rfl:   Allergies: Lisinopril    Physical Exam:  On physical examination the patient appears the stated age, is in no acute distress, affect is appropriate, and breathing is non-labored.  There were no vitals taken for this visit.  There is no height or weight on file to calculate BMI.  Gait:     Appearance: benign  Clinical alignment: neutral  Effusion:  "none  Tenderness to palpation: none  Extension: 0  Flexion: 120  Patellar tracking: normal   Collateral ligaments: intact  Stable in in the sagittal plane in mid-flexion.    Distally, the circulatory, motor, and sensation exam is intact with 5/5 EHL, gastroc-soleus, and tibialis anterior.  Sensation to light touch is intact.  Dorsalis pedis and posterior tibialis pulses are palpable.  There are no sores on the feet, no bruising, and no lymphedema.    X-rays:    I reviewed the x-rays dated today.  Previous films reviewed.    Findings:  Normal progression for a total knee arthroplasty without evidence of loosening or subsidence.    Assessment: 1 year status post right total knee arthroplasty progressing uneventfully    Plan: No activity restrictions, symptomatic pain management as needed, follow-up in 5 years for repeat x-rays and repeat evaluation    Joe Perez MD  Orthopedic Surgery, PGY-3      Chief Complaint: Surgical Followup (1yr s.p RTKA 10/2/23)         HPI: Jeevan Lopez returns today in follow-up for his right knee.  He reports he is doing well.  He reports minimal symptoms.  He has been very active on his knee biking over 5000 km this summer.  He reports that he has had improved present in his motion and his function month month.  He is happy with how he is doing.  Reports \"I am going to give you a good review online.\".            Medications and allergies are documented in the EMR and have been reviewed.      Current Outpatient Medications:      amLODIPine (NORVASC) 10 MG tablet, Take 1 tablet by mouth once daily, Disp: 60 tablet, Rfl: 0     aspirin 81 MG EC tablet, Take 81 mg by mouth daily, Disp: , Rfl:      cyanocobalamin (VITAMIN B-12) 1000 MCG tablet, Take by mouth daily, Disp: , Rfl:      hydroCHLOROthiazide (HYDRODIURIL) 25 MG tablet, Take 1 tablet (25 mg) by mouth daily., Disp: , Rfl:      Vitamin A 7.5 MG (90130 UT) CAPS, Take 1 capsule by mouth daily, Disp: , Rfl:      " vitamin D3 (CHOLECALCIFEROL) 250 mcg (39793 units) capsule, Take 1 capsule by mouth daily, Disp: , Rfl:     Allergies: Lisinopril    Current Status:  KOOS Jr: 79.71  [facility]:  Global Mental Health Score - (Patient-Rptd) (P) 15  Global Physical Health Score - (Patient-Rptd) (P) 16  PROMIS TOTAL - SUBSCORES - (Patient-Rptd) (P) 31    Physical Exam:  On physical examination the patient appears the stated age, is in no acute distress, affect is appropriate, and breathing is non-labored.    There were no vitals taken for this visit.  There is no height or weight on file to calculate BMI.    Rises from chair: Easily   Gait: normal  Appearance: benign  Clinical alignment: Neutral   Effusion:no  Incision is healed and benign  Extension: 0  Flexion: 115  Patellar tracking: Normal  Collateral ligaments: intact  Stable in in the sagittal plane in mid-flexion.  Distally he has 5/5 EHL gastrocsoleus and tib ant.  He has a palpable posterior tibialis pulse.    We reviewed the radiographs together. These show the normal progression for a total hip arthroplasty without evidence of loosening or subsidence.  We also reviewed previous radiographs and show there is been no progression of any radiolucencies.      Assessment: Doing very well 1 year status post right total knee arthroplasty.  We discussed activities on total knee.  We discussed follow-up.  All the patient's questions were answered to the best of my ability.    Plan: Follow-up in 5 years for repeat radiographs.  Sooner if issues.  Joe Licea      Again, thank you for allowing me to participate in the care of your patient.        Sincerely,        Darian Davies MD

## 2024-11-12 NOTE — NURSING NOTE
Jeevan Lopez's chief complaint for this visit includes:  Chief Complaint   Patient presents with    Surgical Followup     1yr s.p RTKA 10/2/23       Referring Provider:  Joe Licea PA-C  [address] [address]    There were no vitals taken for this visit.  Data Unavailable   Global Mental Health Score: (Patient-Rptd) (P) 15  Global Physical Health Score: (Patient-Rptd) (P) 16  PROMIS TOTAL - SUBSCORES: (Patient-Rptd) (P) 31  [facility]: 7  KOOS Jr.  79.91     Pain increases with: No pain, still some strain going up stairs  Previous surgeries: 10/2/23 RTKA   Previous injections within last 6 months: NO  Treatments done: HEP  Imaging completed: XR today  Pain: 2/10  Concerns: wants to check that everything is ok.     Braden Gonzalez, ATC              
(2) Forgets Limitations

## 2024-12-02 DIAGNOSIS — I10 HYPERTENSION GOAL BP (BLOOD PRESSURE) < 140/90: ICD-10-CM

## 2024-12-02 RX ORDER — HYDROCHLOROTHIAZIDE 25 MG/1
25 TABLET ORAL DAILY
Qty: 30 TABLET | Refills: 0 | Status: SHIPPED | OUTPATIENT
Start: 2024-12-02

## 2024-12-27 ENCOUNTER — OFFICE VISIT (OUTPATIENT)
Dept: ORTHOPEDICS | Facility: CLINIC | Age: 64
End: 2024-12-27
Payer: COMMERCIAL

## 2024-12-27 VITALS
WEIGHT: 271 LBS | TEMPERATURE: 98.4 F | HEART RATE: 67 BPM | HEIGHT: 69 IN | DIASTOLIC BLOOD PRESSURE: 88 MMHG | BODY MASS INDEX: 40.14 KG/M2 | SYSTOLIC BLOOD PRESSURE: 162 MMHG

## 2024-12-27 DIAGNOSIS — G56.03 BILATERAL CARPAL TUNNEL SYNDROME: Primary | ICD-10-CM

## 2024-12-27 PROCEDURE — 99214 OFFICE O/P EST MOD 30 MIN: CPT | Performed by: ORTHOPAEDIC SURGERY

## 2024-12-27 ASSESSMENT — PAIN SCALES - GENERAL: PAINLEVEL_OUTOF10: MILD PAIN (3)

## 2024-12-27 NOTE — LETTER
12/27/2024      Jeevan Lopez  [address] [facility] [address]      Dear Colleague,    Thank you for referring your patient, Jeevan Lopez, to the [facility]. Please see a copy of my visit note below.    S:  Knee doing much better after exercise/time following TKA.  Continues with bilateral CTS but doesn't feel bothersome enough at this point to require intervention.           Patient Active Problem List   Diagnosis     Hypertension goal BP (blood pressure) < 140/90     Hyperlipidemia LDL goal <130     Chronic rhinitis     Deviated septum     GERD (gastroesophageal reflux disease)     Adenomatous polyp of colon     ED (erectile dysfunction)     Anxiety     NI (obstructive sleep apnea)     Morbid obesity (H)     Meningioma (H)     Numbness and tingling in right hand     Osteoarthritis of right knee, unspecified osteoarthritis type     Status post total right knee replacement            Past Medical History:   Diagnosis Date     Adenomatous polyp of colon 09/2013    q 5 yr colonoscopy     Bell's palsy 01/17/2007     Deviated septum 09/17/2013    See CT scan     Hypertension      LVH (left ventricular hypertrophy) 06/07/2011    see stress echo     NI (obstructive sleep apnea) 2016    moderate - dental appliance     Osteoarthritis of right knee, unspecified osteoarthritis type 10/2/2023     Sensory polyneuropathy 2014    feet - see neuro consult     VENTRAL HERNIA NEC 06/19/2007            Past Surgical History:   Procedure Laterality Date     ABDOMEN SURGERY  March 2023    hernia     ARTHROPLASTY KNEE Right 10/2/2023    Procedure: ARTHROPLASTY,RIGHT  KNEE, TOTAL;  Surgeon: Darian Davies MD;  [facility]     COLONOSCOPY  09/30/2013    Procedure: COMBINED COLONOSCOPY, SINGLE BIOPSY/POLYPECTOMY BY BIOPSY;  colonoscopy, polypectomy by biopsy;  Surgeon: Pedro Chris MD;  Location:  GI     COLONOSCOPY N/A 08/07/2017    Procedure: COLONOSCOPY;  colonoscopy;  Surgeon:  Kiel Solomon MD;  [facility]: [facility]     COLONOSCOPY N/A 06/19/2023    Procedure: COLONOSCOPY, WITH POLYPECTOMY AND BIOPSY;  Surgeon: Clif Lua DO;  [facility]: [facility]     DAVINCI XI HERNIORRHAPHY VENTRAL N/A 03/10/2023    Procedure: Robot-assisted laparoscopic ventral hernia repair with mesh;  Surgeon: Clif Lua DO;  [facility]     EXCISE MASS HEAD  11/18/2013    Procedure: EXCISE MASS HEAD;  Excision of left forehead mass;  Surgeon: Pelon Echavarria MD;  [facility]     LASER YAG CAPSULOTOMY Left 06/02/2016    Procedure: LASER YAG CAPSULOTOMY;  Surgeon: Joss Raza MD;  [facility]     PHACOEMULSIFICATION WITH STANDARD INTRAOCULAR LENS IMPLANT Left 11/05/2015    Procedure: PHACOEMULSIFICATION WITH STANDARD INTRAOCULAR LENS IMPLANT;  Surgeon: Joss Raza MD;  [facility]     ZZHC REPAIR UMBILICAL YVONNE,5+Y/O, INCARCERATED OR STRANGULATED  06/09/2004            Social History     Tobacco Use     Smoking status: Never     Smokeless tobacco: Never   Substance Use Topics     Alcohol use: Yes     Comment: beer only, and occionally            Family History   Problem Relation Age of Onset     Diabetes Father      Hypertension Father      Heart Disease Father         double bypass-smoker     Cancer Father         lung, brain     Hypertension Brother      Hypertension Mother      Unknown/Adopted Maternal Grandmother      Unknown/Adopted Maternal Grandfather      Unknown/Adopted Paternal Grandmother      Unknown/Adopted Paternal Grandfather                Allergies   Allergen Reactions     Lisinopril Other (See Comments)     Dry hacky cough            Current Outpatient Medications   Medication Sig Dispense Refill     amLODIPine (NORVASC) 10 MG tablet Take 1 tablet by mouth once daily 60 tablet 0     aspirin 81 MG EC tablet Take 81 mg by mouth daily       cyanocobalamin (VITAMIN B-12) 1000 MCG tablet Take by mouth daily       hydrochlorothiazide  (HYDRODIURIL) 25 MG tablet Take 1 tablet (25 mg) by mouth daily. 30 tablet 0     Vitamin A 7.5 MG (05646 UT) CAPS Take 1 capsule by mouth daily       vitamin D3 (CHOLECALCIFEROL) 250 mcg (35447 units) capsule Take 1 capsule by mouth daily            Review Of Systems  Skin: negative  Eyes: negative  Ears/Nose/Throat: negative  Respiratory: No shortness of breath, dyspnea on exertion, cough, or hemoptysis    O: Physical Exam:  Some dysesthesia fingers both hands L greater than R but no significant thenar or hypothenar wasting.  R knee full extension and 140 degrees flexion/ no effusion or evidence synovitis.    Lab:ESR 10, CRP 3.39    Images:    Narrative & Impression   EXAM: XR KNEE RIGHT 3 VIEWS  LOCATION: [facility]  DATE: 11/12/2024     INDICATION:  Status post total right knee replacement  COMPARISON: 12/14/2023                                                                      IMPRESSION: There is a 2 component right knee arthroplasty in near-anatomic position. No periprosthetic fracture or lucency. Lucency along the posterior tibial component is not appreciated on this exam which may be secondary to obliquity. Corticated   fragmentation of the tibial tubercle is unchanged from prior. Increased corticated heterotopic ossification medial to the patella       XR WRIST BILATERAL 2 VIEWS  2/26/2024 9:05 AM      HISTORY: Bilateral carpal tunnel syndrome  COMPARISON: 11/29/2023                                                                      IMPRESSION: No acute fracture or malalignment. Mild CMC joint  degenerative changes bilaterally.      A:  Stable following R TKA, bilateral CTS      P:  Discussed pilates/ yoga/ stretching exercises  Notify if exacerbation symptoms  See back as needed             In addition to the above assessment and plan each active problem on Jeevan's problem list was evaluated today. This included the questioning of Jeevan for any medication problems. We will  continue the current treatment plan for these active problems except as noted.        Again, thank you for allowing me to participate in the care of your patient.        Sincerely,        Dung Perez MD    Electronically signed

## 2024-12-27 NOTE — PROGRESS NOTES
S:  Knee doing much better after exercise/time following TKA.  Continues with bilateral CTS but doesn't feel bothersome enough at this point to require intervention.           Patient Active Problem List   Diagnosis    Hypertension goal BP (blood pressure) < 140/90    Hyperlipidemia LDL goal <130    Chronic rhinitis    Deviated septum    GERD (gastroesophageal reflux disease)    Adenomatous polyp of colon    ED (erectile dysfunction)    Anxiety    NI (obstructive sleep apnea)    Morbid obesity (H)    Meningioma (H)    Numbness and tingling in right hand    Osteoarthritis of right knee, unspecified osteoarthritis type    Status post total right knee replacement            Past Medical History:   Diagnosis Date    Adenomatous polyp of colon 09/2013    q 5 yr colonoscopy    Bell's palsy 01/17/2007    Deviated septum 09/17/2013    See CT scan    Hypertension     LVH (left ventricular hypertrophy) 06/07/2011    see stress echo    NI (obstructive sleep apnea) 2016    moderate - dental appliance    Osteoarthritis of right knee, unspecified osteoarthritis type 10/2/2023    Sensory polyneuropathy 2014    feet - see neuro consult    VENTRAL HERNIA NEC 06/19/2007            Past Surgical History:   Procedure Laterality Date    ABDOMEN SURGERY  March 2023    hernia    ARTHROPLASTY KNEE Right 10/2/2023    Procedure: ARTHROPLASTY,RIGHT  KNEE, TOTAL;  Surgeon: Darian Davies MD;  [facility]    COLONOSCOPY  09/30/2013    Procedure: COMBINED COLONOSCOPY, SINGLE BIOPSY/POLYPECTOMY BY BIOPSY;  colonoscopy, polypectomy by biopsy;  Surgeon: Pedro Chris MD;  [facility]    COLONOSCOPY N/A 08/07/2017    Procedure: COLONOSCOPY;  colonoscopy;  Surgeon: Kiel Solomon MD;  [facility]    COLONOSCOPY N/A 06/19/2023    Procedure: COLONOSCOPY, WITH POLYPECTOMY AND BIOPSY;  Surgeon: Clif Lua DO;  Location:  GI    DAVINCI XI HERNIORRHAPHY VENTRAL N/A 03/10/2023    Procedure: Robot-assisted  laparoscopic ventral hernia repair with mesh;  Surgeon: Clif Lua DO;  [facility]    EXCISE MASS HEAD  11/18/2013    Procedure: EXCISE MASS HEAD;  Excision of left forehead mass;  Surgeon: Pelon Echavarria MD;  [facility]    LASER YAG CAPSULOTOMY Left 06/02/2016    Procedure: LASER YAG CAPSULOTOMY;  Surgeon: Joss Raza MD;  [facility]    PHACOEMULSIFICATION WITH STANDARD INTRAOCULAR LENS IMPLANT Left 11/05/2015    Procedure: PHACOEMULSIFICATION WITH STANDARD INTRAOCULAR LENS IMPLANT;  Surgeon: Joss Raza MD;  [facility]    ZZHC REPAIR UMBILICAL YVONNE,5+Y/O, INCARCERATED OR STRANGULATED  06/09/2004            Social History     Tobacco Use    Smoking status: Never    Smokeless tobacco: Never   Substance Use Topics    Alcohol use: Yes     Comment: beer only, and occionally            Family History   Problem Relation Age of Onset    Diabetes Father     Hypertension Father     Heart Disease Father         double bypass-smoker    Cancer Father         lung, brain    Hypertension Brother     Hypertension Mother     Unknown/Adopted Maternal Grandmother     Unknown/Adopted Maternal Grandfather     Unknown/Adopted Paternal Grandmother     Unknown/Adopted Paternal Grandfather                Allergies   Allergen Reactions    Lisinopril Other (See Comments)     Dry hacky cough            Current Outpatient Medications   Medication Sig Dispense Refill    amLODIPine (NORVASC) 10 MG tablet Take 1 tablet by mouth once daily 60 tablet 0    aspirin 81 MG EC tablet Take 81 mg by mouth daily      cyanocobalamin (VITAMIN B-12) 1000 MCG tablet Take by mouth daily      hydrochlorothiazide (HYDRODIURIL) 25 MG tablet Take 1 tablet (25 mg) by mouth daily. 30 tablet 0    Vitamin A 7.5 MG (82362 UT) CAPS Take 1 capsule by mouth daily      vitamin D3 (CHOLECALCIFEROL) 250 mcg (62409 units) capsule Take 1 capsule by mouth daily            Review Of Systems  Skin: negative  Eyes:  negative  Ears/Nose/Throat: negative  Respiratory: No shortness of breath, dyspnea on exertion, cough, or hemoptysis    O: Physical Exam:  Some dysesthesia fingers both hands L greater than R but no significant thenar or hypothenar wasting.  R knee full extension and 140 degrees flexion/ no effusion or evidence synovitis.    Lab:ESR 10, CRP 3.39    Images:    Narrative & Impression   EXAM: XR KNEE RIGHT 3 VIEWS  LOCATION: [facility]  DATE: 11/12/2024     INDICATION:  Status post total right knee replacement  COMPARISON: 12/14/2023                                                                      IMPRESSION: There is a 2 component right knee arthroplasty in near-anatomic position. No periprosthetic fracture or lucency. Lucency along the posterior tibial component is not appreciated on this exam which may be secondary to obliquity. Corticated   fragmentation of the tibial tubercle is unchanged from prior. Increased corticated heterotopic ossification medial to the patella       XR WRIST BILATERAL 2 VIEWS  2/26/2024 9:05 AM      HISTORY: Bilateral carpal tunnel syndrome  COMPARISON: 11/29/2023                                                                      IMPRESSION: No acute fracture or malalignment. Mild CMC joint  degenerative changes bilaterally.      A:  Stable following R TKA, bilateral CTS      P:  Discussed pilates/ yoga/ stretching exercises  Notify if exacerbation symptoms  See back as needed             In addition to the above assessment and plan each active problem on Jeevan's problem list was evaluated today. This included the questioning of Jeevan for any medication problems. We will continue the current treatment plan for these active problems except as noted.

## 2024-12-31 ENCOUNTER — VIRTUAL VISIT (OUTPATIENT)
Dept: FAMILY MEDICINE | Facility: OTHER | Age: 64
End: 2024-12-31
Payer: COMMERCIAL

## 2024-12-31 DIAGNOSIS — I10 HYPERTENSION GOAL BP (BLOOD PRESSURE) < 140/90: ICD-10-CM

## 2024-12-31 DIAGNOSIS — Z59.71 INSUFFICIENT HEALTH INSURANCE COVERAGE: Primary | ICD-10-CM

## 2024-12-31 PROCEDURE — 99214 OFFICE O/P EST MOD 30 MIN: CPT | Mod: 95 | Performed by: STUDENT IN AN ORGANIZED HEALTH CARE EDUCATION/TRAINING PROGRAM

## 2024-12-31 RX ORDER — HYDROCHLOROTHIAZIDE 25 MG/1
25 TABLET ORAL DAILY
Qty: 60 TABLET | Refills: 1 | Status: SHIPPED | OUTPATIENT
Start: 2024-12-31

## 2024-12-31 RX ORDER — AMLODIPINE BESYLATE 10 MG/1
10 TABLET ORAL DAILY
Qty: 60 TABLET | Refills: 1 | Status: SHIPPED | OUTPATIENT
Start: 2024-12-31

## 2024-12-31 NOTE — PROGRESS NOTES
Jeevan is a 64 year old who is being evaluated via a billable video visit.    How would you like to obtain your AVS? Aronhart  If the video visit is dropped, the invitation should be resent by: Text to cell phone: [phone]  Will anyone else be joining your video visit? No      Assessment & Plan     Medication refill request  Hypertension goal BP (blood pressure) < 140/90  - amLODIPine (NORVASC) 10 MG tablet; Take 1 tablet (10 mg) by mouth daily.  - hydrochlorothiazide (HYDRODIURIL) 25 MG tablet; Take 1 tablet (25 mg) by mouth daily.  Insufficient health insurance coverage  - Primary Care - Care Coordination Referral; Future  Patient requesting medication refill for his antihypertensives, amlodipine 10 mg and hydrochlorothiazide 25 mg.  Has been fairly stable with them in years past, this year his blood pressure has been elevated on occasion with orthopedics although he has had recent surgeries this year.  I would think that some of his readings are falsely elevated due to this, he was able to take his blood pressure during today's visit and was slightly elevated at 146 systolic and 86 diastolic.  He is overdue for annual checkup, he did have labs done in April of this year which do look good.  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  We did discuss about options including care coordination to discuss with him about alternative options which she is interested in.  I would want him back for a visit to check his blood pressure in clinic but he is concerned about cost as well as potentially high deductible.  I am okay refilling his medications until he is due for labs in April 2025, we can complete labs at any time at his request or if he gets insurance coverage sooner.  Refills placed to his pharmacy.  He will notify me or through care coordination if or when he potential gets better coverage.            Subjective   Jeevan  is a 64 year old, presenting for the following health issues:  Hypertension      12/31/2024     4:03 PM   Additional Questions   Roomed by joao   Accompanied by self     Video Start Time:  4:28 PM    History of Present Illness       Reason for visit:  Renew prescriptions He is missing 1 dose(s) of medications per week.  He is not taking prescribed medications regularly due to remembering to take.         Medication Followup of amlodipine and hydrochlorothiazide  Taking Medication as prescribed: yes  Side Effects:  None  Medication Helping Symptoms:  yes      Review of Systems  Constitutional, HEENT, cardiovascular, pulmonary, gi and gu systems are negative, except as otherwise noted.      Objective           Vitals:  No vitals were obtained today due to virtual visit.    Physical Exam   GENERAL: alert and no distress  EYES: Eyes grossly normal to inspection.  No discharge or erythema, or obvious scleral/conjunctival abnormalities.  RESP: No audible wheeze, cough, or visible cyanosis.    SKIN: Visible skin clear. No significant rash, abnormal pigmentation or lesions.  NEURO: Cranial nerves grossly intact.  Mentation and speech appropriate for age.  PSYCH: Appropriate affect, tone, and pace of words          Video-Visit Details    Type of service:  Video Visit   Video End Time:4:44 PM  Originating Location (pt. Location): Home    Distant Location (provider location):  Off-site  Platform used for Video Visit: Tenzin  Signed Electronically by: AKOSUA KRUEGER MD

## 2025-01-02 ENCOUNTER — PATIENT OUTREACH (OUTPATIENT)
Dept: CARE COORDINATION | Facility: CLINIC | Age: 65
End: 2025-01-02
Payer: COMMERCIAL

## 2025-01-02 NOTE — PROGRESS NOTES
Clinic Care Coordination Contact  Community Health Worker Initial Outreach    CHW Initial Information Gathering:  Referral Source: PCP  Preferred Hospital: Other ([facility])  Preferred Urgent Care: [facility], [phone]  No PCP office visit in Past Year: No       Patient accepts CC: No, due to the patient stating that at this time he is not needing supports or resources that he needs from CCC. Patient will be sent Care Coordination introduction letter for future reference.     MILAGROS Avila  [phone]  Connected Care Resource Center  [facility]

## 2025-01-02 NOTE — LETTER
[facility] CARE COORDINATION  [address]  [place] [address]    January 2, 2025    Jeevan Lopez  [address] [facility] [address]      Dear Jeevan,    I am a clinic community health worker who works with Joe Licea PA-C with the [facility]. I wanted to thank you for spending the time to talk with me.  Below is a description of clinic care coordination and how we can further assist you.       The clinic care coordination team is made up of a registered nurse, , financial resource worker and community health worker who understand the health care system. The goal of clinic care coordination is to help you manage your health and improve access to the health care system. Our team works alongside your provider to assist you in determining your health and social needs. We can help you obtain health care and community resources, providing you with necessary information and education. We can work with you through any barriers and develop a care plan that helps coordinate and strengthen the communication between you and your care team.  Our services are voluntary and are offered without charge to you personally.    Please feel free to contact me with any questions or concerns regarding care coordination and what we can offer.      We are focused on providing you with the highest-quality healthcare experience possible.    Sincerely,     MILAGROS Avila  [phone]  Connected Care Resource Center  [facility]

## 2025-02-24 ENCOUNTER — PATIENT OUTREACH (OUTPATIENT)
Dept: GASTROENTEROLOGY | Facility: CLINIC | Age: 65
End: 2025-02-24
Payer: COMMERCIAL

## 2025-03-22 ENCOUNTER — HEALTH MAINTENANCE LETTER (OUTPATIENT)
Age: 65
End: 2025-03-22

## 2025-05-19 ENCOUNTER — TELEPHONE (OUTPATIENT)
Dept: FAMILY MEDICINE | Facility: CLINIC | Age: 65
End: 2025-05-19
Payer: COMMERCIAL

## 2025-05-19 DIAGNOSIS — I10 HYPERTENSION GOAL BP (BLOOD PRESSURE) < 140/90: ICD-10-CM

## 2025-05-19 RX ORDER — AMLODIPINE BESYLATE 10 MG/1
10 TABLET ORAL DAILY
Qty: 60 TABLET | Refills: 1 | Status: SHIPPED | OUTPATIENT
Start: 2025-05-19 | End: 2025-05-19

## 2025-05-19 RX ORDER — AMLODIPINE BESYLATE 10 MG/1
10 TABLET ORAL DAILY
Qty: 90 TABLET | Refills: 0 | Status: SHIPPED | OUTPATIENT
Start: 2025-05-19

## 2025-05-19 NOTE — TELEPHONE ENCOUNTER
Patient is scheduled with DOMINGA tomorrow. Patient needs an amlodipine refill and is requesting 90 days. Patient would also like to re-establish in [place].     Renuka Godinez, VF

## 2025-05-19 NOTE — TELEPHONE ENCOUNTER
It appears someone from  rescheduled this patient with BRAYAN in July. Closing encounter.    Renuka Godinez, VF

## 2025-05-19 NOTE — TELEPHONE ENCOUNTER
Medication refilled x 90 days and no refills.  He needs to establish with a primary care provider that is not one of our same-day providers.    Patient can see me and I recommend he schedule a preventative visit with hypertension recheck sometime in the next 90 days.    He does not need to see Kathrine Mcginnis PA-C tomorrow and since  he has been of medication for the past  3 weeks, this would not make sense anyway.    1 option would be to see me the week of July 21 and it is okay to use any of the held spots on my schedule.    Ritesh Pabon MD

## 2025-05-19 NOTE — TELEPHONE ENCOUNTER
Attempted contacting patient, no answer, left a message asking for a return call.    Please see provider message below. Sending MyChart as well.     Renuka Godinez, VF

## 2025-07-30 ENCOUNTER — OFFICE VISIT (OUTPATIENT)
Dept: INTERNAL MEDICINE | Facility: CLINIC | Age: 65
End: 2025-07-30
Payer: COMMERCIAL

## 2025-07-30 VITALS
BODY MASS INDEX: 38.91 KG/M2 | WEIGHT: 262.7 LBS | HEIGHT: 69 IN | TEMPERATURE: 96.9 F | DIASTOLIC BLOOD PRESSURE: 80 MMHG | HEART RATE: 80 BPM | OXYGEN SATURATION: 98 % | RESPIRATION RATE: 16 BRPM | SYSTOLIC BLOOD PRESSURE: 138 MMHG

## 2025-07-30 DIAGNOSIS — E66.01 MORBID OBESITY (H): ICD-10-CM

## 2025-07-30 DIAGNOSIS — J30.1 SEASONAL ALLERGIC RHINITIS DUE TO POLLEN: ICD-10-CM

## 2025-07-30 DIAGNOSIS — I10 HYPERTENSION GOAL BP (BLOOD PRESSURE) < 140/90: Primary | ICD-10-CM

## 2025-07-30 DIAGNOSIS — D32.9 MENINGIOMA (H): ICD-10-CM

## 2025-07-30 DIAGNOSIS — E78.5 HYPERLIPIDEMIA LDL GOAL <130: ICD-10-CM

## 2025-07-30 DIAGNOSIS — R73.09 ELEVATED GLUCOSE: ICD-10-CM

## 2025-07-30 DIAGNOSIS — K21.9 GASTROESOPHAGEAL REFLUX DISEASE WITHOUT ESOPHAGITIS: ICD-10-CM

## 2025-07-30 DIAGNOSIS — G47.33 OSA (OBSTRUCTIVE SLEEP APNEA): ICD-10-CM

## 2025-07-30 LAB
ALBUMIN SERPL BCG-MCNC: 4 G/DL (ref 3.5–5.2)
ALP SERPL-CCNC: 82 U/L (ref 40–150)
ALT SERPL W P-5'-P-CCNC: 10 U/L (ref 0–70)
ANION GAP SERPL CALCULATED.3IONS-SCNC: 10 MMOL/L (ref 7–15)
AST SERPL W P-5'-P-CCNC: 16 U/L (ref 0–45)
BILIRUB SERPL-MCNC: 0.7 MG/DL
BUN SERPL-MCNC: 13.9 MG/DL (ref 8–23)
CALCIUM SERPL-MCNC: 9.4 MG/DL (ref 8.8–10.4)
CHLORIDE SERPL-SCNC: 105 MMOL/L (ref 98–107)
CHOLEST SERPL-MCNC: 157 MG/DL
CREAT SERPL-MCNC: 0.9 MG/DL (ref 0.67–1.17)
EGFRCR SERPLBLD CKD-EPI 2021: >90 ML/MIN/1.73M2
EST. AVERAGE GLUCOSE BLD GHB EST-MCNC: 105 MG/DL
FASTING STATUS PATIENT QL REPORTED: NO
FASTING STATUS PATIENT QL REPORTED: NO
GLUCOSE SERPL-MCNC: 88 MG/DL (ref 70–99)
HBA1C MFR BLD: 5.3 %
HCO3 SERPL-SCNC: 26 MMOL/L (ref 22–29)
HDLC SERPL-MCNC: 57 MG/DL
LDLC SERPL CALC-MCNC: 79 MG/DL
NONHDLC SERPL-MCNC: 100 MG/DL
POTASSIUM SERPL-SCNC: 3.9 MMOL/L (ref 3.4–5.3)
PROT SERPL-MCNC: 6.9 G/DL (ref 6.4–8.3)
SODIUM SERPL-SCNC: 141 MMOL/L (ref 135–145)
TRIGL SERPL-MCNC: 103 MG/DL

## 2025-07-30 PROCEDURE — 1126F AMNT PAIN NOTED NONE PRSNT: CPT | Performed by: INTERNAL MEDICINE

## 2025-07-30 PROCEDURE — 3075F SYST BP GE 130 - 139MM HG: CPT | Performed by: INTERNAL MEDICINE

## 2025-07-30 PROCEDURE — G2211 COMPLEX E/M VISIT ADD ON: HCPCS | Performed by: INTERNAL MEDICINE

## 2025-07-30 PROCEDURE — 3048F LDL-C <100 MG/DL: CPT | Performed by: INTERNAL MEDICINE

## 2025-07-30 PROCEDURE — 3079F DIAST BP 80-89 MM HG: CPT | Performed by: INTERNAL MEDICINE

## 2025-07-30 PROCEDURE — 83036 HEMOGLOBIN GLYCOSYLATED A1C: CPT | Performed by: INTERNAL MEDICINE

## 2025-07-30 PROCEDURE — 99214 OFFICE O/P EST MOD 30 MIN: CPT | Performed by: INTERNAL MEDICINE

## 2025-07-30 PROCEDURE — 3044F HG A1C LEVEL LT 7.0%: CPT | Performed by: INTERNAL MEDICINE

## 2025-07-30 PROCEDURE — 80061 LIPID PANEL: CPT | Performed by: INTERNAL MEDICINE

## 2025-07-30 PROCEDURE — 36415 COLL VENOUS BLD VENIPUNCTURE: CPT | Performed by: INTERNAL MEDICINE

## 2025-07-30 PROCEDURE — 80053 COMPREHEN METABOLIC PANEL: CPT | Performed by: INTERNAL MEDICINE

## 2025-07-30 RX ORDER — TRIAMTERENE AND HYDROCHLOROTHIAZIDE 37.5; 25 MG/1; MG/1
1 CAPSULE ORAL EVERY MORNING
Qty: 90 CAPSULE | Refills: 3 | Status: SHIPPED | OUTPATIENT
Start: 2025-07-30

## 2025-07-30 RX ORDER — FAMOTIDINE 40 MG/1
40 TABLET, FILM COATED ORAL DAILY
Qty: 90 TABLET | Refills: 3 | Status: SHIPPED | OUTPATIENT
Start: 2025-07-30

## 2025-07-30 RX ORDER — AZELASTINE 1 MG/ML
1 SPRAY, METERED NASAL 2 TIMES DAILY
Qty: 30 ML | Refills: 3 | Status: SHIPPED | OUTPATIENT
Start: 2025-07-30

## 2025-07-30 RX ORDER — AMLODIPINE BESYLATE 10 MG/1
10 TABLET ORAL DAILY
Qty: 90 TABLET | Refills: 3 | Status: SHIPPED | OUTPATIENT
Start: 2025-07-30

## 2025-07-30 RX ORDER — CETIRIZINE HYDROCHLORIDE 10 MG/1
10 TABLET ORAL DAILY
Qty: 30 TABLET | Refills: 3 | Status: SHIPPED | OUTPATIENT
Start: 2025-07-30

## 2025-07-30 ASSESSMENT — PAIN SCALES - GENERAL: PAINLEVEL_OUTOF10: NO PAIN (0)

## 2025-07-30 NOTE — PROGRESS NOTES
Assessment & Plan   Problem List Items Addressed This Visit       Hypertension goal BP (blood pressure) < 140/90 - Primary    Relevant Medications    triamterene-HCTZ (DYAZIDE) 37.5-25 MG capsule    amLODIPine (NORVASC) 10 MG tablet    Other Relevant Orders    Comprehensive metabolic panel (BMP + Alb, Alk Phos, ALT, AST, Total. Bili, TP)    Hyperlipidemia LDL goal <130    Relevant Orders    Lipid panel reflex to direct LDL Non-fasting    GERD (gastroesophageal reflux disease)    Relevant Medications    famotidine (PEPCID) 40 MG tablet    NI (obstructive sleep apnea)    Relevant Orders    Adult Sleep Eval & Management  Referral    Meningioma (H)    RESOLVED: Morbid obesity (H)     Other Visit Diagnoses         Elevated glucose        Relevant Orders    Hemoglobin A1c      Seasonal allergic rhinitis due to pollen        Relevant Medications    cetirizine (ZYRTEC) 10 MG tablet    azelastine (ASTELIN) 0.1 % nasal spray           Patient has not come in that often has not seen me before.  He lives in town does not smoke drink some alcohol he is engaged.    He does have obstructive sleep apnea has CPAP but says it does not work well as his sinuses are congested.  Will try to treat his sinus congestion with Astelin nasal spray, I recommended Flonase but he does not feel it works.  Also recommended Claritin.  We will refer him to the sleep clinic for possible inspire or other treatments for insomnia.  .  He also talks about reflux disease for years and has been on Prilosec but feels this gives him a neuropathy.  We will I  Stopped his omeprazole, recommended alcohol cessation and weight loss to help his reflux will give him prescription strength Pepcid to hopefully treat his reflux.  In the future may provide referral for surgical consultation on admission.    Elevated glucose we will check his hemoglobin A1c.    History of meningioma he says he has had this checked in the past and has no symptoms  "currently.    Main issue original issue today was his blood pressure which continues to run high up in the 168/88 better on recheck he does have amlodipine did not want to take hydrochlorothiazide but we did discuss Dyazide the combination and he will try that instead after we check his electrolytes and kidney function.      The longitudinal plan of care for the diagnosis(es)/condition(s) as documented were addressed during this visit. Due to the added complexity in care, I will continue to support Jeevan in the subsequent management and with ongoing continuity of care.            BMI  Estimated body mass index is 38.79 kg/m  as calculated from the following:    Height as of this encounter: 1.753 m (5' 9\").    Weight as of this encounter: 119.2 kg (262 lb 11.2 oz).   Weight management plan: Discussed healthy diet and exercise guidelines    Follow-up   No follow-ups on file.        Subjective   Jeevan is a 64 year old, presenting for the following health issues:  Recheck Medication      7/30/2025    12:45 PM   Additional Questions   Roomed by Ton     History of Present Illness       Hypertension: He presents for follow up of hypertension.  He does not check blood pressure  regularly outside of the clinic. Outside blood pressures have been over 140/90. He does not follow a low salt diet.     He eats 0-1 servings of fruits and vegetables daily.He consumes 1 sweetened beverage(s) daily.He exercises with enough effort to increase his heart rate 20 to 29 minutes per day.  He exercises with enough effort to increase his heart rate 5 days per week. He is missing 1 dose(s) of medications per week.  He is not taking prescribed medications regularly due to other.        Lives outside of [facility], engaged.      HTN running high,     Meningioma, stable on MRI,     NI and has cpap, affects his sleep.using lower pressure.     Using prilosec for years.  Feels it affected him for neuropathy, quit and sensation improved.  Used baking soda " "for his reflux but now back on it.           Objective    BP (!) 165/88 (BP Location: Left arm, Patient Position: Sitting, Cuff Size: Adult Regular)   Pulse 80   Temp 96.9  F (36.1  C) (Temporal)   Resp 16   Ht 1.753 m (5' 9\")   Wt 119.2 kg (262 lb 11.2 oz)   SpO2 98%   BMI 38.79 kg/m    Body mass index is 38.79 kg/m .  Physical Exam      No acute distress  Heart is regular  Neck is without carotid bruits  Lungs are clear  He does have a little bit sinus congestion and congestion in his throa  Extremities have 1+ pitting edema  t    Signed Electronically by: Raad Justice MD    "

## 2025-07-31 ENCOUNTER — PATIENT OUTREACH (OUTPATIENT)
Dept: CARE COORDINATION | Facility: CLINIC | Age: 65
End: 2025-07-31
Payer: COMMERCIAL

## 2025-08-04 ENCOUNTER — PATIENT OUTREACH (OUTPATIENT)
Dept: CARE COORDINATION | Facility: CLINIC | Age: 65
End: 2025-08-04
Payer: COMMERCIAL

## 2025-09-02 ENCOUNTER — E-VISIT (OUTPATIENT)
Dept: URGENT CARE | Facility: CLINIC | Age: 65
End: 2025-09-02
Payer: COMMERCIAL

## 2025-09-02 DIAGNOSIS — R05.1 ACUTE COUGH: Primary | ICD-10-CM

## 2025-09-02 DIAGNOSIS — R06.2 WHEEZING: ICD-10-CM

## 2025-09-02 PROCEDURE — 99207 PR NON-BILLABLE SERV PER CHARTING: CPT | Performed by: PHYSICIAN ASSISTANT

## (undated) DEVICE — SOL NACL 0.9% IRRIG 3000ML BAG 2B7477

## (undated) DEVICE — BASIN SET MAJOR

## (undated) DEVICE — SOL NACL 0.9% IRRIG 1000ML BOTTLE 2F7124

## (undated) DEVICE — SU VICRYL 2-0 CT-1 27" UND J259H

## (undated) DEVICE — STRAP KNEE/BODY 31143004

## (undated) DEVICE — SU DERMABOND ADVANCED .7ML DNX12

## (undated) DEVICE — SU MONOCRYL 3-0 PS-1 27" Y936H

## (undated) DEVICE — DAVINCI HOT SHEARS TIP COVER  400180

## (undated) DEVICE — KIT PATIENT POSITIONING PIGAZZI LATEX FREE 40580

## (undated) DEVICE — LINEN TOWEL PACK X5 5464

## (undated) DEVICE — SPONGE LAP 18X18" X8435

## (undated) DEVICE — BONE CLEANING TIP INTERPULSE  0210-010-000

## (undated) DEVICE — GOWN IMPERVIOUS SPECIALTY XLG/XLONG 32474

## (undated) DEVICE — TUBING SUCTION 12"X1/4" N612

## (undated) DEVICE — KIT ENDO TURNOVER/PROCEDURE CARRY-ON 101822

## (undated) DEVICE — DRSG TEGADERM 4X10" 1627

## (undated) DEVICE — PACK GENERAL LAPAOSCOPY

## (undated) DEVICE — GLOVE EXAM NITRILE LG

## (undated) DEVICE — BLADE KNIFE SURG 20 371120

## (undated) DEVICE — GLOVE ESTEEM BLUE W/NEU-THERA 8.0  2D73PB80

## (undated) DEVICE — SYSTEM LAPAROVUE VISIBILITY LAPVUE10

## (undated) DEVICE — DRSG TEGADERM 4X4 3/4" 1626W

## (undated) DEVICE — ADH SKIN CLOSURE PREMIERPRO EXOFIN 1.0ML 3470

## (undated) DEVICE — DAVINCI XI DRAPE ARM 470015

## (undated) DEVICE — SU STRATAFIX PDS PLUS 0 CT-2 9" SXPP1A446

## (undated) DEVICE — SUCTION IRR SYSTEM W/O TIP INTERPULSE HANDPIECE 0210-100-000

## (undated) DEVICE — SU VICRYL 0 CT-1 36" J946H

## (undated) DEVICE — BONE CEMENT MIXEVAC HI VAC W/CARTRIDGE 0306-563-000

## (undated) DEVICE — BLADE SAW SAGITTAL STRK 18X90X1.27MM HD SYS 6 6118-127-090

## (undated) DEVICE — DRSG TEGADERM ALGINATE AG 4X5" 90303

## (undated) DEVICE — GLOVE BIOGEL PI SZ 7.5 40875

## (undated) DEVICE — GLOVE BIOGEL PI ULTRATOUCH G SZ 7.5 42175

## (undated) DEVICE — DAVINCI XI OBTURATOR BLADELESS 8MM 470359

## (undated) DEVICE — SOL WATER IRRIG 1000ML BOTTLE 2F7114

## (undated) DEVICE — ESU PENCIL W/SMOKE EVAC NEPTUNE STRYKER 0703-046-000

## (undated) DEVICE — SYR 50ML SLIP TIP W/O NDL 309654

## (undated) DEVICE — DAVINCI XI SEAL UNIVERSAL 5-8MM 470361

## (undated) DEVICE — SUCTION MANIFOLD NEPTUNE 2 SYS 4 PORT 0702-020-000

## (undated) DEVICE — SU MONOCRYL 4-0 PS-2 18" UND Y496G

## (undated) DEVICE — PREP CHLORAPREP 26ML TINTED HI-LITE ORANGE 930815

## (undated) DEVICE — DEVICE SUTURE GRASPER TROCAR CLOSURE 14GA PMITCSG

## (undated) DEVICE — SOL WATER IRRIG 1000ML BOTTLE 07139-09

## (undated) DEVICE — DAVINCI XI DRAPE COLUMN 470341

## (undated) DEVICE — LUBRICATING JELLY 4.25OZ

## (undated) DEVICE — ESU GROUND PAD ADULT W/CORD E7507

## (undated) DEVICE — HOOD SURG T7PLUS PEEL AWAY FACE SHIELD STRL LF 0416-801-100

## (undated) DEVICE — NDL INSUFFLATION 120MM VERRES 172015

## (undated) DEVICE — DRSG GAUZE 4X4" 2187

## (undated) DEVICE — Device

## (undated) DEVICE — GOWN XLG DISP 9545

## (undated) DEVICE — TOURNIQUET CUFF 30" REPRO BLUE 60-7070-105

## (undated) DEVICE — DRSG STERI STRIP 1/2X4" R1547

## (undated) DEVICE — LINEN BACK PACK 5440

## (undated) DEVICE — GLOVE BIOGEL PI MICRO INDICATOR UNDERGLOVE SZ 8.0 48980

## (undated) RX ORDER — FENTANYL CITRATE 50 UG/ML
INJECTION, SOLUTION INTRAMUSCULAR; INTRAVENOUS
Status: DISPENSED
Start: 2023-03-10

## (undated) RX ORDER — CEFAZOLIN SODIUM/WATER 2 G/20 ML
SYRINGE (ML) INTRAVENOUS
Status: DISPENSED
Start: 2023-10-02

## (undated) RX ORDER — BUPIVACAINE HYDROCHLORIDE AND EPINEPHRINE 2.5; 5 MG/ML; UG/ML
INJECTION, SOLUTION EPIDURAL; INFILTRATION; INTRACAUDAL; PERINEURAL
Status: DISPENSED
Start: 2023-03-10

## (undated) RX ORDER — KETOROLAC TROMETHAMINE 30 MG/ML
INJECTION, SOLUTION INTRAMUSCULAR; INTRAVENOUS
Status: DISPENSED
Start: 2023-03-10

## (undated) RX ORDER — PROPOFOL 10 MG/ML
INJECTION, EMULSION INTRAVENOUS
Status: DISPENSED
Start: 2023-10-02

## (undated) RX ORDER — ACETAMINOPHEN 325 MG/1
TABLET ORAL
Status: DISPENSED
Start: 2023-10-02

## (undated) RX ORDER — HYDROMORPHONE HYDROCHLORIDE 1 MG/ML
INJECTION, SOLUTION INTRAMUSCULAR; INTRAVENOUS; SUBCUTANEOUS
Status: DISPENSED
Start: 2023-10-02

## (undated) RX ORDER — ONDANSETRON 2 MG/ML
INJECTION INTRAMUSCULAR; INTRAVENOUS
Status: DISPENSED
Start: 2023-10-02

## (undated) RX ORDER — PROPOFOL 10 MG/ML
INJECTION, EMULSION INTRAVENOUS
Status: DISPENSED
Start: 2023-03-10

## (undated) RX ORDER — SODIUM CHLORIDE, SODIUM LACTATE, POTASSIUM CHLORIDE, CALCIUM CHLORIDE 600; 310; 30; 20 MG/100ML; MG/100ML; MG/100ML; MG/100ML
INJECTION, SOLUTION INTRAVENOUS
Status: DISPENSED
Start: 2023-10-02

## (undated) RX ORDER — TRANEXAMIC ACID 650 MG/1
TABLET ORAL
Status: DISPENSED
Start: 2023-10-02

## (undated) RX ORDER — FENTANYL CITRATE 50 UG/ML
INJECTION, SOLUTION INTRAMUSCULAR; INTRAVENOUS
Status: DISPENSED
Start: 2023-10-02

## (undated) RX ORDER — KETOROLAC TROMETHAMINE 30 MG/ML
INJECTION, SOLUTION INTRAMUSCULAR; INTRAVENOUS
Status: DISPENSED
Start: 2023-10-02